# Patient Record
Sex: FEMALE | Race: BLACK OR AFRICAN AMERICAN | Employment: OTHER | URBAN - METROPOLITAN AREA
[De-identification: names, ages, dates, MRNs, and addresses within clinical notes are randomized per-mention and may not be internally consistent; named-entity substitution may affect disease eponyms.]

---

## 2017-01-01 ENCOUNTER — HOSPITAL ENCOUNTER (OUTPATIENT)
Dept: LAB | Age: 82
Discharge: HOME | End: 2017-08-22
Payer: MEDICARE

## 2017-01-01 ENCOUNTER — CLINICAL SUPPORT (OUTPATIENT)
Dept: CARDIOLOGY CLINIC | Age: 82
End: 2017-01-01

## 2017-01-01 ENCOUNTER — HOSPITAL ENCOUNTER (OUTPATIENT)
Dept: LAB | Age: 82
Discharge: HOME | End: 2017-07-31
Payer: MEDICARE

## 2017-01-01 ENCOUNTER — HOSPITAL ENCOUNTER (OUTPATIENT)
Dept: LAB | Age: 82
Discharge: HOME | End: 2017-11-09
Payer: MEDICARE

## 2017-01-01 ENCOUNTER — HOSPITAL ENCOUNTER (INPATIENT)
Age: 82
LOS: 1 days | Discharge: HOME | DRG: 189 | End: 2017-07-23
Attending: EMERGENCY MEDICINE | Admitting: FAMILY MEDICINE
Payer: MEDICARE

## 2017-01-01 ENCOUNTER — APPOINTMENT (OUTPATIENT)
Dept: GENERAL RADIOLOGY | Age: 82
DRG: 189 | End: 2017-01-01
Attending: EMERGENCY MEDICINE
Payer: MEDICARE

## 2017-01-01 ENCOUNTER — TELEPHONE (OUTPATIENT)
Dept: CARDIOLOGY CLINIC | Age: 82
End: 2017-01-01

## 2017-01-01 ENCOUNTER — OFFICE VISIT (OUTPATIENT)
Dept: CARDIOLOGY CLINIC | Age: 82
End: 2017-01-01

## 2017-01-01 VITALS
BODY MASS INDEX: 21.44 KG/M2 | WEIGHT: 121 LBS | OXYGEN SATURATION: 98 % | DIASTOLIC BLOOD PRESSURE: 60 MMHG | HEART RATE: 62 BPM | HEIGHT: 63 IN | SYSTOLIC BLOOD PRESSURE: 140 MMHG

## 2017-01-01 VITALS
BODY MASS INDEX: 21.43 KG/M2 | HEART RATE: 65 BPM | DIASTOLIC BLOOD PRESSURE: 58 MMHG | OXYGEN SATURATION: 98 % | WEIGHT: 121 LBS | SYSTOLIC BLOOD PRESSURE: 128 MMHG

## 2017-01-01 VITALS
HEART RATE: 64 BPM | DIASTOLIC BLOOD PRESSURE: 50 MMHG | WEIGHT: 123 LBS | OXYGEN SATURATION: 99 % | SYSTOLIC BLOOD PRESSURE: 130 MMHG | BODY MASS INDEX: 21.79 KG/M2

## 2017-01-01 VITALS
DIASTOLIC BLOOD PRESSURE: 51 MMHG | SYSTOLIC BLOOD PRESSURE: 125 MMHG | HEIGHT: 63 IN | TEMPERATURE: 98.1 F | BODY MASS INDEX: 22.34 KG/M2 | RESPIRATION RATE: 17 BRPM | WEIGHT: 126.1 LBS | HEART RATE: 58 BPM | OXYGEN SATURATION: 100 %

## 2017-01-01 VITALS
HEIGHT: 63 IN | OXYGEN SATURATION: 99 % | WEIGHT: 123 LBS | SYSTOLIC BLOOD PRESSURE: 142 MMHG | HEART RATE: 61 BPM | BODY MASS INDEX: 21.79 KG/M2 | DIASTOLIC BLOOD PRESSURE: 60 MMHG

## 2017-01-01 DIAGNOSIS — N18.30 CHRONIC KIDNEY DISEASE, STAGE III (MODERATE) (HCC): ICD-10-CM

## 2017-01-01 DIAGNOSIS — I10 ESSENTIAL HYPERTENSION: Primary | ICD-10-CM

## 2017-01-01 DIAGNOSIS — I50.23 SYSTOLIC CHF, ACUTE ON CHRONIC (HCC): Primary | ICD-10-CM

## 2017-01-01 DIAGNOSIS — I27.20 PULMONARY HYPERTENSION (HCC): ICD-10-CM

## 2017-01-01 DIAGNOSIS — I10 ESSENTIAL HYPERTENSION: ICD-10-CM

## 2017-01-01 DIAGNOSIS — D50.9 IRON DEFICIENCY ANEMIA: ICD-10-CM

## 2017-01-01 DIAGNOSIS — I20.8 ANGINA OF EFFORT (HCC): Primary | ICD-10-CM

## 2017-01-01 DIAGNOSIS — Z98.890 S/P MITRAL VALVE REPAIR: ICD-10-CM

## 2017-01-01 DIAGNOSIS — R09.02 HYPOXIA: ICD-10-CM

## 2017-01-01 DIAGNOSIS — N18.9 ANEMIA OF CHRONIC RENAL FAILURE: ICD-10-CM

## 2017-01-01 DIAGNOSIS — E78.5 HYPERLIPEMIA: ICD-10-CM

## 2017-01-01 DIAGNOSIS — D50.9 IRON (FE) DEFICIENCY ANEMIA: ICD-10-CM

## 2017-01-01 DIAGNOSIS — I20.8 ANGINA OF EFFORT (HCC): ICD-10-CM

## 2017-01-01 DIAGNOSIS — Z95.810 BIVENTRICULAR IMPLANTABLE CARDIOVERTER-DEFIBRILLATOR IN SITU: ICD-10-CM

## 2017-01-01 DIAGNOSIS — E87.3 METABOLIC ALKALOSIS: ICD-10-CM

## 2017-01-01 DIAGNOSIS — I50.42 CHRONIC COMBINED SYSTOLIC AND DIASTOLIC CONGESTIVE HEART FAILURE (HCC): ICD-10-CM

## 2017-01-01 DIAGNOSIS — D63.1 ANEMIA OF CHRONIC RENAL FAILURE: ICD-10-CM

## 2017-01-01 DIAGNOSIS — I50.23 SYSTOLIC CHF, ACUTE ON CHRONIC (HCC): ICD-10-CM

## 2017-01-01 DIAGNOSIS — I12.9 BENIGN HYPERTENSIVE KIDNEY DISEASE WITH CHRONIC KIDNEY DISEASE STAGE I THROUGH STAGE IV, OR UNSPECIFIED(403.10): ICD-10-CM

## 2017-01-01 DIAGNOSIS — I25.5 ISCHEMIC CARDIOMYOPATHY: ICD-10-CM

## 2017-01-01 DIAGNOSIS — E21.1 HYPERPARATHYROIDISM DUE TO 1,25(0H)2D3 (HCC): ICD-10-CM

## 2017-01-01 DIAGNOSIS — I12.9 MALIGNANT HYPERTENSIVE KIDNEY DISEASE WITH CHRONIC KIDNEY DISEASE STAGE I THROUGH STAGE IV, OR UNSPECIFIED(403.00): ICD-10-CM

## 2017-01-01 DIAGNOSIS — R07.9 CHEST PAIN, UNSPECIFIED TYPE: Primary | ICD-10-CM

## 2017-01-01 LAB
ALBUMIN SERPL BCP-MCNC: 3.3 G/DL (ref 3.4–5)
ALBUMIN SERPL-MCNC: 3.3 G/DL (ref 3.4–5)
ALBUMIN/GLOB SERPL: 1.1 {RATIO} (ref 0.8–1.7)
ALP SERPL-CCNC: 24 U/L (ref 45–117)
ALT SERPL-CCNC: 29 U/L (ref 13–56)
ANION GAP BLD CALC-SCNC: 6 MMOL/L (ref 3–18)
ANION GAP BLD CALC-SCNC: 6 MMOL/L (ref 3–18)
ANION GAP BLD CALC-SCNC: 7 MMOL/L (ref 3–18)
ANION GAP SERPL CALC-SCNC: 6 MMOL/L (ref 3–18)
ANION GAP SERPL CALC-SCNC: 7 MMOL/L (ref 3–18)
APPEARANCE UR: CLEAR
AST SERPL W P-5'-P-CCNC: 26 U/L (ref 15–37)
ATRIAL RATE: 75 BPM
BACTERIA URNS QL MICRO: ABNORMAL /HPF
BASOPHILS # BLD AUTO: 0 K/UL (ref 0–0.06)
BASOPHILS # BLD: 1 % (ref 0–2)
BILIRUB SERPL-MCNC: 0.6 MG/DL (ref 0.2–1)
BILIRUB UR QL: NEGATIVE
BUN SERPL-MCNC: 27 MG/DL (ref 7–18)
BUN SERPL-MCNC: 29 MG/DL (ref 7–18)
BUN SERPL-MCNC: 30 MG/DL (ref 7–18)
BUN SERPL-MCNC: 31 MG/DL (ref 7–18)
BUN SERPL-MCNC: 40 MG/DL (ref 7–18)
BUN/CREAT SERPL: 17 (ref 12–20)
BUN/CREAT SERPL: 18 (ref 12–20)
BUN/CREAT SERPL: 18 (ref 12–20)
BUN/CREAT SERPL: 20 (ref 12–20)
BUN/CREAT SERPL: 22 (ref 12–20)
CALCIUM SERPL-MCNC: 10.1 MG/DL (ref 8.5–10.1)
CALCIUM SERPL-MCNC: 9.1 MG/DL (ref 8.5–10.1)
CALCIUM SERPL-MCNC: 9.4 MG/DL (ref 8.5–10.1)
CALCIUM SERPL-MCNC: 9.5 MG/DL (ref 8.5–10.1)
CALCIUM SERPL-MCNC: 9.6 MG/DL (ref 8.5–10.1)
CALCIUM SERPL-MCNC: 9.6 MG/DL (ref 8.5–10.1)
CALCULATED P AXIS, ECG09: 85 DEGREES
CALCULATED R AXIS, ECG10: 114 DEGREES
CALCULATED T AXIS, ECG11: 127 DEGREES
CHLORIDE SERPL-SCNC: 100 MMOL/L (ref 100–108)
CHLORIDE SERPL-SCNC: 100 MMOL/L (ref 100–108)
CHLORIDE SERPL-SCNC: 101 MMOL/L (ref 100–108)
CHLORIDE SERPL-SCNC: 104 MMOL/L (ref 100–108)
CHLORIDE SERPL-SCNC: 105 MMOL/L (ref 100–108)
CK MB CFR SERPL CALC: 1.7 % (ref 0–4)
CK MB CFR SERPL CALC: 2.3 % (ref 0–4)
CK MB SERPL-MCNC: 1.4 NG/ML (ref 5–25)
CK MB SERPL-MCNC: 1.8 NG/ML (ref 5–25)
CK SERPL-CCNC: 80 U/L (ref 26–192)
CK SERPL-CCNC: 81 U/L (ref 26–192)
CO2 SERPL-SCNC: 29 MMOL/L (ref 21–32)
CO2 SERPL-SCNC: 31 MMOL/L (ref 21–32)
CO2 SERPL-SCNC: 33 MMOL/L (ref 21–32)
CO2 SERPL-SCNC: 34 MMOL/L (ref 21–32)
CO2 SERPL-SCNC: 36 MMOL/L (ref 21–32)
COLOR UR: YELLOW
CREAT SERPL-MCNC: 1.47 MG/DL (ref 0.6–1.3)
CREAT SERPL-MCNC: 1.49 MG/DL (ref 0.6–1.3)
CREAT SERPL-MCNC: 1.72 MG/DL (ref 0.6–1.3)
CREAT SERPL-MCNC: 1.74 MG/DL (ref 0.6–1.3)
CREAT SERPL-MCNC: 1.85 MG/DL (ref 0.6–1.3)
CREAT UR-MCNC: 146 MG/DL (ref 30–125)
DIAGNOSIS, 93000: NORMAL
DIFFERENTIAL METHOD BLD: ABNORMAL
EOSINOPHIL # BLD: 0.1 K/UL (ref 0–0.4)
EOSINOPHIL NFR BLD: 2 % (ref 0–5)
EPITH CASTS URNS QL MICRO: ABNORMAL /LPF (ref 0–5)
ERYTHROCYTE [DISTWIDTH] IN BLOOD BY AUTOMATED COUNT: 13.1 % (ref 11.6–14.5)
ERYTHROCYTE [DISTWIDTH] IN BLOOD BY AUTOMATED COUNT: 13.2 % (ref 11.6–14.5)
GLOBULIN SER CALC-MCNC: 3.1 G/DL (ref 2–4)
GLUCOSE SERPL-MCNC: 113 MG/DL (ref 74–99)
GLUCOSE SERPL-MCNC: 146 MG/DL (ref 74–99)
GLUCOSE SERPL-MCNC: 158 MG/DL (ref 74–99)
GLUCOSE SERPL-MCNC: 91 MG/DL (ref 74–99)
GLUCOSE SERPL-MCNC: 98 MG/DL (ref 74–99)
GLUCOSE UR STRIP.AUTO-MCNC: NEGATIVE MG/DL
HCT VFR BLD AUTO: 27.9 % (ref 35–45)
HCT VFR BLD AUTO: 29.3 % (ref 35–45)
HCT VFR BLD AUTO: 31.1 % (ref 35–45)
HGB BLD-MCNC: 8.9 G/DL (ref 12–16)
HGB BLD-MCNC: 9.6 G/DL (ref 12–16)
HGB BLD-MCNC: 9.9 G/DL (ref 12–16)
HGB UR QL STRIP: NEGATIVE
KETONES UR QL STRIP.AUTO: NEGATIVE MG/DL
LEUKOCYTE ESTERASE UR QL STRIP.AUTO: NEGATIVE
LYMPHOCYTES # BLD AUTO: 24 % (ref 21–52)
LYMPHOCYTES # BLD: 1 K/UL (ref 0.9–3.6)
MCH RBC QN AUTO: 30.7 PG (ref 24–34)
MCH RBC QN AUTO: 30.7 PG (ref 24–34)
MCHC RBC AUTO-ENTMCNC: 31.8 G/DL (ref 31–37)
MCHC RBC AUTO-ENTMCNC: 32.8 G/DL (ref 31–37)
MCV RBC AUTO: 93.6 FL (ref 74–97)
MCV RBC AUTO: 96.3 FL (ref 74–97)
MICROALBUMIN UR-MCNC: 17.2 MG/DL (ref 0–3)
MICROALBUMIN/CREAT UR-RTO: 118 MG/G (ref 0–30)
MONOCYTES # BLD: 0.6 K/UL (ref 0.05–1.2)
MONOCYTES NFR BLD AUTO: 13 % (ref 3–10)
NEUTS SEG # BLD: 2.5 K/UL (ref 1.8–8)
NEUTS SEG NFR BLD AUTO: 60 % (ref 40–73)
NITRITE UR QL STRIP.AUTO: NEGATIVE
P-R INTERVAL, ECG05: 180 MS
PH UR STRIP: 8.5 [PH] (ref 5–8)
PHOSPHATE SERPL-MCNC: 3.8 MG/DL (ref 2.5–4.9)
PLATELET # BLD AUTO: 118 K/UL (ref 135–420)
PLATELET # BLD AUTO: 144 K/UL (ref 135–420)
PMV BLD AUTO: 11.1 FL (ref 9.2–11.8)
PMV BLD AUTO: 9.9 FL (ref 9.2–11.8)
POTASSIUM SERPL-SCNC: 3.7 MMOL/L (ref 3.5–5.5)
POTASSIUM SERPL-SCNC: 3.7 MMOL/L (ref 3.5–5.5)
POTASSIUM SERPL-SCNC: 4.1 MMOL/L (ref 3.5–5.5)
POTASSIUM SERPL-SCNC: 4.7 MMOL/L (ref 3.5–5.5)
POTASSIUM SERPL-SCNC: 4.7 MMOL/L (ref 3.5–5.5)
PROT SERPL-MCNC: 6.4 G/DL (ref 6.4–8.2)
PROT UR STRIP-MCNC: 30 MG/DL
PTH-INTACT SERPL-MCNC: 23.4 PG/ML (ref 14–72)
Q-T INTERVAL, ECG07: 424 MS
QRS DURATION, ECG06: 118 MS
QTC CALCULATION (BEZET), ECG08: 473 MS
RBC # BLD AUTO: 3.13 M/UL (ref 4.2–5.3)
RBC # BLD AUTO: 3.23 M/UL (ref 4.2–5.3)
RBC #/AREA URNS HPF: ABNORMAL /HPF (ref 0–5)
SODIUM SERPL-SCNC: 140 MMOL/L (ref 136–145)
SODIUM SERPL-SCNC: 141 MMOL/L (ref 136–145)
SODIUM SERPL-SCNC: 142 MMOL/L (ref 136–145)
SP GR UR REFRACTOMETRY: 1.02 (ref 1–1.03)
TROPONIN I SERPL-MCNC: 0.03 NG/ML (ref 0–0.04)
TROPONIN I SERPL-MCNC: 0.03 NG/ML (ref 0–0.04)
UROBILINOGEN UR QL STRIP.AUTO: 0.2 EU/DL (ref 0.2–1)
VENTRICULAR RATE, ECG03: 75 BPM
WBC # BLD AUTO: 4.1 K/UL (ref 4.6–13.2)
WBC # BLD AUTO: 4.1 K/UL (ref 4.6–13.2)
WBC URNS QL MICRO: ABNORMAL /HPF (ref 0–4)

## 2017-01-01 PROCEDURE — 36415 COLL VENOUS BLD VENIPUNCTURE: CPT | Performed by: INTERNAL MEDICINE

## 2017-01-01 PROCEDURE — 83970 ASSAY OF PARATHORMONE: CPT | Performed by: INTERNAL MEDICINE

## 2017-01-01 PROCEDURE — 96374 THER/PROPH/DIAG INJ IV PUSH: CPT

## 2017-01-01 PROCEDURE — 85025 COMPLETE CBC W/AUTO DIFF WBC: CPT | Performed by: EMERGENCY MEDICINE

## 2017-01-01 PROCEDURE — 82550 ASSAY OF CK (CPK): CPT | Performed by: FAMILY MEDICINE

## 2017-01-01 PROCEDURE — 65660000000 HC RM CCU STEPDOWN

## 2017-01-01 PROCEDURE — 81001 URINALYSIS AUTO W/SCOPE: CPT | Performed by: EMERGENCY MEDICINE

## 2017-01-01 PROCEDURE — 85018 HEMOGLOBIN: CPT | Performed by: INTERNAL MEDICINE

## 2017-01-01 PROCEDURE — 80048 BASIC METABOLIC PNL TOTAL CA: CPT | Performed by: INTERNAL MEDICINE

## 2017-01-01 PROCEDURE — 74011250637 HC RX REV CODE- 250/637: Performed by: STUDENT IN AN ORGANIZED HEALTH CARE EDUCATION/TRAINING PROGRAM

## 2017-01-01 PROCEDURE — 77010033678 HC OXYGEN DAILY

## 2017-01-01 PROCEDURE — 82043 UR ALBUMIN QUANTITATIVE: CPT | Performed by: INTERNAL MEDICINE

## 2017-01-01 PROCEDURE — 96375 TX/PRO/DX INJ NEW DRUG ADDON: CPT

## 2017-01-01 PROCEDURE — 74011250636 HC RX REV CODE- 250/636: Performed by: EMERGENCY MEDICINE

## 2017-01-01 PROCEDURE — 85027 COMPLETE CBC AUTOMATED: CPT

## 2017-01-01 PROCEDURE — 74011250637 HC RX REV CODE- 250/637: Performed by: FAMILY MEDICINE

## 2017-01-01 PROCEDURE — 77030011943

## 2017-01-01 PROCEDURE — 80048 BASIC METABOLIC PNL TOTAL CA: CPT

## 2017-01-01 PROCEDURE — 93306 TTE W/DOPPLER COMPLETE: CPT

## 2017-01-01 PROCEDURE — 36415 COLL VENOUS BLD VENIPUNCTURE: CPT

## 2017-01-01 PROCEDURE — 82550 ASSAY OF CK (CPK): CPT | Performed by: EMERGENCY MEDICINE

## 2017-01-01 PROCEDURE — 74011250637 HC RX REV CODE- 250/637: Performed by: INTERNAL MEDICINE

## 2017-01-01 PROCEDURE — 93005 ELECTROCARDIOGRAM TRACING: CPT

## 2017-01-01 PROCEDURE — 80053 COMPREHEN METABOLIC PANEL: CPT | Performed by: EMERGENCY MEDICINE

## 2017-01-01 PROCEDURE — 74011250636 HC RX REV CODE- 250/636: Performed by: STUDENT IN AN ORGANIZED HEALTH CARE EDUCATION/TRAINING PROGRAM

## 2017-01-01 PROCEDURE — 99285 EMERGENCY DEPT VISIT HI MDM: CPT

## 2017-01-01 PROCEDURE — 71010 XR CHEST PORT: CPT

## 2017-01-01 PROCEDURE — 93970 EXTREMITY STUDY: CPT

## 2017-01-01 PROCEDURE — 80069 RENAL FUNCTION PANEL: CPT | Performed by: INTERNAL MEDICINE

## 2017-01-01 RX ORDER — LANOLIN ALCOHOL/MO/W.PET/CERES
325 CREAM (GRAM) TOPICAL
Status: DISCONTINUED | OUTPATIENT
Start: 2017-01-01 | End: 2017-01-01 | Stop reason: HOSPADM

## 2017-01-01 RX ORDER — LEVOTHYROXINE SODIUM 50 UG/1
50 TABLET ORAL
Status: DISCONTINUED | OUTPATIENT
Start: 2017-01-01 | End: 2017-01-01 | Stop reason: HOSPADM

## 2017-01-01 RX ORDER — CALCIUM CARBONATE/VITAMIN D3 600MG-5MCG
1 TABLET ORAL DAILY
Status: DISCONTINUED | OUTPATIENT
Start: 2017-01-01 | End: 2017-01-01 | Stop reason: HOSPADM

## 2017-01-01 RX ORDER — GUAIFENESIN 100 MG/5ML
81 LIQUID (ML) ORAL DAILY
Status: DISCONTINUED | OUTPATIENT
Start: 2017-01-01 | End: 2017-01-01 | Stop reason: HOSPADM

## 2017-01-01 RX ORDER — POTASSIUM CHLORIDE 20 MEQ/1
20 TABLET, EXTENDED RELEASE ORAL 2 TIMES DAILY
Status: DISCONTINUED | OUTPATIENT
Start: 2017-01-01 | End: 2017-01-01 | Stop reason: HOSPADM

## 2017-01-01 RX ORDER — MORPHINE SULFATE 2 MG/ML
2 INJECTION, SOLUTION INTRAMUSCULAR; INTRAVENOUS
Status: COMPLETED | OUTPATIENT
Start: 2017-01-01 | End: 2017-01-01

## 2017-01-01 RX ORDER — HYDRALAZINE HYDROCHLORIDE 50 MG/1
50 TABLET, FILM COATED ORAL 3 TIMES DAILY
Status: DISCONTINUED | OUTPATIENT
Start: 2017-01-01 | End: 2017-01-01

## 2017-01-01 RX ORDER — PANTOPRAZOLE SODIUM 40 MG/1
40 TABLET, DELAYED RELEASE ORAL DAILY
Status: DISCONTINUED | OUTPATIENT
Start: 2017-01-01 | End: 2017-01-01 | Stop reason: HOSPADM

## 2017-01-01 RX ORDER — ATORVASTATIN CALCIUM 20 MG/1
20 TABLET, FILM COATED ORAL
Status: DISCONTINUED | OUTPATIENT
Start: 2017-01-01 | End: 2017-01-01 | Stop reason: HOSPADM

## 2017-01-01 RX ORDER — ISOSORBIDE MONONITRATE 120 MG/1
TABLET, EXTENDED RELEASE ORAL
Qty: 30 TAB | Refills: 11 | Status: SHIPPED | OUTPATIENT
Start: 2017-01-01

## 2017-01-01 RX ORDER — NITROGLYCERIN 0.4 MG/1
0.4 TABLET SUBLINGUAL
Status: DISCONTINUED | OUTPATIENT
Start: 2017-01-01 | End: 2017-01-01 | Stop reason: HOSPADM

## 2017-01-01 RX ORDER — FLUTICASONE PROPIONATE 50 MCG
2 SPRAY, SUSPENSION (ML) NASAL
Status: DISCONTINUED | OUTPATIENT
Start: 2017-01-01 | End: 2017-01-01 | Stop reason: HOSPADM

## 2017-01-01 RX ORDER — UREA 10 %
2 LOTION (ML) TOPICAL 2 TIMES DAILY
Status: DISCONTINUED | OUTPATIENT
Start: 2017-01-01 | End: 2017-01-01 | Stop reason: HOSPADM

## 2017-01-01 RX ORDER — ONDANSETRON 2 MG/ML
4 INJECTION INTRAMUSCULAR; INTRAVENOUS
Status: COMPLETED | OUTPATIENT
Start: 2017-01-01 | End: 2017-01-01

## 2017-01-01 RX ORDER — CARVEDILOL 25 MG/1
25 TABLET ORAL 2 TIMES DAILY WITH MEALS
Status: DISCONTINUED | OUTPATIENT
Start: 2017-01-01 | End: 2017-01-01 | Stop reason: HOSPADM

## 2017-01-01 RX ORDER — ISOSORBIDE MONONITRATE 60 MG/1
120 TABLET, EXTENDED RELEASE ORAL DAILY
Status: DISCONTINUED | OUTPATIENT
Start: 2017-01-01 | End: 2017-01-01 | Stop reason: HOSPADM

## 2017-01-01 RX ORDER — HEPARIN SODIUM 5000 [USP'U]/ML
5000 INJECTION, SOLUTION INTRAVENOUS; SUBCUTANEOUS EVERY 8 HOURS
Status: DISCONTINUED | OUTPATIENT
Start: 2017-01-01 | End: 2017-01-01 | Stop reason: HOSPADM

## 2017-01-01 RX ORDER — MORPHINE SULFATE 2 MG/ML
0.5 INJECTION, SOLUTION INTRAMUSCULAR; INTRAVENOUS ONCE
Status: COMPLETED | OUTPATIENT
Start: 2017-01-01 | End: 2017-01-01

## 2017-01-01 RX ORDER — LIDOCAINE HCL 4 G/100G
CREAM TOPICAL
Qty: 1 TUBE | Refills: 1 | Status: SHIPPED | OUTPATIENT
Start: 2017-01-01 | End: 2018-01-01 | Stop reason: CLARIF

## 2017-01-01 RX ORDER — HYDRALAZINE HYDROCHLORIDE 25 MG/1
75 TABLET, FILM COATED ORAL 3 TIMES DAILY
Qty: 270 TAB | Refills: 1 | Status: SHIPPED | OUTPATIENT
Start: 2017-01-01 | End: 2017-01-01

## 2017-01-01 RX ORDER — BUMETANIDE 1 MG/1
1 TABLET ORAL DAILY
Status: DISCONTINUED | OUTPATIENT
Start: 2017-01-01 | End: 2017-01-01 | Stop reason: HOSPADM

## 2017-01-01 RX ADMIN — HYDRALAZINE HYDROCHLORIDE 50 MG: 50 TABLET, FILM COATED ORAL at 08:55

## 2017-01-01 RX ADMIN — FERROUS SULFATE TAB 325 MG (65 MG ELEMENTAL FE) 325 MG: 325 (65 FE) TAB at 08:55

## 2017-01-01 RX ADMIN — Medication 0.5 MG: at 19:55

## 2017-01-01 RX ADMIN — PANTOPRAZOLE SODIUM 40 MG: 40 TABLET, DELAYED RELEASE ORAL at 08:54

## 2017-01-01 RX ADMIN — ONDANSETRON 4 MG: 2 INJECTION INTRAMUSCULAR; INTRAVENOUS at 11:29

## 2017-01-01 RX ADMIN — HEPARIN SODIUM 5000 UNITS: 5000 INJECTION, SOLUTION INTRAVENOUS; SUBCUTANEOUS at 03:31

## 2017-01-01 RX ADMIN — CARVEDILOL 25 MG: 25 TABLET, FILM COATED ORAL at 08:55

## 2017-01-01 RX ADMIN — HYDRALAZINE HYDROCHLORIDE 75 MG: 50 TABLET, FILM COATED ORAL at 16:21

## 2017-01-01 RX ADMIN — HEPARIN SODIUM 5000 UNITS: 5000 INJECTION, SOLUTION INTRAVENOUS; SUBCUTANEOUS at 11:40

## 2017-01-01 RX ADMIN — FLUTICASONE PROPIONATE 2 SPRAY: 50 SPRAY, METERED NASAL at 21:54

## 2017-01-01 RX ADMIN — LACTOBACILLUS TAB 2 TABLET: TAB at 16:21

## 2017-01-01 RX ADMIN — POTASSIUM CHLORIDE 20 MEQ: 1500 TABLET, EXTENDED RELEASE ORAL at 19:55

## 2017-01-01 RX ADMIN — CARVEDILOL 25 MG: 25 TABLET, FILM COATED ORAL at 16:22

## 2017-01-01 RX ADMIN — ATORVASTATIN CALCIUM 20 MG: 20 TABLET, FILM COATED ORAL at 21:54

## 2017-01-01 RX ADMIN — PHENOBARBITAL 30 ML: 16.2; .1037; .0065; .0194 ELIXIR ORAL at 11:38

## 2017-01-01 RX ADMIN — CARVEDILOL 25 MG: 25 TABLET, FILM COATED ORAL at 19:55

## 2017-01-01 RX ADMIN — Medication 2 MG: at 11:31

## 2017-01-01 RX ADMIN — LACTOBACILLUS TAB 2 TABLET: TAB at 08:54

## 2017-01-01 RX ADMIN — Medication 1 TABLET: at 08:54

## 2017-01-01 RX ADMIN — HEPARIN SODIUM 5000 UNITS: 5000 INJECTION, SOLUTION INTRAVENOUS; SUBCUTANEOUS at 19:55

## 2017-01-01 RX ADMIN — ISOSORBIDE MONONITRATE 120 MG: 60 TABLET, EXTENDED RELEASE ORAL at 08:54

## 2017-01-01 RX ADMIN — HYDRALAZINE HYDROCHLORIDE 50 MG: 50 TABLET, FILM COATED ORAL at 21:54

## 2017-01-01 RX ADMIN — LACTOBACILLUS TAB 2 TABLET: TAB at 19:55

## 2017-01-01 RX ADMIN — ASPIRIN 81 MG 81 MG: 81 TABLET ORAL at 08:55

## 2017-01-01 RX ADMIN — LEVOTHYROXINE SODIUM 50 MCG: 50 TABLET ORAL at 08:54

## 2017-01-01 RX ADMIN — BUMETANIDE 1 MG: 1 TABLET ORAL at 08:54

## 2017-01-13 ENCOUNTER — APPOINTMENT (OUTPATIENT)
Dept: GENERAL RADIOLOGY | Age: 82
DRG: 291 | End: 2017-01-13
Attending: EMERGENCY MEDICINE
Payer: MEDICARE

## 2017-01-13 ENCOUNTER — HOSPITAL ENCOUNTER (INPATIENT)
Age: 82
LOS: 4 days | Discharge: NURSING, REHAB OR LONG-TERM CARE | DRG: 291 | End: 2017-01-17
Attending: EMERGENCY MEDICINE | Admitting: FAMILY MEDICINE
Payer: MEDICARE

## 2017-01-13 ENCOUNTER — APPOINTMENT (OUTPATIENT)
Dept: CT IMAGING | Age: 82
DRG: 291 | End: 2017-01-13
Attending: EMERGENCY MEDICINE
Payer: MEDICARE

## 2017-01-13 DIAGNOSIS — E86.0 DEHYDRATION: ICD-10-CM

## 2017-01-13 DIAGNOSIS — R07.9 CHEST PAIN, UNSPECIFIED TYPE: ICD-10-CM

## 2017-01-13 DIAGNOSIS — J18.9 CAP (COMMUNITY ACQUIRED PNEUMONIA): Primary | ICD-10-CM

## 2017-01-13 DIAGNOSIS — R06.09 DYSPNEA ON EXERTION: ICD-10-CM

## 2017-01-13 PROBLEM — R06.03 RESPIRATORY DISTRESS: Status: ACTIVE | Noted: 2017-01-13

## 2017-01-13 LAB
ALBUMIN SERPL BCP-MCNC: 3.7 G/DL (ref 3.4–5)
ALBUMIN/GLOB SERPL: 1.1 {RATIO} (ref 0.8–1.7)
ALP SERPL-CCNC: 21 U/L (ref 45–117)
ALT SERPL-CCNC: 37 U/L (ref 13–56)
ANION GAP BLD CALC-SCNC: 9 MMOL/L (ref 3–18)
APPEARANCE UR: CLEAR
AST SERPL W P-5'-P-CCNC: 36 U/L (ref 15–37)
BACTERIA URNS QL MICRO: ABNORMAL /HPF
BASOPHILS # BLD AUTO: 0 K/UL (ref 0–0.1)
BASOPHILS # BLD: 1 % (ref 0–2)
BILIRUB SERPL-MCNC: 0.7 MG/DL (ref 0.2–1)
BILIRUB UR QL: NEGATIVE
BNP SERPL-MCNC: ABNORMAL PG/ML (ref 0–1800)
BUN SERPL-MCNC: 21 MG/DL (ref 7–18)
BUN/CREAT SERPL: 15 (ref 12–20)
CALCIUM SERPL-MCNC: 10 MG/DL (ref 8.5–10.1)
CHLORIDE SERPL-SCNC: 104 MMOL/L (ref 100–108)
CK MB CFR SERPL CALC: 2.3 % (ref 0–4)
CK MB SERPL-MCNC: 1.9 NG/ML (ref 0.5–3.6)
CK SERPL-CCNC: 83 U/L (ref 26–192)
CO2 SERPL-SCNC: 28 MMOL/L (ref 21–32)
COLOR UR: YELLOW
CREAT SERPL-MCNC: 1.39 MG/DL (ref 0.6–1.3)
DIFFERENTIAL METHOD BLD: ABNORMAL
EOSINOPHIL # BLD: 0.1 K/UL (ref 0–0.4)
EOSINOPHIL NFR BLD: 2 % (ref 0–5)
EPITH CASTS URNS QL MICRO: NEGATIVE /LPF (ref 0–5)
ERYTHROCYTE [DISTWIDTH] IN BLOOD BY AUTOMATED COUNT: 12.5 % (ref 11.6–14.5)
FERRITIN SERPL-MCNC: 529 NG/ML (ref 8–388)
FLUAV AG NPH QL IA: NEGATIVE
FLUBV AG NOSE QL IA: NEGATIVE
GLOBULIN SER CALC-MCNC: 3.4 G/DL (ref 2–4)
GLUCOSE SERPL-MCNC: 114 MG/DL (ref 74–99)
GLUCOSE UR STRIP.AUTO-MCNC: NEGATIVE MG/DL
HCT VFR BLD AUTO: 35.3 % (ref 35–45)
HGB BLD-MCNC: 11.6 G/DL (ref 12–16)
HGB UR QL STRIP: NEGATIVE
HYALINE CASTS URNS QL MICRO: ABNORMAL /LPF (ref 0–2)
IRON SATN MFR SERPL: 15 %
IRON SERPL-MCNC: 41 UG/DL (ref 50–175)
KETONES UR QL STRIP.AUTO: ABNORMAL MG/DL
LEUKOCYTE ESTERASE UR QL STRIP.AUTO: ABNORMAL
LYMPHOCYTES # BLD AUTO: 20 % (ref 21–52)
LYMPHOCYTES # BLD: 1 K/UL (ref 0.9–3.6)
MCH RBC QN AUTO: 30.8 PG (ref 24–34)
MCHC RBC AUTO-ENTMCNC: 32.9 G/DL (ref 31–37)
MCV RBC AUTO: 93.6 FL (ref 74–97)
MONOCYTES # BLD: 0.5 K/UL (ref 0.05–1.2)
MONOCYTES NFR BLD AUTO: 11 % (ref 3–10)
NEUTS SEG # BLD: 3.2 K/UL (ref 1.8–8)
NEUTS SEG NFR BLD AUTO: 66 % (ref 40–73)
NITRITE UR QL STRIP.AUTO: NEGATIVE
PH UR STRIP: 8 [PH] (ref 5–8)
PLATELET # BLD AUTO: 156 K/UL (ref 135–420)
PMV BLD AUTO: 9.7 FL (ref 9.2–11.8)
POTASSIUM SERPL-SCNC: 4.9 MMOL/L (ref 3.5–5.5)
PROT SERPL-MCNC: 7.1 G/DL (ref 6.4–8.2)
PROT UR STRIP-MCNC: 100 MG/DL
RBC # BLD AUTO: 3.77 M/UL (ref 4.2–5.3)
RBC #/AREA URNS HPF: NEGATIVE /HPF (ref 0–5)
SODIUM SERPL-SCNC: 141 MMOL/L (ref 136–145)
SP GR UR REFRACTOMETRY: 1.02 (ref 1–1.03)
TIBC SERPL-MCNC: 276 UG/DL (ref 250–450)
TROPONIN I SERPL-MCNC: 0.03 NG/ML (ref 0–0.04)
TROPONIN I SERPL-MCNC: 0.04 NG/ML (ref 0–0.04)
TSH SERPL DL<=0.05 MIU/L-ACNC: 2.53 UIU/ML (ref 0.36–3.74)
UROBILINOGEN UR QL STRIP.AUTO: 0.2 EU/DL (ref 0.2–1)
WBC # BLD AUTO: 4.8 K/UL (ref 4.6–13.2)
WBC URNS QL MICRO: ABNORMAL /HPF (ref 0–4)

## 2017-01-13 PROCEDURE — 74011250636 HC RX REV CODE- 250/636: Performed by: FAMILY MEDICINE

## 2017-01-13 PROCEDURE — 74011000250 HC RX REV CODE- 250: Performed by: FAMILY MEDICINE

## 2017-01-13 PROCEDURE — 94640 AIRWAY INHALATION TREATMENT: CPT

## 2017-01-13 PROCEDURE — 81001 URINALYSIS AUTO W/SCOPE: CPT | Performed by: EMERGENCY MEDICINE

## 2017-01-13 PROCEDURE — 87086 URINE CULTURE/COLONY COUNT: CPT | Performed by: EMERGENCY MEDICINE

## 2017-01-13 PROCEDURE — 96367 TX/PROPH/DG ADDL SEQ IV INF: CPT

## 2017-01-13 PROCEDURE — 74011250637 HC RX REV CODE- 250/637: Performed by: EMERGENCY MEDICINE

## 2017-01-13 PROCEDURE — 74011000258 HC RX REV CODE- 258: Performed by: EMERGENCY MEDICINE

## 2017-01-13 PROCEDURE — 87804 INFLUENZA ASSAY W/OPTIC: CPT | Performed by: EMERGENCY MEDICINE

## 2017-01-13 PROCEDURE — 83540 ASSAY OF IRON: CPT | Performed by: FAMILY MEDICINE

## 2017-01-13 PROCEDURE — 74011000258 HC RX REV CODE- 258: Performed by: FAMILY MEDICINE

## 2017-01-13 PROCEDURE — 77030013140 HC MSK NEB VYRM -A

## 2017-01-13 PROCEDURE — 74000 XR ABD (KUB): CPT

## 2017-01-13 PROCEDURE — 96365 THER/PROPH/DIAG IV INF INIT: CPT

## 2017-01-13 PROCEDURE — 93005 ELECTROCARDIOGRAM TRACING: CPT

## 2017-01-13 PROCEDURE — 74011000250 HC RX REV CODE- 250: Performed by: EMERGENCY MEDICINE

## 2017-01-13 PROCEDURE — 71020 XR CHEST PA LAT: CPT

## 2017-01-13 PROCEDURE — 84443 ASSAY THYROID STIM HORMONE: CPT | Performed by: EMERGENCY MEDICINE

## 2017-01-13 PROCEDURE — 85025 COMPLETE CBC W/AUTO DIFF WBC: CPT | Performed by: EMERGENCY MEDICINE

## 2017-01-13 PROCEDURE — 80053 COMPREHEN METABOLIC PANEL: CPT | Performed by: EMERGENCY MEDICINE

## 2017-01-13 PROCEDURE — 83880 ASSAY OF NATRIURETIC PEPTIDE: CPT | Performed by: EMERGENCY MEDICINE

## 2017-01-13 PROCEDURE — 65270000029 HC RM PRIVATE

## 2017-01-13 PROCEDURE — 99285 EMERGENCY DEPT VISIT HI MDM: CPT

## 2017-01-13 PROCEDURE — 87040 BLOOD CULTURE FOR BACTERIA: CPT | Performed by: EMERGENCY MEDICINE

## 2017-01-13 PROCEDURE — 82728 ASSAY OF FERRITIN: CPT | Performed by: FAMILY MEDICINE

## 2017-01-13 PROCEDURE — 82550 ASSAY OF CK (CPK): CPT | Performed by: EMERGENCY MEDICINE

## 2017-01-13 PROCEDURE — 74011250636 HC RX REV CODE- 250/636: Performed by: EMERGENCY MEDICINE

## 2017-01-13 PROCEDURE — 71250 CT THORAX DX C-: CPT

## 2017-01-13 PROCEDURE — 74011250637 HC RX REV CODE- 250/637: Performed by: FAMILY MEDICINE

## 2017-01-13 PROCEDURE — 96375 TX/PRO/DX INJ NEW DRUG ADDON: CPT

## 2017-01-13 RX ORDER — BENZONATATE 100 MG/1
200 CAPSULE ORAL
Status: COMPLETED | OUTPATIENT
Start: 2017-01-13 | End: 2017-01-13

## 2017-01-13 RX ORDER — MORPHINE SULFATE 2 MG/ML
2 INJECTION, SOLUTION INTRAMUSCULAR; INTRAVENOUS
Status: COMPLETED | OUTPATIENT
Start: 2017-01-13 | End: 2017-01-13

## 2017-01-13 RX ORDER — UREA 10 %
2 LOTION (ML) TOPICAL 2 TIMES DAILY
Status: DISCONTINUED | OUTPATIENT
Start: 2017-01-14 | End: 2017-01-17 | Stop reason: HOSPADM

## 2017-01-13 RX ORDER — FUROSEMIDE 10 MG/ML
40 INJECTION INTRAMUSCULAR; INTRAVENOUS
Status: COMPLETED | OUTPATIENT
Start: 2017-01-13 | End: 2017-01-13

## 2017-01-13 RX ORDER — LANOLIN ALCOHOL/MO/W.PET/CERES
1 CREAM (GRAM) TOPICAL 2 TIMES DAILY WITH MEALS
Status: DISCONTINUED | OUTPATIENT
Start: 2017-01-14 | End: 2017-01-17 | Stop reason: HOSPADM

## 2017-01-13 RX ORDER — GUAIFENESIN 100 MG/5ML
81 LIQUID (ML) ORAL DAILY
Status: DISCONTINUED | OUTPATIENT
Start: 2017-01-14 | End: 2017-01-17 | Stop reason: HOSPADM

## 2017-01-13 RX ORDER — CARVEDILOL 25 MG/1
25 TABLET ORAL 2 TIMES DAILY WITH MEALS
Status: DISCONTINUED | OUTPATIENT
Start: 2017-01-14 | End: 2017-01-13

## 2017-01-13 RX ORDER — SCOLOPAMINE TRANSDERMAL SYSTEM 1 MG/1
1.5 PATCH, EXTENDED RELEASE TRANSDERMAL
Status: DISCONTINUED | OUTPATIENT
Start: 2017-01-13 | End: 2017-01-17

## 2017-01-13 RX ORDER — PROMETHAZINE HYDROCHLORIDE 25 MG/1
25 SUPPOSITORY RECTAL
Status: COMPLETED | OUTPATIENT
Start: 2017-01-13 | End: 2017-01-13

## 2017-01-13 RX ORDER — ALBUTEROL SULFATE 0.83 MG/ML
2.5 SOLUTION RESPIRATORY (INHALATION)
Status: COMPLETED | OUTPATIENT
Start: 2017-01-13 | End: 2017-01-13

## 2017-01-13 RX ORDER — HEPARIN SODIUM 5000 [USP'U]/ML
5000 INJECTION, SOLUTION INTRAVENOUS; SUBCUTANEOUS EVERY 8 HOURS
Status: DISCONTINUED | OUTPATIENT
Start: 2017-01-13 | End: 2017-01-17 | Stop reason: HOSPADM

## 2017-01-13 RX ORDER — FUROSEMIDE 10 MG/ML
20 INJECTION INTRAMUSCULAR; INTRAVENOUS 2 TIMES DAILY
Status: DISCONTINUED | OUTPATIENT
Start: 2017-01-14 | End: 2017-01-13

## 2017-01-13 RX ORDER — CARVEDILOL 25 MG/1
25 TABLET ORAL 2 TIMES DAILY WITH MEALS
Status: DISCONTINUED | OUTPATIENT
Start: 2017-01-13 | End: 2017-01-17 | Stop reason: HOSPADM

## 2017-01-13 RX ORDER — FUROSEMIDE 10 MG/ML
20 INJECTION INTRAMUSCULAR; INTRAVENOUS 2 TIMES DAILY
Status: DISCONTINUED | OUTPATIENT
Start: 2017-01-13 | End: 2017-01-14

## 2017-01-13 RX ORDER — LEVOTHYROXINE SODIUM 50 UG/1
50 TABLET ORAL
Status: DISCONTINUED | OUTPATIENT
Start: 2017-01-14 | End: 2017-01-17 | Stop reason: HOSPADM

## 2017-01-13 RX ORDER — FAMOTIDINE 10 MG/ML
20 INJECTION INTRAVENOUS
Status: COMPLETED | OUTPATIENT
Start: 2017-01-13 | End: 2017-01-13

## 2017-01-13 RX ORDER — POTASSIUM CHLORIDE 20 MEQ/1
20 TABLET, EXTENDED RELEASE ORAL DAILY
Status: DISCONTINUED | OUTPATIENT
Start: 2017-01-14 | End: 2017-01-17 | Stop reason: HOSPADM

## 2017-01-13 RX ADMIN — DOXYCYCLINE 100 MG: 100 INJECTION, POWDER, LYOPHILIZED, FOR SOLUTION INTRAVENOUS at 23:33

## 2017-01-13 RX ADMIN — FAMOTIDINE 20 MG: 10 INJECTION, SOLUTION INTRAVENOUS at 14:58

## 2017-01-13 RX ADMIN — FUROSEMIDE 40 MG: 10 INJECTION, SOLUTION INTRAVENOUS at 16:01

## 2017-01-13 RX ADMIN — CARVEDILOL 25 MG: 25 TABLET, FILM COATED ORAL at 23:29

## 2017-01-13 RX ADMIN — FUROSEMIDE 20 MG: 10 INJECTION, SOLUTION INTRAVENOUS at 23:30

## 2017-01-13 RX ADMIN — SODIUM CHLORIDE 500 MG: 900 INJECTION, SOLUTION INTRAVENOUS at 14:57

## 2017-01-13 RX ADMIN — ALBUTEROL SULFATE 2.5 MG: 2.5 SOLUTION RESPIRATORY (INHALATION) at 15:20

## 2017-01-13 RX ADMIN — HEPARIN SODIUM 5000 UNITS: 5000 INJECTION, SOLUTION INTRAVENOUS; SUBCUTANEOUS at 21:17

## 2017-01-13 RX ADMIN — Medication 2 MG: at 15:18

## 2017-01-13 RX ADMIN — PROMETHAZINE HYDROCHLORIDE 25 MG: 25 SUPPOSITORY RECTAL at 15:09

## 2017-01-13 RX ADMIN — ALBUTEROL SULFATE 2.5 MG: 2.5 SOLUTION RESPIRATORY (INHALATION) at 13:08

## 2017-01-13 RX ADMIN — BENZONATATE 200 MG: 100 CAPSULE ORAL at 13:07

## 2017-01-13 RX ADMIN — CEFTRIAXONE 1 G: 1 INJECTION, POWDER, FOR SOLUTION INTRAMUSCULAR; INTRAVENOUS at 16:15

## 2017-01-13 NOTE — ED NOTES
Hourly rounding complete. Safety  Pt resting   [ * ]  On stretcher with side rails up and bed in locked position, call bell within reach  [  ]  Sitting in chair with casters locked, call bell within reach    Toileting  [  ] pt denies need to use bathroom  [  ] pt assisted to bathroom  [  ] pt assisted with bedpan  [  ] pt independent to bathroom as needed    Ongoing Plan of Care  Plan of care and expected time for test and results reviewed with pt.     Pain Management / Comfort  [  ] dimmed lights  [  ] warm blanket provided  [  ] pain assessed  [ * ] monitor alarms reviewed

## 2017-01-13 NOTE — ED NOTES
Hourly rounding complete. Safety  Pt resting   [*  ]  On stretcher with side rails up and bed in locked position, call bell within reach  [  ]  Sitting in chair with casters locked, call bell within reach    Toileting  [  ] pt denies need to use bathroom  [  ] pt assisted to bathroom  [  ] pt assisted with bedpan  [  ] pt independent to bathroom as needed    Ongoing Plan of Care  Plan of care and expected time for test and results reviewed with pt.     Pain Management / Comfort  [  ] dimmed lights  [*  ] warm blanket provided  [  ] pain assessed  [ * ] monitor alarms reviewed

## 2017-01-13 NOTE — IP AVS SNAPSHOT
Current Discharge Medication List  
  
Take these medications at their scheduled times Dose & Instructions Dispensing Information Comments Morning Noon Evening Bedtime  
 aspirin 81 mg tablet Your next dose is: Today, Tomorrow Other:  ____________ Dose:  81 mg Take 81 mg by mouth daily. Refills:  0  
     
   
   
   
  
 atorvastatin 20 mg tablet Commonly known as:  LIPITOR Your next dose is: Today, Tomorrow Other:  ____________ Dose:  20 mg Take 1 Tab by mouth nightly. Quantity:  30 Tab Refills:  0 BUMEX PO Your next dose is: Today, Tomorrow Other:  ____________ Dose:  1 mg Take 1 mg by mouth as directed. Take one tablet daily and as directed if weight up 2+ pounds Refills:  0  
     
   
   
   
  
 carvedilol 25 mg tablet Commonly known as:  Kevin Pintos Your next dose is: Today, Tomorrow Other:  ____________ Dose:  25 mg Take 1 Tab by mouth two (2) times daily (with meals). Quantity:  60 Tab Refills:  6 CITRACAL PLUS D 315-200 mg-unit Tab Generic drug:  calcium citrate-vitamin d3 Your next dose is: Today, Tomorrow Other:  ____________ Dose:  1 Tab Take 1 tablet by mouth daily (with breakfast). Refills:  0 CRANBERRY-PROBIOTICS-VITAMIN C PO Your next dose is: Today, Tomorrow Other:  ____________ Dose:  1500 mg Take 1,500 mg by mouth daily. Refills:  0  
     
   
   
   
  
 doxycycline 100 mg capsule Commonly known as:  Pedro Denver Your next dose is: Today, Tomorrow Other:  ____________ Dose:  100 mg Take 1 Cap by mouth two (2) times a day. Quantity:  10 Cap Refills:  0  
     
   
   
   
  
 flaxseed oil 1,000 mg Cap Your next dose is: Today, Tomorrow Other:  ____________ Take  by mouth two (2) times a day. Refills:  0  
     
   
   
   
  
 FLONASE 50 mcg/actuation nasal spray Generic drug:  fluticasone Your next dose is: Today, Tomorrow Other:  ____________  
   
   
 nightly. Refills:  0  
     
   
   
   
  
 hydrALAZINE 25 mg tablet Commonly known as:  APRESOLINE Your next dose is: Today, Tomorrow Other:  ____________ Dose:  25 mg Take 1 Tab by mouth three (3) times daily. Quantity:  90 Tab Refills:  0 Iron 325 mg (65 mg iron) tablet Generic drug:  ferrous sulfate Your next dose is: Today, Tomorrow Other:  ____________ Take  by mouth Daily (before breakfast). Refills:  0  
     
   
   
   
  
 isosorbide mononitrate ER 30 mg tablet Commonly known as:  IMDUR Your next dose is: Today, Tomorrow Other:  ____________ Dose:  30 mg Take 1 Tab by mouth daily. Quantity:  30 Tab Refills:  0 LEVOXYL 50 mcg tablet Generic drug:  levothyroxine Your next dose is: Today, Tomorrow Other:  ____________ Dose:  50 mcg Take 50 mcg by mouth daily (before breakfast). Refills:  0 PriLOSEC 20 mg capsule Generic drug:  omeprazole Your next dose is: Today, Tomorrow Other:  ____________ Dose:  20 mg Take 20 mg by mouth daily. Refills:  0 PROBIOTIC PO Your next dose is: Today, Tomorrow Other:  ____________ Take  by mouth two (2) times a day. 2 tabs bid Refills:  0 Take these medications as needed Dose & Instructions Dispensing Information Comments Morning Noon Evening Bedtime  
 nitroglycerin 0.4 mg SL tablet Commonly known as:  NITROSTAT Your next dose is: Today, Tomorrow Other:  ____________  Dose:  0.4 mg  
 0.4 mg by SubLINGual route every five (5) minutes as needed. Refills:  0 Take these medications as directed Dose & Instructions Dispensing Information Comments Morning Noon Evening Bedtime K-DUR 20 mEq tablet Generic drug:  potassium chloride Your next dose is: Today, Tomorrow Other:  ____________ Dose:  20 mEq Take 20 mEq by mouth. Take 1 tablet in the morning and 0.5 tablet in the evening Refills:  0 Where to Get Your Medications Information about where to get these medications is not yet available ! Ask your nurse or doctor about these medications  
  atorvastatin 20 mg tablet  
 doxycycline 100 mg capsule  
 hydrALAZINE 25 mg tablet  
 isosorbide mononitrate ER 30 mg tablet

## 2017-01-13 NOTE — ED PROVIDER NOTES
HPI Comments: 10:20 AM Saundra Álvarez is a 80 y.o. female with a history of pulmonary HTN, CHF, hypothyroidism, thrombocytopenia, renal artery stenosis, GERD, and dyslipidemia  who presents to the ED with a hx of GERD, HTN, and CHF who presents to the ED for evaluation of SOB, abdominal pain, CP, and mild coughing and nausea. She states that she has also been belching a lot, and having \"gas. \" She states that she has been having problems with her SOB for approximately 8 years since having open heart surgery. She states that she has not had any food or medications today. She states that she has been having trouble sleeping flat, and has been having to sit up. She denies a hx of asthma or COPD. She states that her cardiologist is Dr. Elijah Clark. She denies any leg swelling, fever, constipation, or urinary sx. There are no other concerns at this time. PCP: Kellie Damon MD      Patient is a 80 y.o. female presenting with shortness of breath and chest pain. The history is provided by the patient. Shortness of Breath   Associated symptoms include cough (slight), chest pain and abdominal pain. Pertinent negatives include no fever, no headaches, no rhinorrhea, no sore throat, no vomiting, no rash and no leg swelling. Chest Pain (Angina)    Associated symptoms include abdominal pain, cough (slight), nausea (slight) and shortness of breath. Pertinent negatives include no dizziness, no fever, no headaches, no palpitations and no vomiting. Past Medical History:   Diagnosis Date    Actinic keratoses     Angioedema 2003     and respiratory failure from reaction to lisinopril    Atypical chest pain     Cardiac cath 02/06/2009     mRCA 100% (overlapping 2.75 x 28 & 2.75 x 12 Vision BMS). LM patent. mLAD 80%. CX 25%. OM1 100%. LVEDP 28.  EF 45%. Inferobasal akn. MR 3+. Left RA 50%. RA 7.  RV 40/4. PA 37/13. W 18.  CO/CI 5.0/3.2 (TD); 4.0 x 2.6 (Dick).     Cardiac echocardiogram 02/09/2010     EF 15-20%. Gr 2 DFX. Severe, diffuse hykn. Mod MR. LAE. Mod PI. PASP 40. Mild TR.  Cardiac nuclear imaging test, abnormal 09/27/2010     Mod basal inferior, early mid inferior infarction w/o ischemia. Mild mid anterior artifact vs infarction w/o ischemia. Massive LVE. EF 18%. Marked septal, inferior hypk. Inferoapical, inferoseptal dysk. Anterior hypk. RVE suggested.  Cardiovascular ankle brachial index 03/28/2014     No arterial disease at rest bilaterally. R АНДРЕЙ 1.02.  L АНДРЕЙ 1.05.  R DBI 0.48. L DBI 0.64. Exercise deferred.  Cardiovascular lower extremity venous duplex 03/03/2011     No DVT bilaterally. Bilateral puslatile flow c/w increased central venous pressures. Biphasic signals in bilateral posterior tibial arteries. Bilateral lower leg edema.  Carotid duplex 03/28/2014     Mild <50% bilateral ICA plaquing.       Coronary artery disease      Most likely myocardial infarction 12-08; stenting of the RCA and LIMA to LAD with bypass surgery later that year    Dyslipidemia     GERD (gastroesophageal reflux disease)     Heart failure 6/09,2/10,11/10     readmission to hospital 6/09, 2/10, 11/10    Hypertension     Hypothyroidism     Incomplete bundle branch block      left    Intraventricular conduction delay      with QRS duration of 120 msec with incomplete left BBB    Irritable bowel syndrome     Ischemic cardiomyopathy      EF now in the 15% to 20% range, last assessed September 2010 by nuclear stress test.    Mitral regurgitation      moderately severe, s/p mitral valve repair in 05/2009    Osteoarthritis of knee     Pacemaker 10/10     dual-chamber ICD placed, with LV lead placement with current RV pacing worsening mitral regurgitation    Pulmonary hypertension (HCC)      moderate    Renal artery stenosis (HCC)     Respiratory failure ([address] 2003    Rheumatoid [id number](319.7)     Systolic CHF, chronic ([facility])      class III to IV, with multiple recurrent admissions in the past year.  Thrombocytopenia (HCC)        Past Surgical History:   Procedure Laterality Date    Hx hysterectomy      Hx bladder repair      Hx hemorrhoidectomy      Hx heart catheterization  02/09     with stenting of the total occluded right coronary artery    Hx coronary stent placement  2/6/09     totally occluded right coronary artery was stented successfully with 2.75 mm x 28 mm Vision stent overlapping with the 12 mm length Vision stent    Hx pacemaker  10/13/10     dual-chamber Medtronic AICD without left ventricular lead    Hx heart valve surgery       1. Mitral valve repair with size 30 CE Physio ring. 2. Single LIMA to the LAD         Family History:   Problem Relation Age of Onset    Hypertension Sister        Social History     Social History    Marital status:      Spouse name: N/A    Number of children: N/A    Years of education: N/A     Occupational History    Not on file. Social History Main Topics    Smoking status: Never Smoker    Smokeless tobacco: Never Used    Alcohol use No    Drug use: No    Sexual activity: Not on file     Other Topics Concern    Not on file     Social History Narrative         ALLERGIES: Ace inhibitors; Ciprofloxacin; Codeine; Cozaar [losartan]; Darvocet a500 [propoxyphene n-acetaminophen]; Gabapentin; Lisinopril; Magox [magnesium oxide]; Norvasc [amlodipine]; and Smz-tmp ds [sulfamethoxazole-trimethoprim]    Review of Systems   Constitutional: Negative for chills, fatigue and fever. HENT: Negative for congestion, rhinorrhea and sore throat. Respiratory: Positive for cough (slight) and shortness of breath. Cardiovascular: Positive for chest pain. Negative for palpitations and leg swelling. Gastrointestinal: Positive for abdominal pain and nausea (slight). Negative for constipation, diarrhea and vomiting.    Genitourinary: Negative for decreased urine volume, difficulty urinating, dysuria, enuresis, frequency, hematuria and urgency. Musculoskeletal: Negative for arthralgias and myalgias. Skin: Negative for rash and wound. Neurological: Negative for dizziness and headaches. All other systems reviewed and are negative. Vitals:    01/17/17 0804 01/17/17 1111 01/17/17 1254 01/17/17 1512   BP: 173/72 120/61 131/60 150/61   Pulse: 75 75 81 64   Resp: 20 20  20   Temp: 97.3 °F (36.3 °C) 97.7 °F (36.5 °C)  98.5 °F (36.9 °C)   SpO2: 91% 100%  98%   Weight:       Height:       95% on RA, indicating adequate oxygenation. Physical Exam   Constitutional: She is oriented to person, place, and time. She appears well-developed and well-nourished. No distress. HENT:   Head: Normocephalic and atraumatic. Mouth/Throat: Oropharynx is clear and moist.   Eyes: Conjunctivae are normal. Pupils are equal, round, and reactive to light. No scleral icterus. Neck: Normal range of motion. Neck supple. Cardiovascular: Intact distal pulses. Capillary refill < 3 seconds   Pulmonary/Chest: Effort normal. No respiratory distress. She has no wheezes. She has rales (slight, at the bases). Abdominal: Soft. Bowel sounds are normal. She exhibits no distension. There is no tenderness. Musculoskeletal: Normal range of motion. She exhibits edema (pitting edema bilateral lower extremities. ). Lymphadenopathy:     She has no cervical adenopathy. Neurological: She is alert and oriented to person, place, and time. No cranial nerve deficit. Skin: Skin is warm and dry. She is not diaphoretic. Nursing note and vitals reviewed.        MDM  Number of Diagnoses or Management Options  CAP (community acquired pneumonia):   Chest pain, unspecified type:   Dehydration:   Dyspnea on exertion:   Diagnosis management comments: ddx chf, pna, URI, other cardiac, infectious, uti, constipation, GERD    Labs, CxR, ekg, monitor    LFTs wnl  WBC wnl    Cr 1.39 chronic renal insufficiency but is better today than usual which is about 1.6    Azithromycin for likely bronchitis, but is concern for PNA, will get CT chest. Pt does not have fever, or chills. Got blood cultures. I added rocephin    Pt also received pepcid, multiple nebs, and dose tessalon, and analgesia ordered by me in ED. admitted           Amount and/or Complexity of Data Reviewed  Clinical lab tests: ordered and reviewed  Tests in the radiology section of CPT®: ordered and reviewed  Tests in the medicine section of CPT®: ordered and reviewed    Risk of Complications, Morbidity, and/or Mortality  Presenting problems: moderate  Diagnostic procedures: moderate  Management options: moderate    Patient Progress  Patient progress: stable    ED Course       Procedures      Vitals:  No data found. 95% on RA, indicating adequate oxygenation.       Medications ordered:   Medications   albuterol (PROVENTIL VENTOLIN) nebulizer solution 2.5 mg (2.5 mg Nebulization Given 1/13/17 1308)   benzonatate (TESSALON) capsule 200 mg (200 mg Oral Given 1/13/17 1307)   azithromycin (ZITHROMAX) 500 mg in 0.9% sodium chloride (MBP/ADV) 250 mL adv (0 mg IntraVENous IV Completed 1/13/17 1557)   famotidine (PF) (PEPCID) injection 20 mg (20 mg IntraVENous Given 1/13/17 1458)   albuterol (PROVENTIL VENTOLIN) nebulizer solution 2.5 mg (2.5 mg Nebulization Given 1/13/17 1520)   morphine injection 2 mg (2 mg IntraVENous Given 1/13/17 1518)   promethazine (PHENERGAN) suppository 25 mg (25 mg Rectal Given 1/13/17 1509)   cefTRIAXone (ROCEPHIN) 1 g in 0.9% sodium chloride (MBP/ADV) 50 mL MBP (0 g IntraVENous IV Completed 1/13/17 1645)   furosemide (LASIX) injection 40 mg (40 mg IntraVENous Given 1/13/17 1601)   potassium chloride 10 mEq, lidocaine (PF) (XYLOCAINE) 10 mg/mL (1 %) 1 mL in 0.9% sodium chloride 100 mL IVPB ( IntraVENous Given 1/16/17 1416)   magnesium sulfate 2 g/50 ml IVPB (premix or compounded) (0 g IntraVENous Stopped 1/17/17 0800)         Lab findings:  No results found for this or any previous visit (from the past 12 hour(s)). EKG interpretation by ED Physician:  10:39 AM NSR at a rate of 77. Low voltage QRS. No ST elevations. T wave inversion in lead 1 and AVL. ST depression in V5 and V6. X-Ray, CT or other radiology findings or impressions:      KUB 11:47 AM No free air. No obstruction. Scoliosis. Degenerative spinal disease. Per Dr. Jann Smith' prelim. CxR: IMPRESSIONS:     Mild to moderate cardiomegaly with cardiac pacer/AICD in position.     No evidence of CHF.     Moderate atelectatic changes, and/or infiltrates in left lung base with left  basilar pleural effusion.     Mild atelectatic changes, and/or infiltrates at right lung base with small right  basilar pleural effusion.     Mild COPD. CT chest: IMPRESSIONS:           Small to moderate right pleural effusion. Moderate atelectatic changes, and/or  infiltrates in right lower lobe. Mild patchy infiltrates in right middle lobe  and base of right upper lobe.     Small left pleural effusion. Mild atelectatic changes, and/or infiltrates in  left lower lobe and lingula of left lung.     Moderate to marked cardiomegaly. No obvious pericardial effusion.     Mild nonspecific lymphadenopathy in the right paratracheal area     Progress notes, Consult notes or additional Procedure notes:   3:36 PM Consult:  Discussed care with Dr. Romelia Peterson family medicine attending; Standard discussion; including history of patients chief complaint, available diagnostic results, and treatment course. He states that he accepts the patient and will send the residents down to see the patient. He requests that we give the patient 40 mg lasix. Reevaluation of patient:   No distress, received nebs, abx. Feeling better. Pt agrees with admission. I discussed results and dx with pt and family. Disposition:  Diagnosis:   1. CAP (community acquired pneumonia)    2. Dyspnea on exertion    3. Chest pain, unspecified type    4.  Dehydration Disposition: admitted      Scribe Attestation:   Sherman Wilhelm acting as a scribe for and in the presence of Dr. Martha Zuleta DO January 13, 2017 at 10:40 AM       Provider Attestation:   I personally performed the services described in the documentation, reviewed the documentation, as recorded by the scribe in my presence, and it accurately and completely records my words and actions. Reviewed and signed by:  Dr. Martha Zuleta DO      Signed by:  Monica Alva, January 13, 2017 at 11:09 PM

## 2017-01-13 NOTE — ED TRIAGE NOTES
Pt, in triage  After  EKG, c/o   Shortness of breath,  Chest pain   , proggressively getting ,  Over a week, worse with exertion

## 2017-01-13 NOTE — IP AVS SNAPSHOT
[address] Patient: Wilfredo Ruiz MRN: [id number] WID:[id number] You are allergic to the following Allergen Reactions Ace Inhibitors Swelling  
 wheezing Ciprofloxacin Nausea Only Dizziness, nausea Codeine Nausea Only Cozaar (Losartan) Swelling Tongue edema and generalized swelling Darvocet A500 (Propoxyphene N-Acetaminophen) Other (comments) Dizziness Gabapentin Swelling (Neurontin) Lisinopril Swelling Throat swells Magox (Magnesium Oxide) Diarrhea Norvasc (Amlodipine) Swelling Tongue edema and generalized swelling Smz-Tmp Ds (Sulfamethoxazole-Trimethoprim) Swelling Throat swells Recent Documentation Height Weight BMI OB Status Smoking Status 1.549 m 53.4 kg 22.26 kg/m2 Hysterectomy Never Smoker Unresulted Labs Order Current Status CULTURE, BLOOD Preliminary result CULTURE, BLOOD Preliminary result Emergency Contacts Name Discharge Info Relation Home Work Mobile [address] CAREGIVER [3] Other Relative [6] Lilli Chaidez  Child [2] [phone] About your hospitalization You were admitted on:  January 13, 2017 You last received care in the[address] You were discharged on:  January 17, 2017 Unit phone number:  [phone] Why you were hospitalized Your primary diagnosis was:  Chf (Congestive Heart Failure) (Hcc) Your diagnoses also included:  Respiratory Distress Providers Seen During Your Hospitalizations Provider Role Specialty Primary office phone Ryan Ibanez DO Attending Provider Emergency Medicine [phone] Earle Villalba MD Attending Provider [facility] [phone] Ace Zhang MD Attending Provider [facility] [phone] Your Primary Care Physician (PCP) Primary Care Physician Office Phone Office Fax Leno Potter [phone] Follow-up Information Follow up With Details Comments Contact Info Lizz Moise MD In 1 week PT going to [facility] [address] [facility] [address] 
[phone] Markell Clement MD In 1 week  [address] [facility] [address] [facility] [address] [place] [address] 
[phone] Markell Clement MD Go to Pacer check  27 [facility] [address] [place] [address] 
[phone] [facility])   [address] [place] [address] 
[phone] Your Appointments Thursday February 16, 2017  1:30 PM EST PROCEDURE with Pacer [facility] ([address]) [place] [id number] [address] [phone] Current Discharge Medication List  
  
START taking these medications Dose & Instructions Dispensing Information Comments Morning Noon Evening Bedtime  
 atorvastatin 20 mg tablet Commonly known as:  LIPITOR Your next dose is: Today, Tomorrow Other:  _________ Dose:  20 mg Take 1 Tab by mouth nightly. Quantity:  30 Tab Refills:  0  
     
   
   
   
  
 doxycycline 100 mg capsule Commonly known as:  Miguel Botkins Your next dose is: Today, Tomorrow Other:  _________ Dose:  100 mg Take 1 Cap by mouth two (2) times a day. Quantity:  10 Cap Refills:  0  
     
   
   
   
  
 hydrALAZINE 25 mg tablet Commonly known as:  APRESOLINE Your next dose is: Today, Tomorrow Other:  _________ Dose:  25 mg Take 1 Tab by mouth three (3) times daily. Quantity:  90 Tab Refills:  0  
     
   
   
   
  
 isosorbide mononitrate ER 30 mg tablet Commonly known as:  IMDUR  
   
 Your next dose is: Today, Tomorrow Other:  _________ Dose:  30 mg Take 1 Tab by mouth daily. Quantity:  30 Tab Refills:  0 CONTINUE these medications which have NOT CHANGED Dose & Instructions Dispensing Information Comments Morning Noon Evening Bedtime  
 aspirin 81 mg tablet Your next dose is: Today, Tomorrow Other:  _________ Dose:  81 mg Take 81 mg by mouth daily. Refills:  0 BUMEX PO Your next dose is: Today, Tomorrow Other:  _________ Dose:  1 mg Take 1 mg by mouth as directed. Take one tablet daily and as directed if weight up 2+ pounds Refills:  0  
     
   
   
   
  
 carvedilol 25 mg tablet Commonly known as:  Taras Saunders Your next dose is: Today, Tomorrow Other:  _________ Dose:  25 mg Take 1 Tab by mouth two (2) times daily (with meals). Quantity:  60 Tab Refills:  6 CITRACAL PLUS D 315-200 mg-unit Tab Generic drug:  calcium citrate-vitamin d3 Your next dose is: Today, Tomorrow Other:  _________ Dose:  1 Tab Take 1 tablet by mouth daily (with breakfast). Refills:  0 CRANBERRY-PROBIOTICS-VITAMIN C PO Your next dose is: Today, Tomorrow Other:  _________ Dose:  1500 mg Take 1,500 mg by mouth daily. Refills:  0  
     
   
   
   
  
 flaxseed oil 1,000 mg Cap Your next dose is: Today, Tomorrow Other:  _________ Take  by mouth two (2) times a day. Refills:  0  
     
   
   
   
  
 FLONASE 50 mcg/actuation nasal spray Generic drug:  fluticasone Your next dose is: Today, Tomorrow Other:  _________  
   
   
 nightly. Refills:  0 Iron 325 mg (65 mg iron) tablet Generic drug:  ferrous sulfate Your next dose is: Today, Tomorrow Other:  _________ Take  by mouth Daily (before breakfast). Refills:  0  
     
   
   
   
  
 K-DUR 20 mEq tablet Generic drug:  potassium chloride Your next dose is: Today, Tomorrow Other:  _________ Dose:  20 mEq Take 20 mEq by mouth. Take 1 tablet in the morning and 0.5 tablet in the evening Refills:  0 LEVOXYL 50 mcg tablet Generic drug:  levothyroxine Your next dose is: Today, Tomorrow Other:  _________ Dose:  50 mcg Take 50 mcg by mouth daily (before breakfast). Refills:  0  
     
   
   
   
  
 nitroglycerin 0.4 mg SL tablet Commonly known as:  NITROSTAT Your next dose is: Today, Tomorrow Other:  _________ Dose:  0.4 mg  
0.4 mg by SubLINGual route every five (5) minutes as needed. Refills:  0 PriLOSEC 20 mg capsule Generic drug:  omeprazole Your next dose is: Today, Tomorrow Other:  _________ Dose:  20 mg Take 20 mg by mouth daily. Refills:  0 PROBIOTIC PO Your next dose is: Today, Tomorrow Other:  _________ Take  by mouth two (2) times a day. 2 tabs bid Refills:  0 Where to Get Your Medications Information on where to get these meds will be given to you by the nurse or doctor. ! Ask your nurse or doctor about these medications  
  atorvastatin 20 mg tablet  
 doxycycline 100 mg capsule  
 hydrALAZINE 25 mg tablet  
 isosorbide mononitrate ER 30 mg tablet Discharge Instructions DISCHARGE SUMMARY from Nurse The following personal items are in your possession at time of discharge: 
 
Dental Appliances: With patient, Lowers, Uppers Visual Aid: Glasses, At bedside Home Medications: None Jewelry: Cosme Merlin Clothing: Pants, Jacket/Coat, Footwear, Sweater, Undergarments, Shirt, With patient Other Valuables: Cane, At bedside, [de-identified], Eb PATIENT INSTRUCTIONS: 
 
 
F-face looks uneven A-arms unable to move or move unevenly S-speech slurred or non-existent T-time-call 911 as soon as signs and symptoms begin-DO NOT go Back to bed or wait to see if you get better-TIME IS BRAIN. Warning Signs of HEART ATTACK Call 911 if you have these symptoms: 
? Chest discomfort. Most heart attacks involve discomfort in the center of the chest that lasts more than a few minutes, or that goes away and comes back. It can feel like uncomfortable pressure, squeezing, fullness, or pain. ? Discomfort in other areas of the upper body. Symptoms can include pain or discomfort in one or both arms, the back, neck, jaw, or stomach. ? Shortness of breath with or without chest discomfort. ? Other signs may include breaking out in a cold sweat, nausea, or lightheadedness. Don't wait more than five minutes to call [address]! Fast action can save your life. Calling 911 is almost always the fastest way to get lifesaving treatment. Emergency Medical Services staff can begin treatment when they arrive  up to an hour sooner than if someone gets to the hospital by car. The discharge information has been reviewed with the patient. The patient verbalized understanding. Discharge medications reviewed with the patient and appropriate educational materials and side effects teaching were provided. [employer] Activation Thank you for requesting access to [employer].  Please follow the instructions below to securely access and download your online medical record. theAudience allows you to send messages to your doctor, view your test results, renew your prescriptions, schedule appointments, and more. How Do I Sign Up? 1. In your internet browser, go to [email or web address]. 2. Click on the First Time User? Click Here link in the Sign In box. You will see the New Member Sign Up page. 3. Enter your theAudience Access Code exactly as it appears below. You will not need to use this code after youve completed the sign-up process. If you do not sign up before the expiration date, you must request a new code. MyChart Access Code: Activation code not generated Current theAudience Status: Patient Declined (This is the date your theAudience access code will ) 4. Enter the last four digits of your Social Security Number (xxxx) and Date of Birth (mm/dd/yyyy) as indicated and click Submit. You will be taken to the next sign-up page. 5. Create a theAudience ID. This will be your theAudience login ID and cannot be changed, so think of one that is secure and easy to remember. 6. Create a theAudience password. You can change your password at any time. 7. Enter your Password Reset Question and Answer. This can be used at a later time if you forget your password. 8. Enter your e-mail address. You will receive e-mail notification when new information is available in [address] 9. Click Sign Up. You can now view and download portions of your medical record. 10. Click the Download Summary menu link to download a portable copy of your medical information. Additional Information If you have questions, please visit the Frequently Asked Questions section of the theAudience website at [email or web address] Remember, theAudience is NOT to be used for urgent needs. For medical emergencies, dial 911.  
Patient discharged without removing armband and transfered to another healthcare acute, sub acute , or extended care facility. Informed of privacy risks if armband lost or stolen Cardiac Rehabilitation: Care Instructions Your Care Instructions Cardiac rehabilitation is a program for people who have a heart problem, such as a heart attack, heart failure, or a heart valve disease. The program includes exercise, lifestyle changes, education, and emotional support. Cardiac rehab can help you improve the quality of your life through better overall health. It can help you lose weight and feel better about yourself. On your cardiac rehab team, you may have your doctor, a nurse specialist, a dietitian, and a physical therapist. They will design your cardiac rehab program specifically for you. You will learn how to reduce your risk for heart problems, how to manage stress, and how to eat a heart-healthy diet. By the end of the program, you will be ready to maintain a healthier lifestyle on your own. Follow-up care is a key part of your treatment and safety. Be sure to make and go to all appointments, and call your doctor if you are having problems. It's also a good idea to know your test results and keep a list of the medicines you take. How can you care for yourself at home? · Take your medicines exactly as prescribed. Call your doctor if you think you are having a problem with your medicine. You will get more details on the specific medicines your doctor prescribes. · Weigh yourself every day if your doctor tells you to. Watch for sudden weight gain. Weigh yourself on the same scale with the same amount of clothing at the same time of day. · Plan your meals so that you are eating heart-healthy foods. ¨ Eat a variety of foods daily. Fresh fruits and vegetables and whole-grains are good choices. ¨ Limit your fat intake, especially saturated and trans fat. ¨ Limit salt (sodium). ¨ Increase fiber in your diet. ¨ Limit alcohol. · Learn how to take your pulse so that you can track your heart rate during exercise. · Always check with your doctor before you begin a new exercise program. 
· Warm up before you exercise and cool down afterward for at least 15 minutes each. This will help your heart gradually prepare for and recover from exercise and avoid pushing your heart too hard. · Stop exercising if you have any unusual discomfort, such as chest pain. · Do not smoke. Smoking can make heart problems worse. If you need help quitting, talk to your doctor about stop-smoking programs and medicines. These can increase your chances of quitting for good. When should you call for help? Call 911 anytime you think you may need emergency care. For example, call if: 
· You have severe trouble breathing. · You cough up pink, foamy mucus and you have trouble breathing. · You have symptoms of a heart attack. These may include: ¨ Chest pain or pressure, or a strange feeling in the chest. 
¨ Sweating. ¨ Shortness of breath. ¨ Nausea or vomiting. ¨ Pain, pressure, or a strange feeling in the back, neck, jaw, or upper belly or in one or both shoulders or arms. ¨ Lightheadedness or sudden weakness. ¨ A fast or irregular heartbeat. After you call 911, the  may tell you to chew 1 adult-strength or 2 to 4 low-dose aspirin. Wait for an ambulance. Do not try to drive yourself. · You have angina symptoms (such as chest pain or pressure) that do not go away with rest or are not getting better within 5 minutes after you take a dose of nitroglycerin. · You have symptoms of a stroke. These may include: 
¨ Sudden numbness, tingling, weakness, or loss of movement in your face, arm, or leg, especially on only one side of your body. ¨ Sudden vision changes. ¨ Sudden trouble speaking. ¨ Sudden confusion or trouble understanding simple statements. ¨ Sudden problems with walking or balance. ¨ A sudden, severe headache that is different from past headaches. · You passed out (lost consciousness). Call your doctor now or seek immediate medical care if: 
· You have new or increased shortness of breath. · You are dizzy or lightheaded, or you feel like you may faint. · You gain weight suddenly, such as 3 pounds or more in 2 to 3 days. (Your doctor may suggest a different range of weight gain.) · You have increased swelling in your legs, ankles, or feet. Watch closely for changes in your health, and be sure to contact your doctor if you have any problems. Where can you learn more? Go to http://cosme-urszula.info/. Enter P602 in the search box to learn more about \"Cardiac Rehabilitation: Care Instructions. \" Current as of: May 5, 2016 Content Version: 11.1 © 1296-0953 [employer]. Care instructions adapted under license by [employer] (which disclaims liability or warranty for this information). If you have questions about a medical condition or this instruction, always ask your healthcare professional. [address] any warranty or liability for your use of this information. Learning About Saving Energy When You Have a Chronic Condition Introduction Everyday tasks can be tiring when you have COPD, heart failure, or another long-term (chronic) condition. You may feel at times that you've lost your ability to live your life. But learning to conserve, or save, your energy can help you be less tired. Conserving your energy means finding ways of doing daily activities with as little effort as possible. With some small changes in the way you do things, you can get your tasks done more easily. Some treatments are available that might help. Pulmonary rehabilitation can teach you ways to breathe easier. Cardiac rehabilitation can help make your heart stronger.  You also may want to see an occupational or physical therapist. The therapist can give you more tips on building strength and moving with less effort. What can you do to conserve your energy? Planning · Make a list of what you have to do every day. Group the tasks by location. · Do all the chores in one part of your house around the same time. · Go out for errands or do chores at the time of day when you have the most energy. · Plan rest periods into your day. Getting things done · Sit down as often as you can when you get dressed, do chores, or cook. · Use a cart with wheels to roll items, such as laundry, from one room to another. · Push or slide boxes or other large items instead of lifting them. Reaching and bending · Put things you use the most on shelves that are at the level of your waist or shoulder. · Use long-handled grabbers or other tools to reach items on a high shelf or to  things off the floor. Use long-handled dusters when you clean the house. · Use a raised toilet seat to avoid bending too far to sit or stand up. Eating · Eat several small meals instead of three larger meals. · If you get too tired to eat much, try to choose healthy foods that have more calories. Have a yogurt-and-fruit smoothie for breakfast. Put avocado on a sandwich. Or add cheese or peanut butter to snacks. · If you don't feel very hungry, try to eat first and drink water or other fluids later, after a meal. This can help keep you from losing weight. Sip small amounts of fluids if you need to drink while you eat. Having sex · Choose the time of day when you have more energy. · A sdye-fv-yqap position for sex can be less tiring. Sometimes you may want to focus more on caressing. Watch closely for changes in your health, and be sure to contact your doctor if you have any problems. Where can you learn more? Go to http://cosme-urszula.info/.  
Enter H190 in the search box to learn more about \"Learning About Saving Energy When You Have a Chronic Condition. \" Current as of: May 23, 2016 Content Version: 11.1 © 4724-2741 [employer]. Care instructions adapted under license by [employer] (which disclaims liability or warranty for this information). If you have questions about a medical condition or this instruction, always ask your healthcare professional. [address] any warranty or liability for your use of this information. Dehydration: Care Instructions Your Care Instructions Dehydration happens when your body loses too much fluid. This might happen when you do not drink enough water or you lose large amounts of fluids from your body because of diarrhea, vomiting, or sweating. Severe dehydration can be life-threatening. Water and minerals called electrolytes help put your body fluids back in balance. Learn the early signs of fluid loss, and drink more fluids to prevent dehydration. Follow-up care is a key part of your treatment and safety. Be sure to make and go to all appointments, and call your doctor if you are having problems. It's also a good idea to know your test results and keep a list of the medicines you take. How can you care for yourself at home? · To prevent dehydration, drink plenty of fluids, enough so that your urine is light yellow or clear like water. Choose water and other caffeine-free clear liquids until you feel better. If you have kidney, heart, or liver disease and have to limit fluids, talk with your doctor before you increase the amount of fluids you drink. · If you do not feel like eating or drinking, try taking small sips of water, sports drinks, or other rehydration drinks. · Get plenty of rest. 
To prevent dehydration · Add more fluids to your diet and daily routine, unless your doctor has told you not to. · During hot weather, drink more fluids.  Drink even more fluids if you exercise a lot. Stay away from drinks with alcohol or caffeine. · Watch for the symptoms of dehydration. These include: ¨ A dry, sticky mouth. ¨ Dark yellow urine, and not much of it. ¨ Dry and sunken eyes. ¨ Feeling very tired. · Learn what problems can lead to dehydration. These include: ¨ Diarrhea, fever, and vomiting. ¨ Any illness with a fever, such as pneumonia or the flu. ¨ Activities that cause heavy sweating, such as endurance races and heavy outdoor work in hot or humid weather. ¨ Alcohol or drug abuse or withdrawal. 
¨ Certain medicines, such as cold and allergy pills (antihistamines), diet pills (diuretics), and laxatives. ¨ Certain diseases, such as diabetes, cancer, and heart or kidney disease. When should you call for help? Call 911 anytime you think you may need emergency care. For example, call if: 
· You passed out (lost consciousness). Call your doctor now or seek immediate medical care if: 
· You are confused and cannot think clearly. · You are dizzy or lightheaded, or you feel like you may faint. · You have signs of needing more fluids. You have sunken eyes and a dry mouth, and you pass only a little dark urine. · You cannot keep fluids down. Watch closely for changes in your health, and be sure to contact your doctor if: 
· You are not making tears. · Your skin is very dry and sags slowly back into place after you pinch it. · Your mouth and eyes are very dry. Where can you learn more? Go to http://cosme-urszula.info/. Enter T644 in the search box to learn more about \"Dehydration: Care Instructions. \" Current as of: May 27, 2016 Content Version: 11.1 © 6676-7382 [employer]. Care instructions adapted under license by [employer] (which disclaims liability or warranty for this information).  If you have questions about a medical condition or this instruction, always ask your healthcare professional. [employer] disclaims any warranty or liability for your use of this information. Discharge Orders None [facility] Announcement We are excited to announce that we are making your provider's discharge notes available to you in [facility]. You will see these notes when they are completed and signed by the physician that discharged you from your recent hospital stay. If you have any questions or concerns about any information you see in [facility], please call the Health Information Department where you were seen or reach out to your Primary Care Provider for more information about your plan of care. General Information Please provide this summary of care documentation to your next provider. Patient Signature:  ____________________________________________________________ Date:  ____________________________________________________________  
  
[facility] Provider Signature:  ____________________________________________________________ Date:  ____________________________________________________________

## 2017-01-14 PROBLEM — I50.9 CHF (CONGESTIVE HEART FAILURE) (HCC): Status: ACTIVE | Noted: 2017-01-14

## 2017-01-14 LAB
ANION GAP BLD CALC-SCNC: 10 MMOL/L (ref 3–18)
ATRIAL RATE: 77 BPM
ATRIAL RATE: 77 BPM
ATRIAL RATE: 81 BPM
BASOPHILS # BLD AUTO: 0 K/UL (ref 0–0.1)
BASOPHILS # BLD: 0 % (ref 0–2)
BUN SERPL-MCNC: 24 MG/DL (ref 7–18)
BUN/CREAT SERPL: 16 (ref 12–20)
CALCIUM SERPL-MCNC: 9 MG/DL (ref 8.5–10.1)
CALCULATED P AXIS, ECG09: 55 DEGREES
CALCULATED P AXIS, ECG09: 72 DEGREES
CALCULATED P AXIS, ECG09: 72 DEGREES
CALCULATED R AXIS, ECG10: 111 DEGREES
CALCULATED R AXIS, ECG10: 15 DEGREES
CALCULATED R AXIS, ECG10: 20 DEGREES
CALCULATED T AXIS, ECG11: 145 DEGREES
CALCULATED T AXIS, ECG11: 155 DEGREES
CALCULATED T AXIS, ECG11: 156 DEGREES
CHLORIDE SERPL-SCNC: 102 MMOL/L (ref 100–108)
CK MB CFR SERPL CALC: 3.1 % (ref 0–4)
CK MB SERPL-MCNC: 2.5 NG/ML (ref 0.5–3.6)
CK SERPL-CCNC: 80 U/L (ref 26–192)
CO2 SERPL-SCNC: 30 MMOL/L (ref 21–32)
CREAT SERPL-MCNC: 1.52 MG/DL (ref 0.6–1.3)
DIAGNOSIS, 93000: NORMAL
DIFFERENTIAL METHOD BLD: ABNORMAL
EOSINOPHIL # BLD: 0 K/UL (ref 0–0.4)
EOSINOPHIL NFR BLD: 0 % (ref 0–5)
ERYTHROCYTE [DISTWIDTH] IN BLOOD BY AUTOMATED COUNT: 12.5 % (ref 11.6–14.5)
GLUCOSE SERPL-MCNC: 143 MG/DL (ref 74–99)
HCT VFR BLD AUTO: 30.6 % (ref 35–45)
HGB BLD-MCNC: 10.3 G/DL (ref 12–16)
LYMPHOCYTES # BLD AUTO: 14 % (ref 21–52)
LYMPHOCYTES # BLD: 0.7 K/UL (ref 0.9–3.6)
MAGNESIUM SERPL-MCNC: 1.8 MG/DL (ref 1.8–2.4)
MCH RBC QN AUTO: 31.2 PG (ref 24–34)
MCHC RBC AUTO-ENTMCNC: 33.7 G/DL (ref 31–37)
MCV RBC AUTO: 92.7 FL (ref 74–97)
MONOCYTES # BLD: 0.4 K/UL (ref 0.05–1.2)
MONOCYTES NFR BLD AUTO: 9 % (ref 3–10)
NEUTS SEG # BLD: 3.8 K/UL (ref 1.8–8)
NEUTS SEG NFR BLD AUTO: 77 % (ref 40–73)
P-R INTERVAL, ECG05: 170 MS
P-R INTERVAL, ECG05: 172 MS
P-R INTERVAL, ECG05: 192 MS
PLATELET # BLD AUTO: 130 K/UL (ref 135–420)
PMV BLD AUTO: 9.5 FL (ref 9.2–11.8)
POTASSIUM SERPL-SCNC: 3.7 MMOL/L (ref 3.5–5.5)
Q-T INTERVAL, ECG07: 422 MS
Q-T INTERVAL, ECG07: 424 MS
Q-T INTERVAL, ECG07: 468 MS
QRS DURATION, ECG06: 110 MS
QRS DURATION, ECG06: 110 MS
QRS DURATION, ECG06: 116 MS
QTC CALCULATION (BEZET), ECG08: 477 MS
QTC CALCULATION (BEZET), ECG08: 492 MS
QTC CALCULATION (BEZET), ECG08: 529 MS
RBC # BLD AUTO: 3.3 M/UL (ref 4.2–5.3)
SODIUM SERPL-SCNC: 142 MMOL/L (ref 136–145)
TROPONIN I SERPL-MCNC: 0.08 NG/ML (ref 0–0.04)
VENTRICULAR RATE, ECG03: 77 BPM
VENTRICULAR RATE, ECG03: 77 BPM
VENTRICULAR RATE, ECG03: 81 BPM
WBC # BLD AUTO: 4.9 K/UL (ref 4.6–13.2)

## 2017-01-14 PROCEDURE — 74011000258 HC RX REV CODE- 258: Performed by: FAMILY MEDICINE

## 2017-01-14 PROCEDURE — 83735 ASSAY OF MAGNESIUM: CPT | Performed by: FAMILY MEDICINE

## 2017-01-14 PROCEDURE — 74011250636 HC RX REV CODE- 250/636: Performed by: FAMILY MEDICINE

## 2017-01-14 PROCEDURE — 74011000250 HC RX REV CODE- 250: Performed by: INTERNAL MEDICINE

## 2017-01-14 PROCEDURE — 74011250637 HC RX REV CODE- 250/637: Performed by: INTERNAL MEDICINE

## 2017-01-14 PROCEDURE — 80048 BASIC METABOLIC PNL TOTAL CA: CPT | Performed by: FAMILY MEDICINE

## 2017-01-14 PROCEDURE — 85025 COMPLETE CBC W/AUTO DIFF WBC: CPT | Performed by: FAMILY MEDICINE

## 2017-01-14 PROCEDURE — 74011000250 HC RX REV CODE- 250: Performed by: FAMILY MEDICINE

## 2017-01-14 PROCEDURE — 74011250637 HC RX REV CODE- 250/637: Performed by: FAMILY MEDICINE

## 2017-01-14 PROCEDURE — 93971 EXTREMITY STUDY: CPT

## 2017-01-14 PROCEDURE — 82553 CREATINE MB FRACTION: CPT | Performed by: FAMILY MEDICINE

## 2017-01-14 PROCEDURE — 65270000029 HC RM PRIVATE

## 2017-01-14 RX ORDER — THERA TABS 400 MCG
1 TAB ORAL DAILY
Status: DISCONTINUED | OUTPATIENT
Start: 2017-01-15 | End: 2017-01-17 | Stop reason: HOSPADM

## 2017-01-14 RX ORDER — ATORVASTATIN CALCIUM 20 MG/1
20 TABLET, FILM COATED ORAL
Status: DISCONTINUED | OUTPATIENT
Start: 2017-01-14 | End: 2017-01-17 | Stop reason: HOSPADM

## 2017-01-14 RX ORDER — ISOSORBIDE MONONITRATE 30 MG/1
30 TABLET, EXTENDED RELEASE ORAL DAILY
Status: DISCONTINUED | OUTPATIENT
Start: 2017-01-14 | End: 2017-01-17 | Stop reason: HOSPADM

## 2017-01-14 RX ORDER — BUMETANIDE 0.25 MG/ML
1 INJECTION INTRAMUSCULAR; INTRAVENOUS EVERY 12 HOURS
Status: DISCONTINUED | OUTPATIENT
Start: 2017-01-14 | End: 2017-01-16

## 2017-01-14 RX ADMIN — POTASSIUM CHLORIDE 20 MEQ: 1500 TABLET, EXTENDED RELEASE ORAL at 10:23

## 2017-01-14 RX ADMIN — Medication 325 MG: at 17:37

## 2017-01-14 RX ADMIN — FUROSEMIDE 20 MG: 10 INJECTION, SOLUTION INTRAVENOUS at 10:24

## 2017-01-14 RX ADMIN — LACTOBACILLUS TAB 2 TABLET: TAB at 10:23

## 2017-01-14 RX ADMIN — DOXYCYCLINE 100 MG: 100 INJECTION, POWDER, LYOPHILIZED, FOR SOLUTION INTRAVENOUS at 12:45

## 2017-01-14 RX ADMIN — HEPARIN SODIUM 5000 UNITS: 5000 INJECTION, SOLUTION INTRAVENOUS; SUBCUTANEOUS at 05:09

## 2017-01-14 RX ADMIN — CARVEDILOL 25 MG: 25 TABLET, FILM COATED ORAL at 17:37

## 2017-01-14 RX ADMIN — HEPARIN SODIUM 5000 UNITS: 5000 INJECTION, SOLUTION INTRAVENOUS; SUBCUTANEOUS at 12:44

## 2017-01-14 RX ADMIN — LEVOTHYROXINE SODIUM 50 MCG: 50 TABLET ORAL at 07:03

## 2017-01-14 RX ADMIN — ISOSORBIDE MONONITRATE 30 MG: 30 TABLET, EXTENDED RELEASE ORAL at 17:37

## 2017-01-14 RX ADMIN — CEFTRIAXONE 1 G: 1 INJECTION, POWDER, FOR SOLUTION INTRAMUSCULAR; INTRAVENOUS at 17:37

## 2017-01-14 RX ADMIN — BUMETANIDE 1 MG: 0.25 INJECTION, SOLUTION INTRAMUSCULAR; INTRAVENOUS at 22:35

## 2017-01-14 RX ADMIN — HEPARIN SODIUM 5000 UNITS: 5000 INJECTION, SOLUTION INTRAVENOUS; SUBCUTANEOUS at 22:40

## 2017-01-14 RX ADMIN — Medication 325 MG: at 10:23

## 2017-01-14 RX ADMIN — LACTOBACILLUS TAB 2 TABLET: TAB at 17:37

## 2017-01-14 RX ADMIN — CARVEDILOL 25 MG: 25 TABLET, FILM COATED ORAL at 10:22

## 2017-01-14 RX ADMIN — ATORVASTATIN CALCIUM 20 MG: 20 TABLET, FILM COATED ORAL at 22:34

## 2017-01-14 RX ADMIN — ASPIRIN 81 MG 81 MG: 81 TABLET ORAL at 10:22

## 2017-01-14 NOTE — ED NOTES
Hourly rounding complete. Safety  Pt resting   [ * ]  On stretcher with side rails up and bed in locked position, call bell within reach  [  ]  Sitting in chair with casters locked, call bell within reach    Toileting  [  ] pt denies need to use bathroom  [  ] pt assisted to bathroom  [  ] pt assisted with bedpan  [  ] pt independent to bathroom as needed    Ongoing Plan of Care  Plan of care and expected time for test and results reviewed with pt.     Pain Management / Comfort  [  ] dimmed lights  [  ] warm blanket provided  [ * ] pain assessed  [ * ] monitor alarms reviewed

## 2017-01-14 NOTE — H&P
[facility] Admission History & Physical    PCP: Dr. Kathi Seay  DOS: 1/13/2017   CC: Shortness of breath    HPI:  Mario Alberto Love is a 80 y.o. AAF w/ PMH sig for chronic combined CHF, CKD-4, HTN, hypothyroidism, anemia, arthritis who presented w/ dyspnea, and is being admitted for CHF exacerbation, pneumonia. Pt's son and daughter-in-law at bedside to assist in HPI. Pt states has been in relatively good health, at baseline has REED, abd pain, myalgias and arthralgias; about one week ago developed acute worsening of these chronic conditions, most notably progressively worsening SOB. Mrs. Yanna Dewitt noticed a non-productive cough yesterday that worsened today. She endorses substernal chest pains, occasional palpitations; she was not able to remark on onset of these symptoms. She had also developed nausea with associated diaphoresis/flushing yesterday, and has been retching without emesis today. She denies changes to her bowel habits, no persistent diarrhea or constipation. She has not had PO intake today, no meds, but has good appetite and requests trying for some clear liquid diet at this time. ER course: Started on Zithromax, Rocephin. S/p Lasix 40mg IV once. Treated w/ phernergan and morphine. ROS:  Const: +Malaise, fatigue, generalized weakness. No fever/chills. HEENT: No head trauma, vision abnormalities, eye pain, rhinorrhea, sore throat, face/sinus pain, ear pain. Allergy/Imm: +Medication allergies. No seasonal allergies, immunodeficiency. CV: +Chest pain, palpitations, LE edema, lightheadedness. No syncope. Resp:+SOB, REED, cough. : No incontinence, dysuria, hematuria, malodorous urine. GI: +Abdominal pain, nausea. No emesis, diarrhea, constipation, blood in stools, melena. Endo: No polydipsia/uria, no temp intolerance. Heme: No easy bruising/bleeding. Integ: No pruritus, rashes, jaundice. Neuro: No headache, weakness or paresthesia extremities.   MSK: +Arthritis, myalgias. Psych: +Depression or anxiety. Medications:  Prior to Admission Medications   Prescriptions Last Dose Informant Patient Reported? Taking? BUMETANIDE (BUMEX PO)   Yes No   Sig: Take 1 mg by mouth as directed. Take one tablet daily and as directed if weight up 2+ pounds    CRANBERRY EXT/C/L. SPOROGENES (CRANBERRY-PROBIOTICS-VITAMIN C PO)   Yes No   Sig: Take 1,500 mg by mouth daily. LACTOBACILLUS ACIDOPHILUS (PROBIOTIC PO)   Yes No   Sig: Take  by mouth two (2) times a day. 2 tabs bid   aspirin 81 mg tablet   Yes No   Sig: Take 81 mg by mouth daily. calcium citrate-vitamin d3 (CITRACAL + D) 315-200 mg-unit tab   Yes No   Sig: Take 1 tablet by mouth daily (with breakfast). carvedilol (COREG) 25 mg tablet   No No   Sig: Take 1 Tab by mouth two (2) times daily (with meals). ferrous sulfate (IRON) 325 mg (65 mg iron) tablet   Yes No   Sig: Take  by mouth Daily (before breakfast). flaxseed oil 1,000 mg cap   Yes No   Sig: Take  by mouth two (2) times a day. fluticasone (FLONASE) 50 mcg/actuation nasal spray   Yes No   Sig: nightly. levothyroxine (LEVOXYL) 50 mcg tablet   Yes No   Sig: Take 50 mcg by mouth daily (before breakfast). nitroglycerin (NITROSTAT) 0.4 mg SL tablet   Yes No   Si.4 mg by SubLINGual route every five (5) minutes as needed. omeprazole (PRILOSEC) 20 mg capsule   Yes No   Sig: Take 20 mg by mouth daily. potassium chloride (K-DUR) 20 mEq tablet   Yes No   Sig: Take 20 mEq by mouth. Take 1 tablet in the morning and 0.5 tablet in the evening      Facility-Administered Medications: None       Allergies:   Allergies   Allergen Reactions    Ace Inhibitors Swelling     wheezing    Ciprofloxacin Nausea Only     Dizziness, nausea    Codeine Nausea Only    Cozaar [Losartan] Swelling     Tongue edema and generalized swelling    Darvocet A500 [Propoxyphene N-Acetaminophen] Other (comments)     Dizziness    Gabapentin Swelling     (Neurontin)    Lisinopril Swelling Throat swells      Magox [Magnesium Oxide] Diarrhea    Norvasc [Amlodipine] Swelling     Tongue edema and generalized swelling    Smz-Tmp Ds [Sulfamethoxazole-Trimethoprim] Swelling     Throat swells        Medical hx:  Past Medical History   Diagnosis Date    Actinic keratoses     Angioedema 2003     and respiratory failure from reaction to lisinopril    Atypical chest pain     Cardiac cath 02/06/2009     mRCA 100% (overlapping 2.75 x 28 & 2.75 x 12 Vision BMS). LM patent. mLAD 80%. CX 25%. OM1 100%. LVEDP 28.  EF 45%. Inferobasal akn. MR 3+. Left RA 50%. RA 7.  RV 40/4. PA 37/13. W 18.  CO/CI 5.0/3.2 (TD); 4.0 x 2.6 (Dick).  Cardiac echocardiogram 02/09/2010     EF 15-20%. Gr 2 DFX. Severe, diffuse hykn. Mod MR. LAE. Mod PI. PASP 40. Mild TR.  Cardiac nuclear imaging test, abnormal 09/27/2010     Mod basal inferior, early mid inferior infarction w/o ischemia. Mild mid anterior artifact vs infarction w/o ischemia. Massive LVE. EF 18%. Marked septal, inferior hypk. Inferoapical, inferoseptal dysk. Anterior hypk. RVE suggested.  Cardiovascular ankle brachial index 03/28/2014     No arterial disease at rest bilaterally. R АНДРЕЙ 1.02.  L АНДРЕЙ 1.05.  R DBI 0.48. L DBI 0.64. Exercise deferred.  Cardiovascular lower extremity venous duplex 03/03/2011     No DVT bilaterally. Bilateral puslatile flow c/w increased central venous pressures. Biphasic signals in bilateral posterior tibial arteries. Bilateral lower leg edema.  Carotid duplex 03/28/2014     Mild <50% bilateral ICA plaquing.       Coronary artery disease      Most likely myocardial infarction 12-08; stenting of the RCA and LIMA to LAD with bypass surgery later that year    Dyslipidemia     GERD (gastroesophageal reflux disease)     Heart failure 6/09,2/10,11/10     readmission to hospital 6/09, 2/10, 11/10    Hypertension     Hypothyroidism     Incomplete bundle branch block      left    Intraventricular conduction delay      with QRS duration of 120 msec with incomplete left BBB    Irritable bowel syndrome     Ischemic cardiomyopathy      EF now in the 15% to 20% range, last assessed September 2010 by nuclear stress test.   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  Thrombocytopenia ([address]         Surgical hx:  Past Surgical History   Procedure Laterality Date    Hx hysterectomy      Hx bladder repair      Hx hemorrhoidectomy      Hx heart catheterization  02/09     with stenting of the total occluded right coronary artery    Hx coronary stent placement  2/6/09     totally occluded right coronary artery was stented successfully with 2.75 mm x 28 mm Vision stent overlapping with the 12 mm length Vision stent    Hx pacemaker  10/13/10     dual-chamber Medtronic AICD without left ventricular lead    Hx heart valve surgery       1. Mitral valve repair with size 30 CE Physio ring. 2. Single LIMA to the LAD        Social Hx:  Social History     Social History    Marital status:      Spouse name: N/A    Number of children: N/A    Years of education: N/A     Occupational History    Not on file.      Social History Main Topics    Smoking status: Never Smoker    Smokeless tobacco: Never Used    Alcohol use No    Drug use: No    Sexual activity: Not on file     Other Topics Concern    Not on file     Social History Narrative       Family hx:  Family History   Problem Relation Age of Onset    Hypertension Sister          Objective:  Patient Vitals for the past 24 hrs:   Temp Pulse Resp BP SpO2   01/13/17 1945 - 76 24 168/85 99 %   01/13/17 1930 - 77 23 (!) 168/128 98 %   01/13/17 1900 - 76 21 167/83 100 %   01/13/17 1845 - 76 24 165/82 100 %   01/13/17 1830 - 75 21 164/73 99 %   01/13/17 1815 - 87 (!) 43 161/82 98 %   01/13/17 1800 - 84 28 163/83 98 %   01/13/17 1745 - 75 23 166/75 100 %   01/13/17 1730 - 80 20 172/84 98 %   01/13/17 1715 - 83 29 154/86 98 %   01/13/17 1700 - 77 24 164/71 100 %   01/13/17 1645 - 79 25 157/83 100 %   01/13/17 1630 - 76 23 154/72 100 %   01/13/17 1615 - 74 24 141/69 100 %   01/13/17 1600 - 75 19 130/68 100 %   01/13/17 1530 - 82 - 138/77 100 %   01/13/17 1445 - 89 (!) 37 177/89 96 %   01/13/17 1430 - 90 25 172/83 95 %   01/13/17 1330 - 91 27 (!) 170/91 100 %   01/13/17 1315 - 89 29 (!) 167/91 100 %   01/13/17 1307 - 88 - - -   01/13/17 1230 - 82 (!) 34 (!) 179/96 100 %   01/13/17 1215 - 84 26 181/81 100 %   01/13/17 1115 - 79 27 (!) 163/94 94 %   01/13/17 1030 - 82 22 171/81 -   01/13/17 0826 97.9 °F (36.6 °C) 76 (!) 36 172/83 95 %         Physical Exam:   Gen: Thin, frail elderly lady in mild distress, fatigued, nontoxic. Head/ears: NCAT, TM w/out erythema or bulging bilat, no otorrhea. Eyes/nose: PERRL. No nasal polyps. Mouth/throat: MMM. Neck: Supple. No supraclavicular or cervical LAD. No thyromegaly. Cardiac: RRR, 1/5 VENECIA, gallop. Pulm: Mild tachypnea and some use of accessory muscles, good air movement, moderate coarse rales in bases R>L, no wheezes or rhonchi. Abdomen: Mildly distended, NABS, tympanic, soft, non-tender. Ext: Radial/DP pulses intact bilat, trace to 1+ edema bilat R>L. Skin: Warm/dry. No bruising/rashes. Neuro: A&Ox3. CN II-XII grossly intact, no focal deficits, speech clear and appropriate. Psych: Depressed affect.        Labs:  Recent Results (from the past 12 hour(s))   EKG, 12 LEAD, INITIAL    Collection Time: 01/13/17 11:16 AM   Result Value Ref Range    Ventricular Rate 81 BPM Atrial Rate 81 BPM    P-R Interval 172 ms    QRS Duration 110 ms    Q-T Interval 424 ms    QTC Calculation (Bezet) 492 ms    Calculated P Axis 55 degrees    Calculated R Axis 111 degrees    Calculated T Axis 156 degrees    Diagnosis       Normal sinus rhythm  Right axis deviation  Low voltage QRS  Cannot rule out Anterior infarct (cited on or before 13-JAN-2017)  ST & T wave abnormality, consider lateral ischemia  Abnormal ECG  When compared with ECG of 13-JAN-2017 08:18,  QRS axis shifted right  Serial changes of Anterior infarct present     CBC WITH AUTOMATED DIFF    Collection Time: 01/13/17 11:35 AM   Result Value Ref Range    WBC 4.8 4.6 - 13.2 K/uL    RBC 3.77 (L) 4.20 - 5.30 M/uL    HGB 11.6 (L) 12.0 - 16.0 g/dL    HCT 35.3 35.0 - 45.0 %    MCV 93.6 74.0 - 97.0 FL    MCH 30.8 24.0 - 34.0 PG    MCHC 32.9 31.0 - 37.0 g/dL    RDW 12.5 11.6 - 14.5 %    PLATELET 860 669 - 536 K/uL    MPV 9.7 9.2 - 11.8 FL    NEUTROPHILS 66 40 - 73 %    LYMPHOCYTES 20 (L) 21 - 52 %    MONOCYTES 11 (H) 3 - 10 %    EOSINOPHILS 2 0 - 5 %    BASOPHILS 1 0 - 2 %    ABS. NEUTROPHILS 3.2 1.8 - 8.0 K/UL    ABS. LYMPHOCYTES 1.0 0.9 - 3.6 K/UL    ABS. MONOCYTES 0.5 0.05 - 1.2 K/UL    ABS. EOSINOPHILS 0.1 0.0 - 0.4 K/UL    ABS. BASOPHILS 0.0 0.0 - 0.1 K/UL    DF AUTOMATED     METABOLIC PANEL, COMPREHENSIVE    Collection Time: 01/13/17 11:35 AM   Result Value Ref Range    Sodium 141 136 - 145 mmol/L    Potassium 4.9 3.5 - 5.5 mmol/L    Chloride 104 100 - 108 mmol/L    CO2 28 21 - 32 mmol/L    Anion gap 9 3.0 - 18 mmol/L    Glucose 114 (H) 74 - 99 mg/dL    BUN 21 (H) 7.0 - 18 MG/DL    Creatinine 1.39 (H) 0.6 - 1.3 MG/DL    BUN/Creatinine ratio 15 12 - 20      GFR est AA 43 (L) >60 ml/min/1.73m2    GFR est non-AA 36 (L) >60 ml/min/1.73m2    Calcium 10.0 8.5 - 10.1 MG/DL    Bilirubin, total 0.7 0.2 - 1.0 MG/DL    ALT 37 13 - 56 U/L    AST 36 15 - 37 U/L    Alk.  phosphatase 21 (L) 45 - 117 U/L    Protein, total 7.1 6.4 - 8.2 g/dL    Albumin 3.7 3.4 - 5.0 g/dL    Globulin 3.4 2.0 - 4.0 g/dL    A-G Ratio 1.1 0.8 - 1.7     CARDIAC PANEL,(CK, CKMB & TROPONIN)    Collection Time: 01/13/17 11:35 AM   Result Value Ref Range    CK 83 26 - 192 U/L    CK - MB 1.9 0.5 - 3.6 ng/ml    CK-MB Index 2.3 0.0 - 4.0 %    Troponin-I, Qt. 0.03 0.0 - 0.045 NG/ML   PRO-BNP    Collection Time: 01/13/17 11:35 AM   Result Value Ref Range    NT pro-BNP >495900 (H) 0 - 1800 PG/ML   TSH 3RD GENERATION    Collection Time: 01/13/17 11:35 AM   Result Value Ref Range    TSH 2.53 0.36 - 3.74 uIU/mL   URINALYSIS W/ RFLX MICROSCOPIC    Collection Time: 01/13/17  2:30 PM   Result Value Ref Range    Color YELLOW      Appearance CLEAR      Specific gravity 1.018 1.005 - 1.030      pH (UA) 8.0 5.0 - 8.0      Protein 100 (A) NEG mg/dL    Glucose NEGATIVE  NEG mg/dL    Ketone TRACE (A) NEG mg/dL    Bilirubin NEGATIVE  NEG      Blood NEGATIVE  NEG      Urobilinogen 0.2 0.2 - 1.0 EU/dL    Nitrites NEGATIVE  NEG      Leukocyte Esterase TRACE (A) NEG     URINE MICROSCOPIC ONLY    Collection Time: 01/13/17  2:30 PM   Result Value Ref Range    WBC 3 to 5 0 - 4 /hpf    RBC NEGATIVE  0 - 5 /hpf    Epithelial cells NEGATIVE  0 - 5 /lpf    Bacteria 1+ (A) NEG /hpf    Hyaline Cast FEW 0 - 2 /lpf   EKG, 12 LEAD, SUBSEQUENT    Collection Time: 01/13/17  3:56 PM   Result Value Ref Range    Ventricular Rate 77 BPM    Atrial Rate 77 BPM    P-R Interval 192 ms    QRS Duration 116 ms    Q-T Interval 468 ms    QTC Calculation (Bezet) 529 ms    Calculated P Axis 72 degrees    Calculated R Axis 20 degrees    Calculated T Axis 155 degrees    Diagnosis       Sinus rhythm with premature atrial complexes  Low voltage QRS  Cannot rule out Anterior infarct (cited on or before 13-JAN-2017)  ST & T wave abnormality, consider lateral ischemia  Prolonged QT  Abnormal ECG  When compared with ECG of 13-JAN-2017 11:16,  premature atrial complexes are now present  QRS axis shifted left  Serial changes of Anterior infarct present TROPONIN I    Collection Time: 17  4:12 PM   Result Value Ref Range    Troponin-I, Qt. 0.04 0.0 - 0.045 NG/ML   INFLUENZA A & B AG (RAPID TEST)    Collection Time: 17  4:24 PM   Result Value Ref Range    Influenza A Antigen NEGATIVE  NEG      Influenza B Antigen NEGATIVE  NEG           Microbiology:   17 blood cx, urine cx pending. Imagin17 CT chest:  Small to moderate right pleural effusion. Moderate atelectatic changes, and/or infiltrates in right lower lobe. Mild patchy infiltrates in right middle lobe and base of right upper lobe. Small left pleural effusion. Mild atelectatic changes, and/or infiltrates in left lower lobe and lingula of left lung. Moderate to marked cardiomegaly. No obvious pericardial effusion.   Mild nonspecific lymphadenopathy in the right paratracheal area. 17 CXR:  Mild to moderate cardiomegaly with cardiac pacer/AICD in position.   No evidence of CHF. Moderate atelectatic changes, and/or infiltrates in left lung base with left basilar pleural effusion. Mild atelectatic changes, and/or infiltrates at right lung base with small right basilar pleural effusion. Mild COPD. Assessment/Plan:  Lyubov Riley is a 80 y.o. AAF w/ PMH sig for chronic combined CHF, CKD-4, HTN, hypothyroidism, anemia, arthritis who presented w/ dyspnea, and is being admitted for CHF exacerbation, pneumonia. Acute exacerbation of chronic combined CHF:   ECHO EF 15-20% w/ G2DD, peak pressure was 40; repeat. Volume overload suggested by exam, proBNP >175,000, CT w/ pleural effusions. Cardiac enzymes WNL x2, EKG non-acute. Rapid flu negative. Tele monitor. Hold home bumex, lasix 20mg IV BID, watch renal indices and electrolytes. Continue home BB. ACE- allergy, intolerance to cozaar. May consider spironolactone. Daily weights. Strict I&Os. ICS. Consult to Cardiology in the morning. Hx intermittent Vtach:  As above.     Pulmonary infiltrates on CT:  1/4 SIRS  Start Ceftriaxone and Doxy. Hx of allergy to cipro, sulfa. Follow 1/13/17 blood cultures. UA w/ some LE, send for cx. Nausea:  Scopolamine patch. LE edema:  Somewhat unequal R>L, check PVL. CKD-4:  Cr 1.39 (baseline ~1.6)  Monitor renal fx, volume status. Prolonged QTC:  1/13/17   Cautious use of antiemetics, abx. Acute on chronic pain:  No warm/swollen joints on exam.  CK WNL. Hx of codeine and tylenol allergy. Hypothyroidism:  TSH WNL, continue current home dose levothyroxine. Normocytic anemia:  Hgb 11.4; trend. Iron studies. Continue home iron supps. Hemoccult stools. Ketonuria:  Likely 2/2 starvation. Per pt preference, clears diet, advance as tolerated. Depressed mood:  Consider PHQ-9. Moderate protein calorie malnutrition:  Diffuse atrophy, poor PO intake, albumin 3.7  Renal supps. Nutrition consult. Global:  Advance to dysphagia diet. OOB w/ assistance, fall precautions, PT/OT. SQH for DVT ppx. PPI for GI ulcer ppx. DNR. Emergency contact: Kavin Liner (daughter-in-law) [phone] (c), [phone] (h).       Sally Saba MD PGY-3  [facility] [address]

## 2017-01-14 NOTE — ED NOTES
Hourly rounding complete. Safety  Pt resting   [x  ]  On stretcher with side rails up and bed in locked position, call bell within reach  [  ]  Sitting in chair with casters locked, call bell within reach    Toileting  [  ] pt denies need to use bathroom  [  ] pt assisted to bathroom  [x  ] pt assisted with bedpan  [  ] pt independent to bathroom as needed    Ongoing Plan of Care  Plan of care and expected time for test and results reviewed with pt.     Pain Management / Comfort  [  ] dimmed lights  [  ] warm blanket provided  [  ] pain assessed  [ x] monitor alarms reviewed

## 2017-01-14 NOTE — ED NOTES
Hourly rounding complete. Safety  Pt resting   [ * ]  On stretcher with side rails up and bed in locked position, call bell within reach  [  ]  Sitting in chair with casters locked, call bell within reach    Toileting  [  ] pt denies need to use bathroom  [  ] pt assisted to bathroom  [  ] pt assisted with bedpan  [  ] pt independent to bathroom as needed    Ongoing Plan of Care  Plan of care and expected time for test and results reviewed with pt.     Pain Management / Comfort  [  ] dimmed lights  [ * ] warm blanket provided  [ * ] pain assessed  [ * ] monitor alarms reviewed

## 2017-01-14 NOTE — CONSULTS
[facility] - Consult Note    Consultation request by Oziel Downey MD for advice/opinion related to evaluating Respiratory distress    Date of  Admission: 1/13/2017 10:21 AM   Primary Care Physician:  Kylah Berry MD     Assessment:     1. Acute on chronic systolic heart failure. Patient with worsening symptoms for the past week. She has chronic NYHA class III symptoms. She states she has not been taking her Bumex regularly as instructed by her nephrologist.  2.  Ischemic cardiomyopathy. Ejection fraction historically in the 20-25% range. This has not been reevaluated in over 5 years by noninvasive imaging. 3.  Mitral valve regurgitation, status post annuloplasty ring. No pronounced murmur on exam today. 4.  Coronary disease. History of LIMA to LAD, and bare-metal stenting to her RCA. Symptoms do not sound suspicious for angina. Her cardiac markers are unremarkable. No new EKG changes. 5.  Status post dual-chamber ICD. Unsuccessful placement of LV lead due to coronary  sinus anatomy. 6.  Hypertension. Patient's blood pressure appears to be mildly elevated in the hospital  thus far. She is currently taking a beta-blocker. 7.  Chronic kidney disease, stage III. This appears stable. She follows up with her nephrologist.  8.  History of angioedema while taking lisinopril. 9.  Dyslipidemia. Patient's LDL was greater than 100 on a recent lipid panel. For some reason she is not taking a statin. Plan: We will change diuretics to IV Bumex 1 mg every 12 hours, while monitoring renal function, daily weights and I's and O's very closely. Long-acting nitrates to help with blood pressure control and pulmonary vascular congestion. If additional  blood pressure agents are needed, I would add hydralazine next. Continue current dosage of her carvedilol. We will start on atorvastatin 20 mg daily.   Echocardiogram tomorrow to reevaluate her LV function and mitral valve regurgitation  Patient is not a candidate for an ACE inhibitor or an ARB because of her history of angioedema. Will arrange for ICD interrogation on Monday. History of Present Illness: This is a 80 y.o. female admitted for Respiratory distress. Patient presents to the hospital complaining of a one-week history of worsening shortness of breath. She also has been complaining of increased orthopnea and leg swelling. She denies any chest pain or pressure. No palpitations, no dizziness, no syncope, no ICD shocks. Patient reports that her nephrologist instructed her to decrease her Bumex regimen to every other day. She states she did not like to take this anyway because it made her urinate too frequently and she was always in the bathroom. She states she has been taking the  remainder of her medications as prescribed. She has been compliant with a low-sodium diet. In the emergency department, she was found to have evidence of decompensated heart failure by chest x-ray and laboratory analysis. Review of Symptoms:  Except as stated above include:  Constitutional:  negative  Respiratory:  negative  Cardiovascular:  negative  Gastrointestinal: negative  Genitourinary:  negative  Musculoskeletal:  Negative  Neurological:  Negative  Dermatological:  Negative  Endocrinological: Negative  Psychological:  Negative    A comprehensive review of systems was negative except for that written in the HPI. Past Medical History:     Past Medical History   Diagnosis Date    Actinic keratoses     Angioedema 2003     and respiratory failure from reaction to lisinopril    Atypical chest pain     Cardiac cath 02/06/2009     mRCA 100% (overlapping 2.75 x 28 & 2.75 x 12 Vision BMS). LM patent. mLAD 80%. CX 25%. OM1 100%. LVEDP 28.  EF 45%. Inferobasal akn. MR 3+. Left RA 50%. RA 7.  RV 40/4. PA 37/13. W 18.  CO/CI 5.0/3.2 (TD); 4.0 x 2.6 (Dick).  Cardiac echocardiogram 02/09/2010     EF 15-20%.   Gr 2 DFX. Severe, diffuse hykn. Mod MR. LAE. Mod PI. PASP 40. Mild TR.  Cardiac nuclear imaging test, abnormal 09/27/2010     Mod basal inferior, early mid inferior infarction w/o ischemia. Mild mid anterior artifact vs infarction w/o ischemia. Massive LVE. EF 18%. Marked septal, inferior hypk. Inferoapical, inferoseptal dysk. Anterior hypk. RVE suggested.  Cardiovascular ankle brachial index 03/28/2014     No arterial disease at rest bilaterally. R АНДРЕЙ 1.02.  L АНДРЕЙ 1.05.  R DBI 0.48. L DBI 0.64. Exercise deferred.  Cardiovascular lower extremity venous duplex 03/03/2011     No DVT bilaterally. Bilateral puslatile flow c/w increased central venous pressures. Biphasic signals in bilateral posterior tibial arteries. Bilateral lower leg edema.  Carotid duplex 03/28/2014     Mild <50% bilateral ICA plaquing.       Coronary artery disease      Most likely myocardial infarction 12-08; stenting of the RCA and LIMA to LAD with bypass surgery later that year    Dyslipidemia     GERD (gastroesophageal reflux disease)     Heart failure 6/09,2/10,11/10     readmission to hospital 6/09, 2/10, 11/10    Hypertension     Hypothyroidism     Incomplete bundle branch block      left    Intraventricular conduction delay      with QRS duration of 120 msec with incomplete left BBB    Irritable bowel syndrome     Ischemic cardiomyopathy      EF now in the 15% to 20% range, last assessed September 2010 by nuclear stress test.    Mitral regurgitation      moderately severe, s/p mitral valve repair in 05/2009    Osteoarthritis of knee     Pacemaker 10/10     dual-chamber ICD placed, with LV lead placement with current RV pacing worsening mitral regurgitation    Pulmonary hypertension (HCC)      moderate    Renal artery stenosis (HCC)     Respiratory failure ([address] 2003    Rheumatoid [facility](767.0)     Systolic CHF, chronic (HCC)      class III to IV, with multiple recurrent admissions in the past year.  Thrombocytopenia ([address]          Social History:     Social History     Social History    Marital status:      Spouse name: N/A    Number of children: N/A    Years of education: N/A     Social History Main Topics    Smoking status: Never Smoker    Smokeless tobacco: Never Used    Alcohol use No    Drug use: No    Sexual activity: Not on file     Other Topics Concern    Not on file     Social History Narrative        Family History:     Family History   Problem Relation Age of Onset    Hypertension Sister         Medications:      Allergies   Allergen Reactions    Ace Inhibitors Swelling     wheezing    Ciprofloxacin Nausea Only     Dizziness, nausea    Codeine Nausea Only    Cozaar [Losartan] Swelling     Tongue edema and generalized swelling    Darvocet A500 [Propoxyphene N-Acetaminophen] Other (comments)     Dizziness    Gabapentin Swelling     (Neurontin)    Lisinopril Swelling     Throat swells      Magox [Magnesium Oxide] Diarrhea    Norvasc [Amlodipine] Swelling     Tongue edema and generalized swelling    Smz-Tmp Ds [Sulfamethoxazole-Trimethoprim] Swelling     Throat swells        Current Facility-Administered Medications   Medication Dose Route Frequency    [START ON 1/15/2017] therapeutic multivitamin (THERAGRAN) tablet 1 Tab  1 Tab Oral DAILY    bumetanide (BUMEX) injection 1 mg  1 mg IntraVENous Q12H    isosorbide mononitrate ER (IMDUR) tablet 30 mg  30 mg Oral DAILY    aspirin chewable tablet 81 mg  81 mg Oral DAILY    levothyroxine (SYNTHROID) tablet 50 mcg  50 mcg Oral ACB    ferrous sulfate tablet 325 mg  1 Tab Oral BID WITH MEALS    Lactobacillus Acidoph & Bulgar (FLORANEX) tablet 2 Tab  2 Tab Oral BID    potassium chloride (K-DUR, KLOR-CON) SR tablet 20 mEq  20 mEq Oral DAILY    heparin (porcine) injection 5,000 Units  5,000 Units SubCUTAneous Q8H    scopolamine (TRANSDERM-SCOP) 1.5 mg  1.5 mg TransDERmal Q72H    carvedilol (COREG) tablet 25 mg  25 mg Oral BID WITH MEALS    cefTRIAXone (ROCEPHIN) 1 g in 0.9% sodium chloride (MBP/ADV) 50 mL MBP  1 g IntraVENous Q24H    doxycycline (VIBRAMYCIN) 100 mg in 0.9% sodium chloride (MBP/ADV) 100 mL IVPB  100 mg IntraVENous Q12H     Current Outpatient Prescriptions   Medication Sig    carvedilol (COREG) 25 mg tablet Take 1 Tab by mouth two (2) times daily (with meals).  fluticasone (FLONASE) 50 mcg/actuation nasal spray nightly.  omeprazole (PRILOSEC) 20 mg capsule Take 20 mg by mouth daily.  ferrous sulfate (IRON) 325 mg (65 mg iron) tablet Take  by mouth Daily (before breakfast).  calcium citrate-vitamin d3 (CITRACAL + D) 315-200 mg-unit tab Take 1 tablet by mouth daily (with breakfast).  flaxseed oil 1,000 mg cap Take  by mouth two (2) times a day.  LACTOBACILLUS ACIDOPHILUS (PROBIOTIC PO) Take  by mouth two (2) times a day. 2 tabs bid    BUMETANIDE (BUMEX PO) Take 1 mg by mouth as directed. Take one tablet daily and as directed if weight up 2+ pounds     potassium chloride (K-DUR) 20 mEq tablet Take 20 mEq by mouth. Take 1 tablet in the morning and 0.5 tablet in the evening    CRANBERRY EXT/C/L. SPOROGENES (CRANBERRY-PROBIOTICS-VITAMIN C PO) Take 1,500 mg by mouth daily.  aspirin 81 mg tablet Take 81 mg by mouth daily.  levothyroxine (LEVOXYL) 50 mcg tablet Take 50 mcg by mouth daily (before breakfast).  nitroglycerin (NITROSTAT) 0.4 mg SL tablet 0.4 mg by SubLINGual route every five (5) minutes as needed.          Physical Exam:     Visit Vitals    /84    Pulse 62    Temp 97.9 °F (36.6 °C)    Resp 21    Ht 5' 1\" (1.549 m)    Wt 54 kg (119 lb)    SpO2 100%    BMI 22.48 kg/m2     BP Readings from Last 3 Encounters:   01/14/17 144/84   11/28/16 160/68   07/12/16 121/60     Pulse Readings from Last 3 Encounters:   01/14/17 62   11/28/16 66   05/09/16 63     Wt Readings from Last 3 Encounters:   01/13/17 54 kg (119 lb)   11/28/16 54 kg (119 lb)   11/09/16 55.3 kg (122 lb) General:  alert, cooperative, no distress, appears stated age  Neck:  +JVD  Lungs:  rales R base, L base  Heart:  regular rate and rhythm, no murmur, no S3  Abdomen:  abdomen is soft without significant tenderness, masses, organomegaly or guarding  Extremities:  edema trace to 1+ lower extremity bilateral  Skin: Warm and dry.  no hyperpigmentation, vitiligo, or suspicious lesions  Neuro: alert, oriented x3, affect appropriate, no focal neurological deficits, moves all extremities well, no involuntary movements, reflexes at knee and ankle intact  Psych: non focal     Data Review:     Recent Labs      01/14/17   0345  01/13/17   1135   WBC  4.9  4.8   HGB  10.3*  11.6*   HCT  30.6*  35.3   PLT  130*  156     Recent Labs      01/14/17   0345  01/13/17   1135   NA  142  141   K  3.7  4.9   CL  102  104   CO2  30  28   GLU  143*  114*   BUN  24*  21*   CREA  1.52*  1.39*   CA  9.0  10.0   MG  1.8   --    ALB   --   3.7   SGOT   --   36   ALT   --   37       Results for orders placed or performed during the hospital encounter of 01/13/17   EKG, 12 LEAD, INITIAL   Result Value Ref Range    Ventricular Rate 81 BPM    Atrial Rate 81 BPM    P-R Interval 172 ms    QRS Duration 110 ms    Q-T Interval 424 ms    QTC Calculation (Bezet) 492 ms    Calculated P Axis 55 degrees    Calculated R Axis 111 degrees    Calculated T Axis 156 degrees    Diagnosis       Normal sinus rhythm  Right axis deviation  Low voltage QRS  Nonspecific intraventricular conduction delay  Poor R Wave Progression  ST & T wave abnormality, consider lateral ischemia  Prolonged QT  Abnormal ECG  When compared with ECG of 13-JAN-2017 08:18,  QRS axis shifted right  deeper ST depression in lead V6 probably due to lead placement variation  Reconfirmed by Otto Rangel MD [id number]) on 1/14/2017 11:13:54 AM     Results for orders placed or performed in visit on 11/28/16   AMB POC EKG ROUTINE W/ 12 LEADS, INTER & REP    Narrative    EKG: nonspecific ST and T waves changes, occasional PVC noted, unifocal, atrially paced,LV strain/LVH   Results for orders placed or performed in visit on 11/18/14   PACEMAKER CHECK    Impression    A-paced - 76%; V-sensed - 100%; Lead impedances and threshold  WNL;  No events       All Cardiac Markers in the last 24 hours:    Lab Results   Component Value Date/Time    CPK 80 01/14/2017 03:45 AM    CKMB 2.5 01/14/2017 03:45 AM    CKND1 3.1 01/14/2017 03:45 AM    TROIQ 0.08 (H) 01/14/2017 03:45 AM       Last Lipid:    Lab Results   Component Value Date/Time    Cholesterol, total 183 12/07/2016 04:31 PM    HDL Cholesterol 67 12/07/2016 04:31 PM    LDL,Direct 66 07/29/2010 12:18 PM    LDL, calculated 106 12/07/2016 04:31 PM    Triglyceride 50 12/07/2016 04:31 PM    CHOL/HDL Ratio 2.7 12/07/2016 04:31 PM       Signed By: Cuauhtemoc Ludwig MD     January 14, 2017

## 2017-01-14 NOTE — ED NOTES
Patient's bed linens changed, patient received bed bath, gown changed and non-skid socks givens. Patient's lunch at bedside. Will continue to monitor.

## 2017-01-14 NOTE — ED NOTES
The patient is sitting up to eat lunch. The patient requested that I wait to hook her up to Doxy until she is done with lunch. Will continue to monitor.

## 2017-01-14 NOTE — PROGRESS NOTES
NUTRITION    Nutrition Consult: General Nutrition Management & Supplements     RECOMMENDATIONS / PLAN:     - Advance diet as tolerated. Recommend cardiac soft solid diet. - Discontinue Matt and Magic Cup and add Ensure Clear TID. - Add daily multivitamin.   - Continue RD inpatient monitoring and evaluation. NUTRITION INTERVENTIONS & DIAGNOSIS:     [x] Meals/Snacks: clear liquids   [x] Medical food supplementation: Magic Cup TID, Matt TID- modify     [x] Vitamin and mineral supplementation: ferrous sulfate, 20 mEq KCl- add MVI     Nutrition Diagnosis: Unintentional weight loss related to inadequate energy intake as evidenced by 11 lb, 8% weight loss x 6-9 months PTA. ASSESSMENT:     Subjective: Decreased appetite with nausea/vomiting. Ate most of clear liquid lunch, 100% of broth. Limits cream and dairy intake, denies lactose intolerance. Agreeable to supplements. Current Appetite:   [] Good     [] Fair     [x] Poor     [] Other:  Appetite/meal intake prior to admission:   [] Good     [] Fair     [x] Poor for several months     [] Other:    Diet: DIET CLEAR LIQUID  DIET NUTRITIONAL SUPPLEMENTS  DIET NUTRITIONAL SUPPLEMENTS     Average po intake adequate to meet patients estimated nutritional needs:   [] Yes     [x] No   [] Unable to determine at this time  Current Meal Intake: No data found.      Food Allergies: NKFA  Feeding Limitations:  [] Swallowing difficulty    [x] Chewing difficulty: dentures, worn and old  [x] Other: tolerates soft foods per patient report    BM:  PTA  Skin Integrity: WDL  Edema: none   Pertinent Medications: Reviewed     Labs:  Recent Labs      01/14/17   0345  01/13/17   1135   NA  142  141   K  3.7  4.9   CL  102  104   CO2  30  28   GLU  143*  114*   BUN  24*  21*   CREA  1.52*  1.39*   CA  9.0  10.0   MG  1.8   --    ALB   --   3.7   SGOT   --   36   ALT   --   37       Intake/Output Summary (Last 24 hours) at 01/14/17 1518  Last data filed at 01/14/17 0705   Gross per 24 hour   Intake                0 ml   Output             5090 ml   Net            -5090 ml       Anthropometrics:  Ht Readings from Last 1 Encounters:   01/13/17 5' 1\" (1.549 m)     Last 3 Recorded Weights in this Encounter    01/13/17 0826   Weight: 54 kg (119 lb)     Body mass index is 22.48 kg/(m^2). Weight History: patient reports previous weight of 130 lb and weight loss down to 115 lb over 6-9 months PTA (11 lb, 8% net weight loss)    Weight Metrics 1/13/2017 11/28/2016 11/9/2016 7/12/2016 5/9/2016 11/9/2015 6/15/2015   Weight 119 lb 119 lb 122 lb 122 lb 125 lb 126 lb 127 lb   BMI 22.48 kg/m2 22.48 kg/m2 23.05 kg/m2 23.06 kg/m2 23.63 kg/m2 23.82 kg/m2 24.01 kg/m2        Admitting Diagnosis: Respiratory distress  Past Medical History   Diagnosis Date    Actinic keratoses     Angioedema 2003     and respiratory failure from reaction to lisinopril    Atypical chest pain     Cardiac cath 02/06/2009     mRCA 100% (overlapping 2.75 x 28 & 2.75 x 12 Vision BMS). LM patent. mLAD 80%. CX 25%. OM1 100%. LVEDP 28.  EF 45%. Inferobasal akn. MR 3+. Left RA 50%. RA 7.  RV 40/4. PA 37/13. W 18.  CO/CI 5.0/3.2 (TD); 4.0 x 2.6 (Dick).  Cardiac echocardiogram 02/09/2010     EF 15-20%. Gr 2 DFX. Severe, diffuse hykn. Mod MR. LAE. Mod PI. PASP 40. Mild TR.  Cardiac nuclear imaging test, abnormal 09/27/2010     Mod basal inferior, early mid inferior infarction w/o ischemia. Mild mid anterior artifact vs infarction w/o ischemia. Massive LVE. EF 18%. Marked septal, inferior hypk. Inferoapical, inferoseptal dysk. Anterior hypk. RVE suggested.  Cardiovascular ankle brachial index 03/28/2014     No arterial disease at rest bilaterally. R АНДРЕЙ 1.02.  L АНДРЕЙ 1.05.  R DBI 0.48. L DBI 0.64. Exercise deferred.  Cardiovascular lower extremity venous duplex 03/03/2011     No DVT bilaterally. Bilateral puslatile flow c/w increased central venous pressures.   Biphasic signals in bilateral posterior tibial arteries. Bilateral lower leg edema.  Carotid duplex 03/28/2014     Mild <50% bilateral ICA plaquing.  Coronary artery disease      Most likely myocardial infarction 12-08; stenting of the RCA and LIMA to LAD with bypass surgery later that year    Dyslipidemia     GERD (gastroesophageal reflux disease)     Heart failure 6/09,2/10,11/10     readmission to hospital 6/09, 2/10, 11/10    Hypertension     Hypothyroidism     Incomplete bundle branch block      left    Intraventricular conduction delay      with QRS duration of 120 msec with incomplete left BBB    Irritable bowel syndrome     Ischemic cardiomyopathy      EF now in the 15% to 20% range, last assessed September 2010 by nuclear stress test.    Mitral regurgitation      moderately severe, s/p mitral valve repair in 05/2009    Osteoarthritis of knee     Pacemaker 10/10     dual-chamber ICD placed, with LV lead placement with current RV pacing worsening mitral regurgitation    Pulmonary hypertension (HCC)      moderate    Renal artery stenosis (HCC)     Respiratory failure ([address] 2003    Rheumatoid [id number](106.1)     Systolic CHF, chronic (HCC)      class III to IV, with multiple recurrent admissions in the past year.  Thrombocytopenia ([address]        Education Needs:        [x] None identified  [] Identified - Not appropriate at this time  []  Identified and addressed - refer to education log  Learning Limitations:   [x] None identified  [] Identified    Cultural, Protestant & ethnic food preferences:  [x] None identified    [] Identified and addressed     ESTIMATED NUTRITION NEEDS:     Calories: 3283-0850 kcal (MSJx1.2-1.3) based on  [x] Actual BW: 54 kg     [] IBW:   CHO: 136-147 gm (50% kcal)   Protein: 43-54 gm (0.8-1 gm/kg) based on  [x] Actual BW: 54 kg     [] IBW:   Fluid: 1 mL/kcal     MONITORING & EVALUATION:     Nutrition Goal(s):  1.  Po intake of meals will meet >75% of patient estimated nutritional needs within the next 7 days.   Outcome:  [] Met/Ongoing    []  Not Met    [x] New/Initial Goal     Monitoring:   [x] Monitor meal intake    [] Monitor diet tolerance     [x] Monitor supplement intake    Previous Recommendations (for follow-up assessments only):     []   Implemented       []   Not Implemented (RD to address)     [] No Recommendation Made     Discharge Planning: cardiac diet, consistency as tolerated   [x] Participated in care planning, discharge planning, & interdisciplinary rounds as appropriate      Chase Maria, [address]    Pager: [phone]

## 2017-01-14 NOTE — ED NOTES
Assumed care of patient from [facility]. Pt is on stretcher, in gown, on monitor. Pt is AOX4. Pt has IV access. Pt has inpatient orders and is awaiting room at this time.

## 2017-01-14 NOTE — ED NOTES
Hourly rounding complete. Safety  Pt resting   [x  ]  On stretcher with side rails up and bed in locked position, call bell within reach  [  ]  Sitting in chair with casters locked, call bell within reach    Toileting  [  ] pt denies need to use bathroom  [  ] pt assisted to bathroom  [  ] pt assisted with bedpan  [ x ] pt independent to bathroom as needed    Ongoing Plan of Care  Plan of care and expected time for test and results reviewed with pt.     Pain Management / Comfort  [  ] dimmed lights  [  ] warm blanket provided  [  ] pain assessed  [ x] monitor alarms reviewed

## 2017-01-14 NOTE — ED NOTES
Hourly rounding complete. Safety  Pt resting   [ * ]  On stretcher with side rails up and bed in locked position, call bell within reach  [  ]  Sitting in chair with casters locked, call bell within reach    Toileting  [ * ] pt denies need to use bathroom  [  ] pt assisted to bathroom  [  ] pt assisted with bedpan  [  ] pt independent to bathroom as needed    Ongoing Plan of Care  Plan of care and expected time for test and results reviewed with pt.     Pain Management / Comfort  [  ] dimmed lights  [  ] warm blanket provided  [  ] pain assessed  [ * ] monitor alarms reviewed

## 2017-01-14 NOTE — PROGRESS NOTES
Intern Progress Note  [facility]       Patient: Chaya Morgan MRN: [id number]  CSN: [id number]    YOB: 1928  Age: 80 y.o. Sex: female    DOA: 1/13/2017 LOS:  LOS: 0 days                    Subjective:     Acute events: Ms. Ramu Mccord was resting comfortably on 1L O2 on my arrival. States she feels better than yesterday, continues to endorse SOB.  Denies headache, lightheadedness, chest pain, abdominal pain, vomiting        Objective:      Patient Vitals for the past 24 hrs:   Pulse Resp BP SpO2   01/14/17 0715 63 21 - 98 %   01/14/17 0700 66 18 152/69 97 %   01/14/17 0645 60 18 - 98 %   01/14/17 0630 60 16 159/56 99 %   01/14/17 0615 60 17 - 98 %   01/14/17 0600 60 18 163/61 98 %   01/14/17 0545 62 17 - 98 %   01/14/17 0530 79 (!) 34 152/67 93 %   01/14/17 0515 80 30 - 93 %   01/14/17 0500 65 20 154/73 99 %   01/14/17 0445 60 18 - 98 %   01/14/17 0430 62 18 155/82 96 %   01/14/17 0415 60 17 - 100 %   01/14/17 0400 61 17 161/61 97 %   01/14/17 0345 60 18 - 98 %   01/14/17 0330 60 18 157/58 98 %   01/14/17 0315 62 18 - 99 %   01/14/17 0300 60 16 159/61 98 %   01/14/17 0245 60 18 - 97 %   01/14/17 0230 60 17 160/65 96 %   01/14/17 0215 80 26 - 94 %   01/14/17 0200 79 (!) 31 - 96 %   01/14/17 0145 60 16 - 98 %   01/14/17 0130 60 18 153/55 98 %   01/14/17 0115 60 18 - 97 %   01/14/17 0100 60 17 181/52 98 %   01/14/17 0045 66 20 - 96 %   01/14/17 0030 60 18 144/44 98 %   01/14/17 0015 65 16 - 98 %   01/14/17 0000 68 18 160/63 97 %   01/13/17 2345 77 17 - 96 %   01/13/17 2300 88 20 - 93 %   01/13/17 2245 70 21 162/75 97 %   01/13/17 2230 73 21 167/71 95 %   01/13/17 2215 76 22 170/76 94 %   01/13/17 2200 71 21 169/70 96 %   01/13/17 2145 70 21 168/65 96 %   01/13/17 2130 72 22 169/70 96 %   01/13/17 2115 75 21 162/77 94 %   01/13/17 2045 72 - 176/85 98 %   01/13/17 2030 - - - 100 %   01/13/17 2015 - - 170/80 100 %   01/13/17 2000 - - 170/85 98 %   01/13/17 1945 76 24 168/85 99 %   01/13/17 1930 77 23 (!) 168/128 98 %   01/13/17 1900 76 21 167/83 100 %   01/13/17 1845 76 24 165/82 100 %   01/13/17 1830 75 21 164/73 99 %   01/13/17 1815 87 (!) 43 161/82 98 %   01/13/17 1800 84 28 163/83 98 %   01/13/17 1745 75 23 166/75 100 %   01/13/17 1730 80 20 172/84 98 %   01/13/17 1715 83 29 154/86 98 %   01/13/17 1700 77 24 164/71 100 %   01/13/17 1645 79 25 157/83 100 %   01/13/17 1630 76 23 154/72 100 %   01/13/17 1615 74 24 141/69 100 %   01/13/17 1600 75 19 130/68 100 %   01/13/17 1530 82 - 138/77 100 %   01/13/17 1445 89 (!) 37 177/89 96 %   01/13/17 1430 90 25 172/83 95 %   01/13/17 1330 91 27 (!) 170/91 100 %   01/13/17 1315 89 29 (!) 167/91 100 %   01/13/17 1307 88 - - -   01/13/17 1230 82 (!) 34 (!) 179/96 100 %   01/13/17 1215 84 26 181/81 100 %   01/13/17 1115 79 27 (!) 163/94 94 %   01/13/17 1030 82 22 171/81 -       Physical Exam:   Gen: Thin, frail elderly lady in no distress, fatigued, nontoxic. Eyes/nose: PERRL. No nasal polyps. Mouth/throat: MMM. Neck: Supple. No supraclavicular or cervical LAD. No thyromegaly. Cardiac: RRR, 1/5 VENECIA, gallop. Pulm:  moderate coarse rales especially in posterior bases, no wheezes. Abdomen: Mildly distended, NABS, tympanic, soft, non-tender. Ext: Radial/DP pulses intact bilat, trace bilateral edema  Skin: Warm/dry. No bruising/rashes. Neuro: A&Ox3. CN II-XII grossly intact, no focal deficits, speech clear and appropriate.      Lab/Data Reviewed:  BMP:   Lab Results   Component Value Date/Time     01/14/2017 03:45 AM    K 3.7 01/14/2017 03:45 AM     01/14/2017 03:45 AM    CO2 30 01/14/2017 03:45 AM    AGAP 10 01/14/2017 03:45 AM     (H) 01/14/2017 03:45 AM    BUN 24 (H) 01/14/2017 03:45 AM    CREA 1.52 (H) 01/14/2017 03:45 AM    GFRAA 39 (L) 01/14/2017 03:45 AM    GFRNA 32 (L) 01/14/2017 03:45 AM     CBC:   Lab Results   Component Value Date/Time    WBC 4.9 01/14/2017 03:45 AM    HGB 10.3 (L) 01/14/2017 03:45 AM    HCT 30.6 (L) 01/14/2017 03:45 AM     (L) 01/14/2017 03:45 AM        Scheduled Medications Reviewed:  Current Facility-Administered Medications   Medication Dose Route Frequency    aspirin chewable tablet 81 mg  81 mg Oral DAILY    levothyroxine (SYNTHROID) tablet 50 mcg  50 mcg Oral ACB    ferrous sulfate tablet 325 mg  1 Tab Oral BID WITH MEALS    Lactobacillus Acidoph & Bulgar (FLORANEX) tablet 2 Tab  2 Tab Oral BID    potassium chloride (K-DUR, KLOR-CON) SR tablet 20 mEq  20 mEq Oral DAILY    heparin (porcine) injection 5,000 Units  5,000 Units SubCUTAneous Q8H    scopolamine (TRANSDERM-SCOP) 1.5 mg  1.5 mg TransDERmal Q72H    carvedilol (COREG) tablet 25 mg  25 mg Oral BID WITH MEALS    furosemide (LASIX) injection 20 mg  20 mg IntraVENous BID    cefTRIAXone (ROCEPHIN) 1 g in 0.9% sodium chloride (MBP/ADV) 50 mL MBP  1 g IntraVENous Q24H    doxycycline (VIBRAMYCIN) 100 mg in 0.9% sodium chloride (MBP/ADV) 100 mL IVPB  100 mg IntraVENous Q12H         Imaging, microbiology, and EKG/Telemetry:  none    Assessment/Plan   Saundra Álvarez is a 80 y.o. AAF w/ PMH sig for chronic combined CHF, CKD-4, HTN, hypothyroidism, anemia, arthritis who presented w/ dyspnea, and is being admitted for CHF exacerbation, pneumonia.      Acute exacerbation of chronic combined CHF:  2010 ECHO EF 15-20% w/ G2DD, peak pressure was 40; repeat. Volume overload suggested by exam, proBNP >175,000, CT w/ pleural effusions. Cardiac enzymes WNL x2, EKG non-acute. Consult to Cards pending  Tele monitor. Hold home bumex, lasix 20mg IV BID, watch renal indices and electrolytes. Continue home BB. ACE- allergy, intolerance to cozaar. May consider spironolactone. Daily weights. Strict I&Os. ICS.    Hx intermittent Vtach:  As above.     Pulmonary infiltrates on CT:1/4 SIRS  Continue Ceftriaxone and Doxy. Hx of allergy to cipro, sulfa. Follow 1/13/17 blood cultures. UA w/ some LE, send for cx.     Nausea:  Scopolamine patch.    LE edema:  Somewhat unequal R>L, check PVL.    CKD-4:  Cr 1.52 (baseline ~1.6)  Monitor renal fx, volume status.     Prolonged QTC:  1/13/17   Cautious use of antiemetics, abx.     Acute on chronic pain:  No warm/swollen joints on exam.  CK WNL. Hx of codeine and tylenol allergy.     Hypothyroidism:  TSH WNL, continue current home dose levothyroxine.     Normocytic anemia:  Hgb 10.3; trend. Iron studies pending. Continue home iron supps. Hemoccult stools.      Ketonuria:  Likely 2/2 starvation. Per pt preference, clears diet, advance as tolerated.     Depressed mood:  Consider PHQ-9.     Moderate protein calorie malnutrition:  Diffuse atrophy, poor PO intake, albumin 3.7  Renal supps. Nutrition consult.        Global:  Advance to dysphagia diet. OOB w/ assistance, fall precautions, PT/OT. SQH for DVT ppx. PPI for GI ulcer ppx. DNR. Emergency contact: Clearance Clap (daughter-in-law) [phone] (c), [phone] (h).     Phyllis Manzanares MD   1/14/2017, 8:49 AM

## 2017-01-14 NOTE — ED NOTES
Hourly rounding complete. Safety  Pt resting   [ x]  On stretcher with side rails up and bed in locked position, call bell within reach  [  ]  Sitting in chair with casters locked, call bell within reach    Toileting  [ xt denies need to use bathroom  [  ] pt assisted to bathroom  [  ] pt assisted with bedpan  [  ] pt independent to bathroom as needed    Ongoing Plan of Care  Plan of care and expected time for test and results reviewed with pt.     Pain Management / Comfort  [  ] dimmed lights  [  ] warm blanket provided  [  ] pain assessed  [x  ] monitor alarms reviewed

## 2017-01-14 NOTE — ED NOTES
Hourly rounding complete. Safety  Pt resting   [x  ]  On stretcher with side rails up and bed in locked position, call bell within reach  [  ]  Sitting in chair with casters locked, call bell within reach    Toileting  [xpt denies need to use bathroom  [  ] pt assisted to bathroom  [  ] pt assisted with bedpan  [  ] pt independent to bathroom as needed    Ongoing Plan of Care  Plan of care and expected time for test and results reviewed with pt.     Pain Management / Comfort  [  ] dimmed lights  [  ] warm blanket provided  [  ] pain assessed  [x  ] monitor alarms reviewed

## 2017-01-14 NOTE — ED NOTES
Patient's family requesting to be called when patient is moved over to hospital room.  Numbers and relation to patient as follows:  [phone], Summer Weathers, Son

## 2017-01-14 NOTE — ED NOTES
Hourly rounding complete. Safety  Pt resting   [x  ]  On stretcher with side rails up and bed in locked position, call bell within reach  [  ]  Sitting in chair with casters locked, call bell within reach    Toileting  [  ] pt denies need to use bathroom  [  ] pt assisted to bathroom  [ x ] pt assisted with bedpan  [  ] pt independent to bathroom as needed    Ongoing Plan of Care  Plan of care and expected time for test and results reviewed with pt.     Pain Management / Comfort  [  ] dimmed lights  [  ] warm blanket provided  [  ] pain assessed  [x  ] monitor alarms reviewed

## 2017-01-14 NOTE — ED NOTES
Hourly rounding complete. Safety  Pt resting   [ * ]  On stretcher with side rails up and bed in locked position, call bell within reach  [  ]  Sitting in chair with casters locked, call bell within reach    Toileting  [  ] pt denies need to use bathroom  [  ] pt assisted to bathroom  [  ] pt assisted with bedpan  [  ] pt independent to bathroom as needed    Ongoing Plan of Care  Plan of care and expected time for test and results reviewed with pt.     Pain Management / Comfort  [  ] dimmed lights  [  ] warm blanket provided  [ * ] pain assessed  [*  ] monitor alarms reviewed

## 2017-01-14 NOTE — ED NOTES
Hourly rounding complete. Safety  Pt resting   [ x ]  On stretcher with side rails up and bed in locked position, call bell within reach  [  ]  Sitting in chair with casters locked, call bell within reach    Toileting  [  ] pt denies need to use bathroom  [  ] pt assisted to bathroom  x[  ] pt assisted with bedpan  [  ] pt independent to bathroom as needed    Ongoing Plan of Care  Plan of care and expected time for test and results reviewed with pt.     Pain Management / Comfort  [  ] dimmed lights  [  ] warm blanket provided  [  ] pain assessed  [x  ] monitor alarms reviewed

## 2017-01-14 NOTE — ED NOTES
Hourly rounding complete. Safety  Pt resting   [ x ]  On stretcher with side rails up and bed in locked position, call bell within reach  [  ]  Sitting in chair with casters locked, call bell within reach    Toileting  [  ] pt denies need to use bathroom  [ x ] pt assisted to bathroom  [  ] pt assisted with bedpan  [  ] pt independent to bathroom as needed    Ongoing Plan of Care  Plan of care and expected time for test and results reviewed with pt.     Pain Management / Comfort  [  ] dimmed lights  [  ] warm blanket provided  [  ] pain assessed  [x ] monitor alarms reviewed

## 2017-01-14 NOTE — ED NOTES
Hourly rounding complete. Safety  Pt resting   [ x ]  On stretcher with side rails up and bed in locked position, call bell within reach  [  ]  Sitting in chair with casters locked, call bell within reach    Toileting  [  ] pt denies need to use bathroom  [  x] pt assisted to bathroom  [  ] pt assisted with bedpan  [  ] pt independent to bathroom as needed    Ongoing Plan of Care  Plan of care and expected time for test and results reviewed with pt.     Pain Management / Comfort  [  ] dimmed lights  [  ] warm blanket provided  [  ] pain assessed  [x monitor alarms reviewed

## 2017-01-14 NOTE — ED NOTES
Hourly rounding complete. Safety  Pt resting   [x  ]  On stretcher with side rails up and bed in locked position, call bell within reach  [  ]  Sitting in chair with casters locked, call bell within reach    Toileting  [  ] pt denies need to use bathroom  [  ] pt assisted to bathroom  [  ] pt assisted with bedpan  [x  ] pt independent to bathroom as needed    Ongoing Plan of Care  Plan of care and expected time for test and results reviewed with pt.     Pain Management / Comfort  [  ] dimmed lights  [  ] warm blanket provided  [  ] pain assessed  [  x] monitor alarms reviewed

## 2017-01-14 NOTE — PROCEDURES
[facility]  [facility] ***    Name: Margi Alas  MRN: [id number]  : 1928  HIS Order #: [id number]  [id number] [place] [facility] Visit #: [id number]  Date: 2017    TYPE OF TEST: Peripheral Venous Testing    REASON FOR TEST  Edema    Right Leg:-  Deep venous thrombosis:           No  Superficial venous thrombosis:    No  Deep venous insufficiency:        Not examined  Superficial venous insufficiency: Not examined      INTERPRETATION/FINDINGS  Duplex images were obtained using 2-D gray scale, color flow, and  spectral Doppler analysis. Right leg :  1. No evidence of deep venous thrombosis detected in the veins  visualized. 2. Deep vein(s) visualized include the common femoral, femoral,  popliteal, posterior tibial, and peroneal veins. 3. No evidence of superficial thrombosis detected. 4. Superficial vein(s) visualized include the great saphenous vein at  the sapheno-femoral junction. 5. No evidence of deep vein thrombosis in the contralateral common  femoral vein. ADDITIONAL COMMENTS    I have personally reviewed the data relevant to the interpretation of  this  study. TECHNOLOGIST: Ramin Barton.  Oanh STARKS  Signed: 2017 09:23 AM    PHYSICIAN: Cali Hoang MD  Signed: 2017 12:21 PM

## 2017-01-14 NOTE — ED NOTES
Bedside shift change report given to Ana Gonsalez, RN  (oncoming nurse) by Bonnie Vogel RN  (offgoing nurse). Report included the following information SBAR, ED Summary, MAR and Recent Results. Pt is on stretcher, in gown, on monitor. Pt is awaiting inpatient bed at this time. Pt has side rail up; call bell within reach, bedside commode. Pt is AOX4 with IV access.

## 2017-01-14 NOTE — ED NOTES
Hourly rounding complete. Safety  Pt resting   [ x ]  On stretcher with side rails up and bed in locked position, call bell within reach  [  ]  Sitting in chair with casters locked, call bell within reach    Toileting  [  ] pt denies need to use bathroom  [x  ] pt assisted to bathroom  [  ] pt assisted with bedpan  [  ] pt independent to bathroom as needed    Ongoing Plan of Care  Plan of care and expected time for test and results reviewed with pt.     Pain Management / Comfort  [  ] dimmed lights  [  ] warm blanket provided  [  ] pain assessed  [ x ] monitor alarms reviewed

## 2017-01-14 NOTE — ED NOTES
Hourly rounding complete. Safety  Pt resting   [x  ]  On stretcher with side rails up and bed in locked position, call bell within reach  [  ]  Sitting in chair with casters locked, call bell within reach    Toileting  [  ] pt denies need to use bathroom  [  ] pt assisted to bathroom  [x  ] pt assisted with bedpan  [  ] pt independent to bathroom as needed    Ongoing Plan of Care  Plan of care and expected time for test and results reviewed with pt.     Pain Management / Comfort  [  ] dimmed lights  [  ] warm blanket provided  [  ] pain assessed  [ x ] monitor alarms reviewed

## 2017-01-15 LAB
ANION GAP BLD CALC-SCNC: 9 MMOL/L (ref 3–18)
BACTERIA SPEC CULT: NORMAL
BASOPHILS # BLD AUTO: 0 K/UL (ref 0–0.1)
BASOPHILS # BLD: 0 % (ref 0–2)
BUN SERPL-MCNC: 26 MG/DL (ref 7–18)
BUN/CREAT SERPL: 17 (ref 12–20)
CALCIUM SERPL-MCNC: 8.5 MG/DL (ref 8.5–10.1)
CHLORIDE SERPL-SCNC: 101 MMOL/L (ref 100–108)
CO2 SERPL-SCNC: 32 MMOL/L (ref 21–32)
CREAT SERPL-MCNC: 1.53 MG/DL (ref 0.6–1.3)
DIFFERENTIAL METHOD BLD: ABNORMAL
EOSINOPHIL # BLD: 0.1 K/UL (ref 0–0.4)
EOSINOPHIL NFR BLD: 2 % (ref 0–5)
ERYTHROCYTE [DISTWIDTH] IN BLOOD BY AUTOMATED COUNT: 12.7 % (ref 11.6–14.5)
GLUCOSE BLD STRIP.AUTO-MCNC: 143 MG/DL (ref 70–110)
GLUCOSE SERPL-MCNC: 85 MG/DL (ref 74–99)
HCT VFR BLD AUTO: 29.1 % (ref 35–45)
HGB BLD-MCNC: 9.6 G/DL (ref 12–16)
LYMPHOCYTES # BLD AUTO: 21 % (ref 21–52)
LYMPHOCYTES # BLD: 0.9 K/UL (ref 0.9–3.6)
MAGNESIUM SERPL-MCNC: 1.8 MG/DL (ref 1.8–2.4)
MCH RBC QN AUTO: 30.8 PG (ref 24–34)
MCHC RBC AUTO-ENTMCNC: 33 G/DL (ref 31–37)
MCV RBC AUTO: 93.3 FL (ref 74–97)
MONOCYTES # BLD: 0.5 K/UL (ref 0.05–1.2)
MONOCYTES NFR BLD AUTO: 12 % (ref 3–10)
NEUTS SEG # BLD: 3 K/UL (ref 1.8–8)
NEUTS SEG NFR BLD AUTO: 65 % (ref 40–73)
PLATELET # BLD AUTO: 134 K/UL (ref 135–420)
PMV BLD AUTO: 9.9 FL (ref 9.2–11.8)
POTASSIUM SERPL-SCNC: 3.4 MMOL/L (ref 3.5–5.5)
RBC # BLD AUTO: 3.12 M/UL (ref 4.2–5.3)
SERVICE CMNT-IMP: NORMAL
SODIUM SERPL-SCNC: 142 MMOL/L (ref 136–145)
WBC # BLD AUTO: 4.6 K/UL (ref 4.6–13.2)

## 2017-01-15 PROCEDURE — 94760 N-INVAS EAR/PLS OXIMETRY 1: CPT

## 2017-01-15 PROCEDURE — 74011250637 HC RX REV CODE- 250/637: Performed by: INTERNAL MEDICINE

## 2017-01-15 PROCEDURE — 74011000258 HC RX REV CODE- 258: Performed by: FAMILY MEDICINE

## 2017-01-15 PROCEDURE — 65660000000 HC RM CCU STEPDOWN

## 2017-01-15 PROCEDURE — 83735 ASSAY OF MAGNESIUM: CPT | Performed by: FAMILY MEDICINE

## 2017-01-15 PROCEDURE — 74011250637 HC RX REV CODE- 250/637: Performed by: FAMILY MEDICINE

## 2017-01-15 PROCEDURE — 93306 TTE W/DOPPLER COMPLETE: CPT

## 2017-01-15 PROCEDURE — 80048 BASIC METABOLIC PNL TOTAL CA: CPT | Performed by: FAMILY MEDICINE

## 2017-01-15 PROCEDURE — 82962 GLUCOSE BLOOD TEST: CPT

## 2017-01-15 PROCEDURE — 74011000250 HC RX REV CODE- 250: Performed by: INTERNAL MEDICINE

## 2017-01-15 PROCEDURE — 85025 COMPLETE CBC W/AUTO DIFF WBC: CPT | Performed by: FAMILY MEDICINE

## 2017-01-15 PROCEDURE — 74011000250 HC RX REV CODE- 250: Performed by: FAMILY MEDICINE

## 2017-01-15 PROCEDURE — 77010033678 HC OXYGEN DAILY

## 2017-01-15 PROCEDURE — 36415 COLL VENOUS BLD VENIPUNCTURE: CPT | Performed by: FAMILY MEDICINE

## 2017-01-15 PROCEDURE — 74011250636 HC RX REV CODE- 250/636: Performed by: FAMILY MEDICINE

## 2017-01-15 RX ORDER — HYDRALAZINE HYDROCHLORIDE 25 MG/1
25 TABLET, FILM COATED ORAL 3 TIMES DAILY
Status: DISCONTINUED | OUTPATIENT
Start: 2017-01-15 | End: 2017-01-17 | Stop reason: HOSPADM

## 2017-01-15 RX ADMIN — POTASSIUM CHLORIDE 20 MEQ: 1500 TABLET, EXTENDED RELEASE ORAL at 08:32

## 2017-01-15 RX ADMIN — LEVOTHYROXINE SODIUM 50 MCG: 50 TABLET ORAL at 08:32

## 2017-01-15 RX ADMIN — HEPARIN SODIUM 5000 UNITS: 5000 INJECTION, SOLUTION INTRAVENOUS; SUBCUTANEOUS at 05:42

## 2017-01-15 RX ADMIN — BUMETANIDE 1 MG: 0.25 INJECTION, SOLUTION INTRAMUSCULAR; INTRAVENOUS at 08:31

## 2017-01-15 RX ADMIN — CARVEDILOL 25 MG: 25 TABLET, FILM COATED ORAL at 08:32

## 2017-01-15 RX ADMIN — ISOSORBIDE MONONITRATE 30 MG: 30 TABLET, EXTENDED RELEASE ORAL at 08:31

## 2017-01-15 RX ADMIN — HYDRALAZINE HYDROCHLORIDE 25 MG: 25 TABLET, FILM COATED ORAL at 12:46

## 2017-01-15 RX ADMIN — LACTOBACILLUS TAB 2 TABLET: TAB at 16:08

## 2017-01-15 RX ADMIN — DOXYCYCLINE 100 MG: 100 INJECTION, POWDER, LYOPHILIZED, FOR SOLUTION INTRAVENOUS at 12:46

## 2017-01-15 RX ADMIN — CEFTRIAXONE 1 G: 1 INJECTION, POWDER, FOR SOLUTION INTRAMUSCULAR; INTRAVENOUS at 16:05

## 2017-01-15 RX ADMIN — Medication 325 MG: at 16:08

## 2017-01-15 RX ADMIN — DOXYCYCLINE 100 MG: 100 INJECTION, POWDER, LYOPHILIZED, FOR SOLUTION INTRAVENOUS at 05:42

## 2017-01-15 RX ADMIN — Medication 325 MG: at 08:31

## 2017-01-15 RX ADMIN — ASPIRIN 81 MG 81 MG: 81 TABLET ORAL at 08:32

## 2017-01-15 RX ADMIN — DOXYCYCLINE 100 MG: 100 INJECTION, POWDER, LYOPHILIZED, FOR SOLUTION INTRAVENOUS at 23:51

## 2017-01-15 RX ADMIN — THERA TABS 1 TABLET: TAB at 08:37

## 2017-01-15 RX ADMIN — HEPARIN SODIUM 5000 UNITS: 5000 INJECTION, SOLUTION INTRAVENOUS; SUBCUTANEOUS at 21:30

## 2017-01-15 RX ADMIN — HYDRALAZINE HYDROCHLORIDE 25 MG: 25 TABLET, FILM COATED ORAL at 21:30

## 2017-01-15 RX ADMIN — HEPARIN SODIUM 5000 UNITS: 5000 INJECTION, SOLUTION INTRAVENOUS; SUBCUTANEOUS at 12:46

## 2017-01-15 RX ADMIN — LACTOBACILLUS TAB 2 TABLET: TAB at 08:32

## 2017-01-15 RX ADMIN — BUMETANIDE 1 MG: 0.25 INJECTION, SOLUTION INTRAMUSCULAR; INTRAVENOUS at 21:31

## 2017-01-15 RX ADMIN — ATORVASTATIN CALCIUM 20 MG: 20 TABLET, FILM COATED ORAL at 21:30

## 2017-01-15 NOTE — ED NOTES
Pt hourly rounding competed. Safety   Pt (x) resting on stretcher with side rails up and call bell in reach. () in chair    () in parents arms. Toileting   Pt offered ()Bedpan     (x)Assistance to Restroom     ()Urinal  Ongoing Updates  Updated on plan of care and status of test results.   Pain Management  Inquired as to comfort and offered comfort measures:    () warm blankets   (x) dimmed lights

## 2017-01-15 NOTE — PROGRESS NOTES
Intern Progress Note  [facility]       Patient: Radha Chu MRN: [id number]  CSN: [id number]    YOB: 1928  Age: 80 y.o. Sex: female    DOA: 1/13/2017 LOS:  LOS: 2 days                    Subjective:     Acute events: No acute events overnight. Patient reported being able to rest last night. She notes overall improvement, but feels that her respiratory status is unchanged.  Denies headache, lightheadedness, abdominal pain, n/v.      Objective:      Patient Vitals for the past 24 hrs:   Pulse Resp BP SpO2   01/15/17 0100 - - 156/64 100 %   01/15/17 0045 - - - 100 %   01/15/17 0030 - - 139/56 100 %   01/15/17 0000 60 16 149/63 100 %   01/14/17 2330 62 18 147/64 100 %   01/14/17 2300 61 17 148/64 100 %   01/14/17 2235 66 - 144/69 -   01/14/17 1900 60 24 143/52 100 %   01/14/17 1845 61 20 - 100 %   01/14/17 1830 83 27 110/55 98 %   01/14/17 1815 65 21 - 100 %   01/14/17 1800 64 17 149/68 100 %   01/14/17 1745 60 23 - 100 %   01/14/17 1737 73 - 151/70 -   01/14/17 1730 71 22 155/89 99 %   01/14/17 1715 61 19 - 100 %   01/14/17 1700 61 19 151/70 100 %   01/14/17 1645 61 22 - 100 %   01/14/17 1630 61 19 166/65 100 %   01/14/17 1615 61 16 - 100 %   01/14/17 1545 60 19 - 100 %   01/14/17 1530 60 17 153/64 100 %   01/14/17 1515 62 18 - 100 %   01/14/17 1500 64 18 152/58 100 %   01/14/17 1445 62 20 - 100 %   01/14/17 1430 61 22 138/60 100 %   01/14/17 1415 62 18 - 100 %   01/14/17 1400 61 13 (!) 156/93 100 %   01/14/17 1345 60 21 - 100 %   01/14/17 1330 74 18 149/64 100 %   01/14/17 1315 66 18 - -   01/14/17 1245 62 21 - -   01/14/17 1230 62 18 - 100 %   01/14/17 1215 61 18 - 99 %   01/14/17 1200 63 17 144/84 99 %   01/14/17 1145 64 19 - 100 %   01/14/17 1130 79 26 123/60 96 %   01/14/17 1115 73 28 - 100 %   01/14/17 1100 60 24 138/57 100 %   01/14/17 1045 67 26 - 100 %   01/14/17 1030 - - - 100 %   01/14/17 1030 68 18 153/75 100 %   01/14/17 1022 62 - 151/66 -   01/14/17 1015 64 23 - 100 %   01/14/17 1000 70 22 - 99 %   01/14/17 0845 60 19 - 100 %   01/14/17 0830 72 21 153/89 98 %   01/14/17 0815 67 18 - 100 %   01/14/17 0800 60 18 161/59 100 %   01/14/17 0745 61 18 - 98 %   01/14/17 0715 63 21 - 98 %   01/14/17 0700 66 18 152/69 97 %   01/14/17 0645 60 18 - 98 %   01/14/17 0630 60 16 159/56 99 %   01/14/17 0615 60 17 - 98 %   01/14/17 0600 60 18 163/61 98 %   01/14/17 0545 62 17 - 98 %   01/14/17 0530 79 (!) 34 152/67 93 %   01/14/17 0515 80 30 - 93 %   01/14/17 0500 65 20 154/73 99 %   01/14/17 0445 60 18 - 98 %   01/14/17 0430 62 18 155/82 96 %   01/14/17 0415 60 17 - 100 %   01/14/17 0400 61 17 161/61 97 %   01/14/17 0345 60 18 - 98 %   01/14/17 0330 60 18 157/58 98 %   01/14/17 0315 62 18 - 99 %   01/14/17 0300 60 16 159/61 98 %   01/14/17 0245 60 18 - 97 %   01/14/17 0230 60 17 160/65 96 %       Physical Exam:   Gen: Thin, frail elderly lady in no distress, fatigued, nontoxic. Eyes/nose: PERRL. No nasal polyps. Mouth/throat: MMM. Neck: Supple. No supraclavicular or cervical LAD. No thyromegaly. Cardiac: RRR, 1/5 VENECIA, gallop. Pulm: moderate coarse rales especially in posterior bases, no wheezes. Abdomen: Mildly distended, NABS, tympanic, soft, non-tender. Ext: Radial/DP pulses intact bilat, no bilateral edema  Skin: Warm/dry. No bruising/rashes. Neuro: A&Ox3. CN II-XII grossly intact, no focal deficits, speech clear and appropriate.     Lab/Data Reviewed:  BMP:   Lab Results   Component Value Date/Time     01/15/2017 05:46 AM    K 3.4 (L) 01/15/2017 05:46 AM     01/15/2017 05:46 AM    CO2 32 01/15/2017 05:46 AM    AGAP 9 01/15/2017 05:46 AM    GLU 85 01/15/2017 05:46 AM    BUN 26 (H) 01/15/2017 05:46 AM    CREA 1.53 (H) 01/15/2017 05:46 AM    GFRAA 39 (L) 01/15/2017 05:46 AM    GFRNA 32 (L) 01/15/2017 05:46 AM     CBC:   Lab Results   Component Value Date/Time    WBC 4.6 01/15/2017 05:46 AM    HGB 9.6 (L) 01/15/2017 05:46 AM    HCT 29.1 (L) 01/15/2017 05:46 AM     (L) 01/15/2017 05:46 AM        Scheduled Medications Reviewed:  Current Facility-Administered Medications   Medication Dose Route Frequency    therapeutic multivitamin (THERAGRAN) tablet 1 Tab  1 Tab Oral DAILY    bumetanide (BUMEX) injection 1 mg  1 mg IntraVENous Q12H    isosorbide mononitrate ER (IMDUR) tablet 30 mg  30 mg Oral DAILY    atorvastatin (LIPITOR) tablet 20 mg  20 mg Oral QHS    aspirin chewable tablet 81 mg  81 mg Oral DAILY    levothyroxine (SYNTHROID) tablet 50 mcg  50 mcg Oral ACB    ferrous sulfate tablet 325 mg  1 Tab Oral BID WITH MEALS    Lactobacillus Acidoph & Bulgar (FLORANEX) tablet 2 Tab  2 Tab Oral BID    potassium chloride (K-DUR, KLOR-CON) SR tablet 20 mEq  20 mEq Oral DAILY    heparin (porcine) injection 5,000 Units  5,000 Units SubCUTAneous Q8H    scopolamine (TRANSDERM-SCOP) 1.5 mg  1.5 mg TransDERmal Q72H    carvedilol (COREG) tablet 25 mg  25 mg Oral BID WITH MEALS    cefTRIAXone (ROCEPHIN) 1 g in 0.9% sodium chloride (MBP/ADV) 50 mL MBP  1 g IntraVENous Q24H    doxycycline (VIBRAMYCIN) 100 mg in 0.9% sodium chloride (MBP/ADV) 100 mL IVPB  100 mg IntraVENous Q12H         Imaging, microbiology, and EKG/Telemetry:  1/14 Right PVL negative    Assessment/Plan   Kristin Adan is a 80 y.o. AAF w/ PMH sig for chronic combined CHF, CKD-4, HTN, hypothyroidism, anemia, arthritis who presented w/ dyspnea, and is being admitted for CHF exacerbation, pneumonia.       Acute exacerbation of chronic combined CHF:  2010 ECHO EF 15-20% w/ G2DD, peak pressure was 40; repeat. Volume overload suggested by exam, proBNP >175,000, CT w/ pleural effusions. Cardiac enzymes WNL x2, EKG non-acute. Cards following; echo pending. Plan for ICD interrogation 1/16. Tele monitor. Continue IV bumex 1mg BID, watch renal indices and electrolytes. Continue home BB. ACE- allergy, intolerance to cozaar. Daily weights. Strict I&Os. ICS.          Hx intermittent Vtach:  As above.      Pulmonary infiltrates on CT:1/4 SIRS  Continue Ceftriaxone and Doxy. Hx of allergy to cipro, sulfa. Follow 1/13/17 blood cultures. UA w/ some LE, send for cx.      Nausea:  Scopolamine patch.       LE edema:  Right PVL negative      CKD-4:  Cr 1.53 (baseline ~1.6)  Monitor renal fx, volume status.      Prolonged QTC:  1/13/17   Cautious use of antiemetics, abx.      Acute on chronic pain:  No warm/swollen joints on exam.  CK WNL. Hx of codeine and tylenol allergy.      Hypothyroidism:  TSH WNL, continue current home dose levothyroxine.      Normocytic anemia:  Hgb 9.6; trend. Iron studies pending. Continue home iron supps. Hemoccult stools.       Ketonuria:  Likely 2/2 starvation. Per pt preference, clears diet, advance as tolerated.      Depressed mood:  Consider PHQ-9.      Moderate protein calorie malnutrition:  Diffuse atrophy, poor PO intake, albumin 3.7  Renal supps. Nutrition consult.          Global:  Advance to dysphagia diet. OOB w/ assistance, fall precautions, PT/OT. SQH for DVT ppx. PPI for GI ulcer ppx. DNR. Emergency contact: Ayla Garner (daughter-in-law) [phone] (c), [phone] (h).       Shawnee Samaniego MD   1/15/2017, 2:18 AM

## 2017-01-15 NOTE — ROUTINE PROCESS
Bedside and Verbal shift change report given to Kurtis Moctezuma (oncoming nurse) by Dhaval Edwards. Augustin Hill (offgoing nurse). Report included the following information Kardex, Intake/Output and MAR.

## 2017-01-15 NOTE — PROGRESS NOTES
PM Respiratory Check  [facility]       Patient: Jennifer Cornelius MRN: [id number]  CSN: [id number]    YOB: 1928  Age: 80 y.o. Sex: female    DOA: 1/13/2017 LOS:  LOS: 0 days                    Acute events: Ms. Hubert Najera is resting comfortable in no acute distress. O2 saturation is 100% on 1.5L supplemental O2 via NC. Patient believes her respiratory status is improving. PE:  Lungs: Rales auscultated at posterior inferior lung bases. Assessment/Plan   Continue current regimen.       Radha Mills MD   1/14/2017, 10:17 PM

## 2017-01-15 NOTE — ED NOTES
Bedside and Verbal shift change report given to Monica Emerson [facility] (oncoming nurse) by Roxy Romero RN (offgoing nurse). Report included the following information SBAR, ED Summary and MAR.

## 2017-01-15 NOTE — PROGRESS NOTES
[facility]  -  Progress Note      Patient: Christine Christine MRN: [id number]  SSN: [id number]    YOB: 1928  Age: 80 y.o. Sex: female      Admit Date: 1/13/2017    Assessment:     1. Acute on chronic systolic heart failure. . She has chronic NYHA class III symptoms. She states she has not been taking her Bumex regularly as instructed by her nephrologist. Improving with IV Bumex  2. Ischemic cardiomyopathy. Ejection fraction historically in the 20-25% range. Last assessed in 2010  3. Mitral valve regurgitation, status post annuloplasty ring. No pronounced murmur on exam today. 4. Coronary disease. History of LIMA to LAD, and bare-metal stenting to her RCA. Symptoms do not sound suspicious for angina. Her cardiac markers are unremarkable. No new EKG changes. 5. Status post dual-chamber ICD. 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 6. Hypertension. Patient's blood pressure still elevated in the hospital thus far. Imdur added to regimen  7. Chronic kidney disease, stage III. This appears stable. She follows up with her nephrologist.  8. History of angioedema while taking lisinopril. 9. Dyslipidemia. Started on statin this admission.     Plan:     -- will add hydralazine to regimen  -- continue IV Bumex for today and likely transition back to po tomorrow  -- echocardiogram today  -- device interrogation tomorrow    Hopefully home in 1-2 days      Subjective:     Dyspnea improved somewhat, still feeling weak    Objective:      Patient Vitals for the past 8 hrs:   Temp Pulse Resp BP SpO2   01/15/17 0758 97.6 °F (36.4 °C) 60 17 163/70 99 %   01/15/17 0431 97.5 °F (36.4 °C) 65 15 163/74 100 %   01/15/17 0230 97.2 °F (36.2 °C) 62 18 155/69 -         Patient Vitals for the past 96 hrs:   Weight   01/15/17 0530 52.3 kg (115 lb 4.8 oz)   01/15/17 0431 52.5 kg (115 lb 12.8 oz)   01/13/17 0826 54 kg (119 lb)         Intake/Output Summary (Last 24 hours) at 01/15/17 [address] [place] [address] filed at 01/15/17 0954   Gross per 24 hour   Intake              240 ml   Output             1500 ml   Net            -1260 ml       Physical Exam:  General:  alert, cooperative, no distress, appears stated age  Neck:  no JVD  Lungs:  diminished breath sounds R base, L base  Heart:  regular rate and rhythm  Abdomen:  abdomen is soft without significant tenderness, masses, organomegaly or guarding  Extremities:  extremities normal, atraumatic, no cyanosis or edema    Data Review:     Labs: Results:       Chemistry Recent Labs      01/15/17   0546  01/14/17 0345 01/13/17   1135   GLU  85  143*  114*   NA  142  142  141   K  3.4*  3.7  4.9   CL  101  102  104   CO2  32  30  28   BUN  26*  24*  21*   CREA  1.53*  1.52*  1.39*   CA  8.5  9.0  10.0   MG  1.8  1.8   --    AGAP  9  10  9   BUCR  17  16  15   AP   --    --   21*   TP   --    --   7.1   ALB   --    --   3.7   GLOB   --    --   3.4   AGRAT   --    --   1.1      CBC w/Diff Recent Labs      01/15/17   0546  01/14/17 0345 01/13/17   1135   WBC  4.6  4.9  4.8   RBC  3.12*  3.30*  3.77*   HGB  9.6*  10.3*  11.6*   HCT  29.1*  30.6*  35.3   PLT  134*  130*  156   GRANS  65  77*  66   LYMPH  21  14*  20*   EOS  2  0  2      Cardiac Enzymes No results found for: CPK, CKMMB, CKMB, RCK3, CKMBT, CKNDX, CKND1, RHONDA, TROPT, TROIQ, LAMBERT, TROPT, TNIPOC, BNP, BNPP   Coagulation No results for input(s): PTP, INR, APTT in the last 72 hours.     No lab exists for component: INREXT    Lipid Panel Lab Results   Component Value Date/Time    Cholesterol, total 183 12/07/2016 04:31 PM    HDL Cholesterol 67 12/07/2016 04:31 PM    LDL,Direct 66 07/29/2010 12:18 PM    LDL, calculated 106 12/07/2016 04:31 PM    VLDL, calculated 10 12/07/2016 04:31 PM    Triglyceride 50 12/07/2016 04:31 PM    CHOL/HDL Ratio 2.7 12/07/2016 04:31 PM      BNP No results found for: BNP, BNPP, XBNPT   Liver Enzymes Recent Labs      01/13/17   1135   TP  7.1   ALB  3.7   AP  21*   SGOT  36 Digoxin    Thyroid Studies Lab Results   Component Value Date/Time    TSH 2.53 01/13/2017 11:35 AM

## 2017-01-15 NOTE — ED NOTES
Assuming care of patient, patient resting comfortably on stretcher. Denies any complaints at this time. Continues to receive 2 liters O2 via NC. Family members at bedside. Vital signs are stable. Will continue to monitor patient.

## 2017-01-15 NOTE — ED NOTES
Bedside nursing report given to con garcia RN. Patient being transported to floor by Nursing supervisor and MARSHAL andujar. Patient looks comfortable, not complaining of anything at this time.

## 2017-01-16 LAB
ANION GAP BLD CALC-SCNC: 9 MMOL/L (ref 3–18)
BASOPHILS # BLD AUTO: 0 K/UL (ref 0–0.1)
BASOPHILS # BLD: 0 % (ref 0–2)
BUN SERPL-MCNC: 29 MG/DL (ref 7–18)
BUN/CREAT SERPL: 15 (ref 12–20)
CALCIUM SERPL-MCNC: 8.5 MG/DL (ref 8.5–10.1)
CHLORIDE SERPL-SCNC: 99 MMOL/L (ref 100–108)
CO2 SERPL-SCNC: 34 MMOL/L (ref 21–32)
CREAT SERPL-MCNC: 1.9 MG/DL (ref 0.6–1.3)
DIFFERENTIAL METHOD BLD: ABNORMAL
EOSINOPHIL # BLD: 0.1 K/UL (ref 0–0.4)
EOSINOPHIL NFR BLD: 2 % (ref 0–5)
ERYTHROCYTE [DISTWIDTH] IN BLOOD BY AUTOMATED COUNT: 12.5 % (ref 11.6–14.5)
GLUCOSE SERPL-MCNC: 90 MG/DL (ref 74–99)
HCT VFR BLD AUTO: 29 % (ref 35–45)
HGB BLD-MCNC: 9.4 G/DL (ref 12–16)
LYMPHOCYTES # BLD AUTO: 22 % (ref 21–52)
LYMPHOCYTES # BLD: 1 K/UL (ref 0.9–3.6)
MAGNESIUM SERPL-MCNC: 1.6 MG/DL (ref 1.8–2.4)
MCH RBC QN AUTO: 30.4 PG (ref 24–34)
MCHC RBC AUTO-ENTMCNC: 32.4 G/DL (ref 31–37)
MCV RBC AUTO: 93.9 FL (ref 74–97)
MONOCYTES # BLD: 0.8 K/UL (ref 0.05–1.2)
MONOCYTES NFR BLD AUTO: 16 % (ref 3–10)
NEUTS SEG # BLD: 2.8 K/UL (ref 1.8–8)
NEUTS SEG NFR BLD AUTO: 60 % (ref 40–73)
PLATELET # BLD AUTO: 144 K/UL (ref 135–420)
PMV BLD AUTO: 10 FL (ref 9.2–11.8)
POTASSIUM SERPL-SCNC: 3 MMOL/L (ref 3.5–5.5)
POTASSIUM SERPL-SCNC: 4.2 MMOL/L (ref 3.5–5.5)
RBC # BLD AUTO: 3.09 M/UL (ref 4.2–5.3)
SODIUM SERPL-SCNC: 142 MMOL/L (ref 136–145)
WBC # BLD AUTO: 4.7 K/UL (ref 4.6–13.2)

## 2017-01-16 PROCEDURE — 74011250637 HC RX REV CODE- 250/637: Performed by: FAMILY MEDICINE

## 2017-01-16 PROCEDURE — 74011000258 HC RX REV CODE- 258: Performed by: FAMILY MEDICINE

## 2017-01-16 PROCEDURE — 97165 OT EVAL LOW COMPLEX 30 MIN: CPT

## 2017-01-16 PROCEDURE — 85025 COMPLETE CBC W/AUTO DIFF WBC: CPT | Performed by: FAMILY MEDICINE

## 2017-01-16 PROCEDURE — 74011000250 HC RX REV CODE- 250: Performed by: FAMILY MEDICINE

## 2017-01-16 PROCEDURE — 74011250636 HC RX REV CODE- 250/636: Performed by: FAMILY MEDICINE

## 2017-01-16 PROCEDURE — 97116 GAIT TRAINING THERAPY: CPT

## 2017-01-16 PROCEDURE — 74011000250 HC RX REV CODE- 250: Performed by: INTERNAL MEDICINE

## 2017-01-16 PROCEDURE — 74011250637 HC RX REV CODE- 250/637: Performed by: INTERNAL MEDICINE

## 2017-01-16 PROCEDURE — 83735 ASSAY OF MAGNESIUM: CPT | Performed by: FAMILY MEDICINE

## 2017-01-16 PROCEDURE — 65660000000 HC RM CCU STEPDOWN

## 2017-01-16 PROCEDURE — 36415 COLL VENOUS BLD VENIPUNCTURE: CPT | Performed by: FAMILY MEDICINE

## 2017-01-16 PROCEDURE — 84132 ASSAY OF SERUM POTASSIUM: CPT | Performed by: FAMILY MEDICINE

## 2017-01-16 PROCEDURE — 97161 PT EVAL LOW COMPLEX 20 MIN: CPT

## 2017-01-16 PROCEDURE — 77010033678 HC OXYGEN DAILY

## 2017-01-16 PROCEDURE — 97530 THERAPEUTIC ACTIVITIES: CPT

## 2017-01-16 RX ORDER — MAGNESIUM SULFATE HEPTAHYDRATE 40 MG/ML
2 INJECTION, SOLUTION INTRAVENOUS ONCE
Status: COMPLETED | OUTPATIENT
Start: 2017-01-16 | End: 2017-01-17

## 2017-01-16 RX ORDER — BUMETANIDE 1 MG/1
1 TABLET ORAL DAILY
Status: DISCONTINUED | OUTPATIENT
Start: 2017-01-17 | End: 2017-01-17 | Stop reason: HOSPADM

## 2017-01-16 RX ORDER — RANITIDINE 150 MG/1
75 TABLET, FILM COATED ORAL 2 TIMES DAILY
Status: DISCONTINUED | OUTPATIENT
Start: 2017-01-16 | End: 2017-01-16

## 2017-01-16 RX ORDER — FAMOTIDINE 20 MG/1
20 TABLET, FILM COATED ORAL DAILY
Status: DISCONTINUED | OUTPATIENT
Start: 2017-01-16 | End: 2017-01-17 | Stop reason: HOSPADM

## 2017-01-16 RX ADMIN — MAGNESIUM SULFATE HEPTAHYDRATE 2 G: 40 INJECTION, SOLUTION INTRAVENOUS at 09:03

## 2017-01-16 RX ADMIN — HYDRALAZINE HYDROCHLORIDE 25 MG: 25 TABLET, FILM COATED ORAL at 09:02

## 2017-01-16 RX ADMIN — LEVOTHYROXINE SODIUM 50 MCG: 50 TABLET ORAL at 09:02

## 2017-01-16 RX ADMIN — BUMETANIDE 1 MG: 0.25 INJECTION, SOLUTION INTRAMUSCULAR; INTRAVENOUS at 09:03

## 2017-01-16 RX ADMIN — HEPARIN SODIUM 5000 UNITS: 5000 INJECTION, SOLUTION INTRAVENOUS; SUBCUTANEOUS at 14:20

## 2017-01-16 RX ADMIN — ISOSORBIDE MONONITRATE 30 MG: 30 TABLET, EXTENDED RELEASE ORAL at 09:02

## 2017-01-16 RX ADMIN — Medication 325 MG: at 16:09

## 2017-01-16 RX ADMIN — POTASSIUM CHLORIDE: 2 INJECTION, SOLUTION, CONCENTRATE INTRAVENOUS at 12:00

## 2017-01-16 RX ADMIN — CARVEDILOL 25 MG: 25 TABLET, FILM COATED ORAL at 09:02

## 2017-01-16 RX ADMIN — POTASSIUM CHLORIDE 20 MEQ: 1500 TABLET, EXTENDED RELEASE ORAL at 09:02

## 2017-01-16 RX ADMIN — FAMOTIDINE 20 MG: 20 TABLET ORAL at 14:20

## 2017-01-16 RX ADMIN — POTASSIUM CHLORIDE: 2 INJECTION, SOLUTION, CONCENTRATE INTRAVENOUS at 11:17

## 2017-01-16 RX ADMIN — ATORVASTATIN CALCIUM 20 MG: 20 TABLET, FILM COATED ORAL at 21:28

## 2017-01-16 RX ADMIN — LACTOBACILLUS TAB 2 TABLET: TAB at 09:02

## 2017-01-16 RX ADMIN — POTASSIUM CHLORIDE: 2 INJECTION, SOLUTION, CONCENTRATE INTRAVENOUS at 14:16

## 2017-01-16 RX ADMIN — HEPARIN SODIUM 5000 UNITS: 5000 INJECTION, SOLUTION INTRAVENOUS; SUBCUTANEOUS at 21:27

## 2017-01-16 RX ADMIN — HYDRALAZINE HYDROCHLORIDE 25 MG: 25 TABLET, FILM COATED ORAL at 16:08

## 2017-01-16 RX ADMIN — Medication 325 MG: at 09:02

## 2017-01-16 RX ADMIN — POTASSIUM CHLORIDE: 2 INJECTION, SOLUTION, CONCENTRATE INTRAVENOUS at 09:00

## 2017-01-16 RX ADMIN — CEFTRIAXONE 1 G: 1 INJECTION, POWDER, FOR SOLUTION INTRAMUSCULAR; INTRAVENOUS at 16:08

## 2017-01-16 RX ADMIN — ASPIRIN 81 MG 81 MG: 81 TABLET ORAL at 09:05

## 2017-01-16 RX ADMIN — THERA TABS 1 TABLET: TAB at 09:02

## 2017-01-16 RX ADMIN — POTASSIUM CHLORIDE: 2 INJECTION, SOLUTION, CONCENTRATE INTRAVENOUS at 10:09

## 2017-01-16 RX ADMIN — HEPARIN SODIUM 5000 UNITS: 5000 INJECTION, SOLUTION INTRAVENOUS; SUBCUTANEOUS at 04:18

## 2017-01-16 RX ADMIN — CARVEDILOL 25 MG: 25 TABLET, FILM COATED ORAL at 16:08

## 2017-01-16 RX ADMIN — LACTOBACILLUS TAB 2 TABLET: TAB at 16:09

## 2017-01-16 RX ADMIN — DOXYCYCLINE 100 MG: 100 INJECTION, POWDER, LYOPHILIZED, FOR SOLUTION INTRAVENOUS at 14:20

## 2017-01-16 NOTE — PROGRESS NOTES
[facility]  -  Progress Note      Patient: Faina Anthony MRN: [id number]  SSN: [id number]    YOB: 1928  Age: 80 y.o. Sex: female      Admit Date: 1/13/2017    Hospital Problem List:     Hospital Problems  Date Reviewed: 11/28/2016          Codes Class Noted POA    * (Principal)CHF (congestive heart failure) ([facility] ICD-10-CM: I50.9  ICD-9-CM: 428.0  1/14/2017 Unknown        Respiratory distress ICD-10-CM: R06.00  ICD-9-CM: 786.09  1/13/2017 Unknown            1. Acute on chronic systolic heart failure. . She has chronic NYHA class III symptoms. She states she has not been taking her Bumex regularly as instructed by her nephrologist. Improving with IV Bumex  2. Ischemic cardiomyopathy. Ejection fraction historically in the 20-25% range. Last assessed in 2010  3. Mitral valve regurgitation, status post annuloplasty ring. No pronounced murmur on exam today. 4. Coronary disease. History of LIMA to LAD, and bare-metal stenting to her RCA. Symptoms do not sound suspicious for angina. Her cardiac markers are unremarkable. No new EKG changes. 5. Status post dual-chamber ICD. 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 6. Hypertension. Patient's blood pressure still elevated in the hospital thus far. Imdur added to regimen  7. Chronic kidney disease, stage III. This appears stable. She follows up with her nephrologist.  8. History of angioedema while taking lisinopril. 9. Dyslipidemia. Started on statin this admission. Plan:     -ICD interrogated this morning with normal function. Optivol (fluid levels elevated) as well as pt activity levels down to less than one hour per day for the last month. -Repeat Echo with EF 20% with severe diffuse hypokinesis. Mod MR. No change in LV function from 2010.  -BP improved. On hydral/imdur and coreg.  -Diuresing well. Renal function remains stable over past few days.  Will stop IV bumex and restart PO beginning tomorrow morning.   -Continue asa and statin.   -Continue to replete K. Subjective:     Pt without complaints. Objective:      Patient Vitals for the past 8 hrs:   Temp Pulse Resp BP SpO2   01/16/17 1106 96.5 °F (35.8 °C) 68 20 117/62 100 %   01/16/17 0725 97.9 °F (36.6 °C) 60 20 182/77 100 %   01/16/17 0458 97.7 °F (36.5 °C) 63 16 163/73 100 %         Patient Vitals for the past 96 hrs:   Weight   01/16/17 0458 52.9 kg (116 lb 9.6 oz)   01/15/17 0530 52.3 kg (115 lb 4.8 oz)   01/15/17 0431 52.5 kg (115 lb 12.8 oz)   01/13/17 0826 54 kg (119 lb)         Intake/Output Summary (Last 24 hours) at 01/16/17 1209  Last data filed at 01/16/17 0941   Gross per 24 hour   Intake             1460 ml   Output             2000 ml   Net             -540 ml       Physical Exam:  General:  Awake, alert, oriented  Neck:  supple  Lungs: clear to auscultation bilat without wheezes/rales/rhonchi  Heart:  Reg rate and rhythm  Abdomen:  Nontender, soft  Extremities: no LE edema    Data Review:     Labs: Results:       Chemistry Recent Labs      01/16/17   0411  01/15/17   0546  01/14/17   0345   GLU  90  85  143*   NA  142  142  142   K  3.0*  3.4*  3.7   CL  99*  101  102   CO2  34*  32  30   BUN  29*  26*  24*   CREA  1.90*  1.53*  1.52*   CA  8.5  8.5  9.0   MG  1.6*  1.8  1.8   AGAP  9  9  10   BUCR  15  17  16      CBC w/Diff Recent Labs      01/16/17   0411  01/15/17   0546  01/14/17   0345   WBC  4.7  4.6  4.9   RBC  3.09*  3.12*  3.30*   HGB  9.4*  9.6*  10.3*   HCT  29.0*  29.1*  30.6*   PLT  144  134*  130*   GRANS  60  65  77*   LYMPH  22  21  14*   EOS  2  2  0      Cardiac Enzymes No results found for: CPK, CKMMB, CKMB, RCK3, CKMBT, CKNDX, CKND1, RHONDA, TROPT, TROIQ, LAMBERT, TROPT, TNIPOC, BNP, BNPP   Coagulation No results for input(s): PTP, INR, APTT in the last 72 hours.     No lab exists for component: INREXT    Lipid Panel Lab Results   Component Value Date/Time    Cholesterol, total 183 12/07/2016 04:31 PM    HDL Cholesterol 67 12/07/2016 04:31 PM    LDL,Direct 66 07/29/2010 12:18 PM    LDL, calculated 106 12/07/2016 04:31 PM    VLDL, calculated 10 12/07/2016 04:31 PM    Triglyceride 50 12/07/2016 04:31 PM    CHOL/HDL Ratio 2.7 12/07/2016 04:31 PM      BNP No results found for: BNP, BNPP, XBNPT   Liver Enzymes No results for input(s): TP, ALB, TBIL, AP, SGOT, GPT in the last 72 hours.     No lab exists for component: DBIL   Digoxin    Thyroid Studies Lab Results   Component Value Date/Time    TSH 2.53 01/13/2017 11:35 AM            Signed By: ANNALISA Em     January 16, 2017

## 2017-01-16 NOTE — INTERDISCIPLINARY ROUNDS
IDR/SLIDR Summary          Patient: Christine Christine MRN: [id number]    Age: 80 y.o. YOB: 1928 Room/Bed: 205/01   Admit Diagnosis: Respiratory distress  Principal Diagnosis: CHF (congestive heart failure) ([facility])   Goals:   Readmission: NO  Quality Measure: Not applicable  VTE Prophylaxis: Chemical  Influenza Vaccine screening completed? YES  Pneumococcal Vaccine screening completed? YES  Mobility needs: No   Nutrition plan:No  Consults:P.T, O.T. and Case Management    Financial concerns:No  Escalated to CM? NO  RRAT Score:    Interventions:Home Health  Testing due for pt today? NO  LOS: 3 days Expected length of stay 2 days  Discharge plan: home   PCP: Noe Newton MD  Transportation needs: No    Days before discharge:two or more days before discharge   Discharge disposition: Home    1/16  Possible d/c in A.M.     Signed:     Lizz Hicks RN  1/16/2017  2:46 PM

## 2017-01-16 NOTE — PROGRESS NOTES
NUTRITION    Nutrition Consult: General Nutrition Management & Supplements     RECOMMENDATIONS / PLAN:     - Change to soft solid diet with chopped meats  - Change supplements to Ensure Enlive TID  - Continue RD inpatient monitoring and evaluation. NUTRITION INTERVENTIONS & DIAGNOSIS:     [x] Meals/Snacks: modify consistency   [x] Medical food supplementation: Ensure Clear TID, change  [x] Vitamin and mineral supplementation: ferrous sulfate, 20 mEq KCl, theragran      Nutrition Diagnosis: Unintentional weight loss related to inadequate energy intake as evidenced by 11 lb, 8% weight loss x 6-9 months PTA. ASSESSMENT:     Subjective: Advance from clear liquids to mechanical soft diet. Pt and granddaughter state food is too soft, not appetizing and patient with variable meal intake. Tolerates soft foods at home and wears dentures. Wants foods that are more solid.    Current Appetite:   [] Good     [] Fair     [x] Poor     [] Other:  Appetite/meal intake prior to admission:   [] Good     [] Fair     [x] Poor for several months     [] Other:    Diet: DIET CARDIAC Mechanical Soft   DIET NUTRITIONAL SUPPLEMENTS     Average po intake adequate to meet patients estimated nutritional needs:   [] Yes     [x] No   [] Unable to determine at this time  Current Meal Intake:   Patient Vitals for the past 100 hrs:   % Diet Eaten   01/16/17 0941 75 %   01/15/17 1826 50 %   01/15/17 1350 100 %   01/15/17 0954 100 %      Food Allergies: NKFA  Feeding Limitations:  [] Swallowing difficulty    [x] Chewing difficulty: dentures, worn and old  [x] Other: tolerates soft foods per patient report    BM:  1/16  Skin Integrity: WDL  Edema: none   Pertinent Medications: Reviewed     Labs:  Recent Labs      01/16/17   0411  01/15/17   0546  01/14/17   0345   NA  142  142  142   K  3.0*  3.4*  3.7   CL  99*  101  102   CO2  34*  32  30   GLU  90  85  143*   BUN  29*  26*  24*   CREA  1.90*  1.53*  1.52*   CA  8.5  8.5  9.0   MG  1.6*  1.8 1.8       Intake/Output Summary (Last 24 hours) at 01/16/17 1432  Last data filed at 01/16/17 0941   Gross per 24 hour   Intake             1220 ml   Output             2000 ml   Net             -780 ml       Anthropometrics:  Ht Readings from Last 1 Encounters:   01/13/17 5' 1\" (1.549 m)     Last 3 Recorded Weights in this Encounter    01/15/17 0431 01/15/17 0530 01/16/17 0458   Weight: 52.5 kg (115 lb 12.8 oz) 52.3 kg (115 lb 4.8 oz) 52.9 kg (116 lb 9.6 oz)     Body mass index is 22.03 kg/(m^2). Weight History: patient reports previous weight of 130 lb and weight loss down to 115 lb over 6-9 months PTA (11 lb, 8% net weight loss)    Weight Metrics 1/16/2017 11/28/2016 11/9/2016 7/12/2016 5/9/2016 11/9/2015 6/15/2015   Weight 116 lb 9.6 oz 119 lb 122 lb 122 lb 125 lb 126 lb 127 lb   BMI 22.03 kg/m2 22.48 kg/m2 23.05 kg/m2 23.06 kg/m2 23.63 kg/m2 23.82 kg/m2 24.01 kg/m2        Admitting Diagnosis: Respiratory distress  Past Medical History   Diagnosis Date    Actinic keratoses     Angioedema 2003     and respiratory failure from reaction to lisinopril    Atypical chest pain     Cardiac cath 02/06/2009     mRCA 100% (overlapping 2.75 x 28 & 2.75 x 12 [employer] BMS). LM patent. mLAD 80%. CX 25%. OM1 100%. LVEDP 28.  EF 45%. Inferobasal akn. MR 3+. Left RA 50%. RA 7.  RV 40/4. PA 37/13. W 18.  CO/CI 5.0/3.2 (TD); 4.0 x 2.6 (Dick).  Cardiac echocardiogram 02/09/2010     EF 15-20%. Gr 2 DFX. Severe, diffuse hykn. Mod MR. LAE. Mod PI. PASP 40. Mild TR.  Cardiac nuclear imaging test, abnormal 09/27/2010     Mod basal inferior, early mid inferior infarction w/o ischemia. Mild mid anterior artifact vs infarction w/o ischemia. Massive LVE. EF 18%. Marked septal, inferior hypk. Inferoapical, inferoseptal dysk. Anterior hypk. RVE suggested.  Cardiovascular ankle brachial index 03/28/2014     No arterial disease at rest bilaterally. R АНДРЕЙ 1.02.  L АНДРЕЙ 1.05.  R DBI 0.48. L DBI 0.64. Exercise deferred.  Cardiovascular lower extremity venous duplex 03/03/2011     No DVT bilaterally. Bilateral puslatile flow c/w increased central venous pressures. Biphasic signals in bilateral posterior tibial arteries. Bilateral lower leg edema.  Carotid duplex 03/28/2014     Mild <50% bilateral ICA plaquing.  Coronary artery disease      Most likely myocardial infarction 12-08; stenting of the RCA and LIMA to LAD with bypass surgery later that year    Dyslipidemia     GERD (gastroesophageal reflux disease)     Heart failure 6/09,2/10,11/10     readmission to hospital 6/09, 2/10, 11/10    Hypertension     Hypothyroidism     Incomplete bundle branch block      left    Intraventricular conduction delay      with QRS duration of 120 msec with incomplete left BBB    Irritable bowel syndrome     Ischemic cardiomyopathy      EF now in the 15% to 20% range, last assessed September 2010 by nuclear stress test.    Mitral regurgitation      moderately severe, s/p mitral valve repair in 05/2009    Osteoarthritis of knee     Pacemaker 10/10     dual-chamber ICD placed, with LV lead placement with current RV pacing worsening mitral regurgitation    Pulmonary hypertension (HCC)      moderate    Renal artery stenosis (HCC)     Respiratory failure ([address] 2003    Rheumatoid [id number](374.0)     Systolic CHF, chronic (HCC)      class III to IV, with multiple recurrent admissions in the past year.     Thrombocytopenia ([address]        Education Needs:        [x] None identified  [] Identified - Not appropriate at this time  []  Identified and addressed - refer to education log  Learning Limitations:   [x] None identified  [] Identified    Cultural, Jainism & ethnic food preferences:  [x] None identified    [] Identified and addressed     ESTIMATED NUTRITION NEEDS:     Calories: 2730-4317 kcal (MSJx1.2-1.3) based on  [x] Actual BW: 54 kg     [] IBW:   CHO: 136-147 gm (50% kcal)   Protein: 43-54 gm (0.8-1 gm/kg) based on  [x] Actual BW: 54 kg     [] IBW:   Fluid: 1 mL/kcal     MONITORING & EVALUATION:     Nutrition Goal(s):  1. Po intake of meals will meet >75% of patient estimated nutritional needs within the next 7 days.   Outcome:  [] Met/Ongoing    [x]  Not Met    [] New/Initial Goal     Monitoring:   [x] Monitor meal intake    [x] Monitor diet tolerance     [x] Monitor supplement intake    Previous Recommendations (for follow-up assessments only):     [x]   Implemented       []   Not Implemented (RD to address)     [] No Recommendation Made     Discharge Planning: cardiac diet, consistency as tolerated   [x] Participated in care planning, discharge planning, & interdisciplinary rounds as appropriate      Rocio Stoll, [address]    Pager: [phone]

## 2017-01-16 NOTE — PROGRESS NOTES
Care Management Interventions  PCP Verified by CM: Yes  Palliative Care Consult (Criteria: CHF and RRAT>21): No  Reason for No Palliative Care Consult:  (Not ordered by Md.)  Transition of Care Consult (CM Consult): Discharge Planning  Health Maintenance Reviewed: Yes  Physical Therapy Consult: Yes  Occupational Therapy Consult: Yes  Speech Therapy Consult: No  Current Support Network: Lives with Spouse  Confirm Follow Up Transport: Family  Plan discussed with Pt/Family/Caregiver: Yes  Discharge Location  Discharge Placement: Home with home health  Pt is alone and sitting in chair in room 205. Pt uses a cane. Pt declines SNF at d/c. Pt's son is Abby Douglas [id number]. Pt states although walking may be some times difficult for her spouse, he is able to drive and he will transport her home at d/c. Pt is planning to return home when discharged. Pt is agreeable to home health if necessary.

## 2017-01-16 NOTE — CARDIO/PULMONARY
Cardiac rehab in to see patient. She was sitting in chair and her granddaughter was by her side. We talked about weighing daily, the importance of taking mediations, following a low fat/low sodium diet, doing some activity daily, signs/symptoms to heart failure and when to call the doctor. We talked about green, yellow and red zones in relation to signs/symptoms. Patient is receptive to the information that was given. We also discussed participating in the outpatient cardiac rehab program. Granddaughter stated that she would like her to participate in some type of exercise program to regain strength and stamina. Will follow throughout her hospital stay.

## 2017-01-16 NOTE — PROGRESS NOTES
Pt's son-Armani Bradley and daughter in law- Rosa Isela Hurdded at bedside. Pt allowed her son and daughter in law to participate in plan. Pt would like to go to ARU at d/c if declined by ARU Pt is agreeable to SNF. FOC given and Pt chose [address] Facility notified via e-discharge. Osmany Martinez from ARU aware.

## 2017-01-16 NOTE — PROGRESS NOTES
Problem: Mobility Impaired (Adult and Pediatric)  Goal: *Acute Goals and Plan of Care (Insert Text)  Physical Therapy Goals  Initiated 1/16/2017 and to be accomplished within 7 day(s)  1. Patient will ambulate with modified independence for >150 feet with the least restrictive device. 2. Patient will ascend/descend 4 stairs with 1 handrail(s) with modified independence. PHYSICAL THERAPY EVALUATION     Patient: Soren Hoang (72 y.o. female)  Date: 1/16/2017  Primary Diagnosis: Respiratory distress        Precautions:   Fall      ASSESSMENT :   Pt is 79yo F admitted to hospital for respiratory distress. Pt is alert and agreeable to co-eval of PT/OT. Pt on 3L O2 NC throughout session, but is not on O2 at home. Pt transferred sit to stand independently then amb 50ft SPC at  before taking a seated rest break. During rest break PT educated pt on proper cane usage and pt attempted to use [facility] again with difficulty coordinating proper use. Pt then trailed RW for 125ft at [facility] and demos improved balance and less assist. Pt then transferred stand to sit into locked bedside chair and was left resting comfortably with call bell bey her side. Pt demos decreased endurance, mobility, and will benefit from skilled intervention to address the above impairments. Once pt is able to navigate 4 steps with B HR, pt [facility].    Patients rehabilitation potential is considered to be Good  Factors which may influence rehabilitation potential include:   [ ]         None noted  [ ]         Mental ability/status  [X]         Medical condition  [X]         Home/family situation and support systems  [ ]         Safety awareness  [X]         Pain tolerance/management  [ ]         Other:        PLAN :  Recommendations and Planned Interventions:  [X]           Bed Mobility Training             [X]    Neuromuscular Re-Education  [X]           Transfer Training                   [ ] Orthotic/Prosthetic Training  [X]           Gait Training                          [ ]    Modalities  [X]           Therapeutic Exercises          [ ]    Edema Management/Control  [X]           Therapeutic Activities            [X]    Patient and Family Training/Education  [ ]           Other (comment):     Frequency/Duration: Patient will be followed by physical therapy 1-2 times per day/4-7 days per week to address goals. Discharge Recommendations: Home Health  Further Equipment Recommendations for Discharge: rolling walker       G-CODES       Mobility  Current  CI= 1-19%   Goal  CI= 1-19%. The severity rating is based on the Level of Assistance required for Functional Mobility and ADLs. SUBJECTIVE:   Patient stated People tell me I do too much at home. But if I do it, then I can sit down and I feel better about it.       OBJECTIVE DATA SUMMARY:       Past Medical History   Diagnosis Date    Actinic keratoses      Angioedema 2003       and respiratory failure from reaction to lisinopril    Atypical chest pain      Cardiac cath 02/06/2009       mRCA 100% (overlapping 2.75 x 28 & 2.75 x 12 Vision BMS). LM patent. mLAD 80%. CX 25%. OM1 100%. LVEDP 28.  EF 45%. Inferobasal akn. MR 3+. Left RA 50%. RA 7.  RV 40/4. PA 37/13. W 18.  CO/CI 5.0/3.2 (TD); 4.0 x 2.6 (Dick).  Cardiac echocardiogram 02/09/2010       EF 15-20%. Gr 2 DFX. Severe, diffuse hykn. Mod MR. LAE. Mod PI. PASP 40. Mild TR.  Cardiac nuclear imaging test, abnormal 09/27/2010       Mod basal inferior, early mid inferior infarction w/o ischemia. Mild mid anterior artifact vs infarction w/o ischemia. Massive LVE. EF 18%. Marked septal, inferior hypk. Inferoapical, inferoseptal dysk. Anterior hypk. RVE suggested.  Cardiovascular ankle brachial index 03/28/2014       No arterial disease at rest bilaterally. R АНДРЕЙ 1.02.  L АНДРЕЙ 1.05.  R DBI 0.48. L DBI 0.64. Exercise deferred.    [address] [place] [address] lower extremity venous duplex 03/03/2011       No DVT bilaterally. Bilateral puslatile flow c/w increased central venous pressures. Biphasic signals in bilateral posterior tibial arteries. Bilateral lower leg edema.  Carotid duplex 03/28/2014       Mild <50% bilateral ICA plaquing.  Coronary artery disease         Most likely myocardial infarction 12-08; stenting of the RCA and LIMA to LAD with bypass surgery later that year    Dyslipidemia      GERD (gastroesophageal reflux disease)      Heart failure 6/09,2/10,11/10       readmission to hospital 6/09, 2/10, 11/10    Hypertension      Hypothyroidism      Incomplete bundle branch block         left    Intraventricular conduction delay         with QRS duration of 120 msec with incomplete left BBB    Irritable bowel syndrome      Ischemic cardiomyopathy         EF now in the 15% to 20% range, last assessed September 2010 by nuclear stress test.    Mitral regurgitation         moderately severe, s/p mitral valve repair in 05/2009    Osteoarthritis of knee      Pacemaker 10/10       dual-chamber ICD placed, with LV lead placement with current RV pacing worsening mitral regurgitation    Pulmonary hypertension (HCC)         moderate    Renal artery stenosis (HCC)      Respiratory failure ([address] 2003    Rheumatoid [id number](009.3)      Systolic CHF, chronic (HCC)         class III to IV, with multiple recurrent admissions in the past year.     Thrombocytopenia ([address]       Past Surgical History   Procedure Laterality Date    Hx hysterectomy        Hx bladder repair        Hx hemorrhoidectomy        Hx heart catheterization   02/09       with stenting of the total occluded right coronary artery    Hx coronary stent placement   2/6/09       totally occluded right coronary artery was stented successfully with 2.75 mm x 28 mm Vision stent overlapping with the 12 mm length Vision stent    Hx pacemaker   10/13/10       dual-chamber Medtronic AICD without left ventricular lead    Hx heart valve surgery           1. Mitral valve repair with size 30 CE Physio ring. 2. Single LIMA to the LAD     Prior Level of Function/Home Situation: Pt lives with  in 1 story home with 4STE with BHR. Pt was independent with IADL's and used SPC for mobility when she went out of the house. Home Situation  Home Environment: Private residence  # Steps to Enter: 3  One/Two Story Residence: One story  Living Alone: No  Support Systems: Spouse/Significant Other/Partner, Child(vinnie)  Patient Expects to be Discharged to[de-identified] Private residence  Current DME Used/Available at Home: Ruth Dobson, straight, Belita Gallop, rollator, Shower chair  Tub or Shower Type: Tub/Shower combination  Critical Behavior:  Neurologic State: Alert  Orientation Level: Oriented X4  Cognition: Follows commands   Psychosocial  Patient Behaviors: Calm; Cooperative  Family  Behaviors: Cooperative  Purposeful Interaction: Yes  Pt Identified Daily Priority: Clinical issues (comment)  Caritas Process: Nurture loving kindness;Nurture spiritual self;Teaching/learning; Attend basic human needs;Create healing environment  Caring Interventions: Reassure; Therapeutic modalities  Reassure: Therapeutic listening; Informing;Quiet presence;Caring rounds  Therapeutic Modalities: Intentional therapeutic touch   Family  Behaviors: Cooperative   Strength:    Strength: Generally decreased, functional (BLE 4/5)   Tone & Sensation:   Tone: Normal (BLE)   Sensation: Intact (BLE to LT)   Range Of Motion:  AROM: Within functional limits (BLE)   Functional Mobility:  Bed Mobility:   Scooting: Independent  Transfers:  Sit to Stand: Supervision  Stand to Sit: Supervision      Balance:   Sitting: Intact  Standing: Intact; With support  Ambulation/Gait Training:  Distance (ft): 175 Feet (ft) (seated rest break FCI)  Assistive Device: Cane, straight;Walker, rolling  Ambulation - Level of Assistance: Modified independent;Contact guard assistance (Mod I with RW, CG with SPC)    Gait Abnormalities: Decreased step clearance;Trunk sway increased   Base of Support: Narrowed   Speed/Diane: Slow   Interventions: Manual cues; Verbal cues; Visual/Demos   Pain:  Pt reports 0/10 pain or discomfort prior to treatment. Pt reports 0/10 pain or discomfort post treatment. Activity Tolerance:   Pt required seated rest break with ambulation to catch her breath but reports this is much better than when she initially came into the hospital.   Please refer to the flowsheet for vital signs taken during this treatment. After treatment:   [X]         Patient left in no apparent distress sitting up in chair  [ ]         Patient left in no apparent distress in bed  [X]         Call bell left within reach  [ ]         Nursing notified  [ ]         Caregiver present  [ ]         Bed alarm activated      COMMUNICATION/EDUCATION:   [X]         Fall prevention education was provided and the patient/caregiver indicated understanding. [X]         Patient/family have participated as able in goal setting and plan of care. [X]         Patient/family agree to work toward stated goals and plan of care. [ ]         Patient understands intent and goals of therapy, but is neutral about his/her participation. [ ]         Patient is unable to participate in goal setting and plan of care.      Thank you for this referral.  Bandar Johnson, PT   Time Calculation: 27 mins

## 2017-01-16 NOTE — ROUTINE PROCESS
Bedside and Verbal shift change report given to Checo RN (oncoming nurse) by Shiloh Kenney RN (offgoing nurse). Report included the following information SBAR, Kardex, MAR and Recent Results. SITUATION:    Code Status: DNR   Reason for Admission: Respiratory distress    [facility] day: 3   Problem List:       Hospital Problems  Date Reviewed: 11/28/2016          Codes Class Noted POA    * (Principal)CHF (congestive heart failure) ([facility] ICD-10-CM: I50.9  ICD-9-CM: 428.0  1/14/2017 Unknown        Respiratory distress ICD-10-CM: R06.00  ICD-9-CM: 786.09  1/13/2017 Unknown              BACKGROUND:    Past Medical History:   Past Medical History   Diagnosis Date    Actinic keratoses     Angioedema 2003     and respiratory failure from reaction to lisinopril    Atypical chest pain     Cardiac cath 02/06/2009     mRCA 100% (overlapping 2.75 x 28 & 2.75 x 12 Vision BMS). LM patent. mLAD 80%. CX 25%. OM1 100%. LVEDP 28.  EF 45%. Inferobasal akn. MR 3+. Left RA 50%. RA 7.  RV 40/4. PA 37/13. W 18.  CO/CI 5.0/3.2 (TD); 4.0 x 2.6 (Dick).  Cardiac echocardiogram 02/09/2010     EF 15-20%. Gr 2 DFX. Severe, diffuse hykn. Mod MR. LAE. Mod PI. PASP 40. Mild TR.  Cardiac nuclear imaging test, abnormal 09/27/2010     Mod basal inferior, early mid inferior infarction w/o ischemia. Mild mid anterior artifact vs infarction w/o ischemia. Massive LVE. EF 18%. Marked septal, inferior hypk. Inferoapical, inferoseptal dysk. Anterior hypk. RVE suggested.  Cardiovascular ankle brachial index 03/28/2014     No arterial disease at rest bilaterally. R АНДРЕЙ 1.02.  L АНДРЕЙ 1.05.  R DBI 0.48. L DBI 0.64. Exercise deferred.  Cardiovascular lower extremity venous duplex 03/03/2011     No DVT bilaterally. Bilateral puslatile flow c/w increased central venous pressures. Biphasic signals in bilateral posterior tibial arteries. Bilateral lower leg edema.     Carotid duplex 03/28/2014     Mild <50% bilateral ICA plaquing.  Coronary artery disease      Most likely myocardial infarction 12-08; stenting of the RCA and LIMA to LAD with bypass surgery later that year    Dyslipidemia     GERD (gastroesophageal reflux disease)     Heart failure 6/09,2/10,11/10     readmission to hospital 6/09, 2/10, 11/10    Hypertension     Hypothyroidism     Incomplete bundle branch block      left    Intraventricular conduction delay      with QRS duration of 120 msec with incomplete left BBB    Irritable bowel syndrome     Ischemic cardiomyopathy      EF now in the 15% to 20% range, last assessed September 2010 by nuclear stress test.    Mitral regurgitation      moderately severe, s/p mitral valve repair in 05/2009    Osteoarthritis of knee     Pacemaker 10/10     dual-chamber ICD placed, with LV lead placement with current RV pacing worsening mitral regurgitation    Pulmonary hypertension (HCC)      moderate    Renal artery stenosis (HCC)     Respiratory failure ([address] 2003    Rheumatoid [id number](564.9)     Systolic CHF, chronic (HCC)      class III to IV, with multiple recurrent admissions in the past year.     Thrombocytopenia ([address]          Patient taking anticoagulants yes    ASSESSMENT:    Changes in Assessment Throughout Shift:      Patient has Central Line: no Reasons if yes:    Patient has Bella Cath: no Reasons if yes:       Last Vitals:     Vitals:    01/16/17 0458 01/16/17 0725 01/16/17 1106 01/16/17 1505   BP: 163/73 182/77 117/62 139/64   Pulse: 63 60 68 63   Resp: 16 20 20 20   Temp: 97.7 °F (36.5 °C) 97.9 °F (36.6 °C) 96.5 °F (35.8 °C) 97.5 °F (36.4 °C)   SpO2: 100% 100% 100% 100%   Weight: 52.9 kg (116 lb 9.6 oz)      Height:            IV and DRAINS (will only show if present)   Peripheral IV 01/15/17 Right Hand-Site Assessment: Clean, dry, & intact  [REMOVED] Peripheral IV 01/13/17 Right Antecubital-Site Assessment: Clean, dry, & intact     WOUND (if present)   Wound Type: none   Dressing present Dressing Present : No   Wound Concerns/Notes:  none     PAIN    Pain Assessment    Pain Intensity 1: 0 (01/16/17 1600)    Pain Location 1: Chest         Patient Stated Pain Goal: 0  o Interventions for Pain:  none  o Intervention effective: no  o Time of last intervention:    o Reassessment Completed: yes      Last 3 Weights:  Last 3 Recorded Weights in this Encounter    01/15/17 0431 01/15/17 0530 01/16/17 0458   Weight: 52.5 kg (115 lb 12.8 oz) 52.3 kg (115 lb 4.8 oz) 52.9 kg (116 lb 9.6 oz)     Weight change: 0.363 kg (12.8 oz)     INTAKE/OUPUT    Current Shift: 01/16 0701 - 01/16 1900  In: 390 [P.O.:240; I.V.:150]  Out: 200 [Urine:200]    Last three shifts: 01/14 1901 - 01/16 0700  In: 1460 [P.O.:960; I.V.:500]  Out: 3000 [Urine:3000]     LAB RESULTS     Recent Labs      01/16/17   0411  01/15/17   0546  01/14/17   0345   WBC  4.7  4.6  4.9   HGB  9.4*  9.6*  10.3*   HCT  29.0*  29.1*  30.6*   PLT  144  134*  130*        Recent Labs      01/16/17   1455  01/16/17   0411  01/15/17   0546  01/14/17   0345   NA   --   142  142  142   K  4.2  3.0*  3.4*  3.7   GLU   --   90  85  143*   BUN   --   29*  26*  24*   CREA   --   1.90*  1.53*  1.52*   CA   --   8.5  8.5  9.0   MG   --   1.6*  1.8  1.8       RECOMMENDATIONS AND DISCHARGE PLANNING     1. Pending tests/procedures/ Plan of Care or Other Needs:      2. Discharge plan for patient and Needs/Barriers: home with PT/OT in a.m    3. Estimated Discharge Date: 01/17 Posted on Whiteboard in [facility]: yes      4. The patient's care plan was reviewed with the oncoming nurse. \"HEALS\" SAFETY CHECK      Fall Risk    Total Score: 1    Safety Measures: Safety Measures: Bed/Chair-Wheels locked, Bed in low position, Call light within reach, Fall prevention (comment), Side rails X 3    A safety check occurred in the patient's room between off going nurse and oncoming nurse listed above.     The safety check included the below items  Area Items   H  High Alert Medications - Verify all high alert medication drips (heparin, PCA, etc.)   E  Equipment - Suction is set up for ALL patients (with michela)  - Red plugs utilized for all equipment (IV pumps, etc.)  - WOWs wiped down at end of shift.  - Room stocked with oxygen, suction, and other unit-specific supplies   A  Alarms - Bed alarm is set for fall risk patients  - Ensure chair alarm is in place and activated if patient is up in a chair   L  Lines - Check IV for any infiltration  - Bella bag is empty if patient has a Bella   - Tubing and IV bags are labeled   S  Safety   - Room is clean, patient is clean, and equipment is clean. - Hallways are clear from equipment besides carts. - Fall bracelet on for fall risk patients  - Ensure room is clear and free of clutter  - Suction is set up for ALL patients (with michela)  - Hallways are clear from equipment besides carts.    - Isolation precautions followed, supplies available outside room, sign posted     Gian Boone RN

## 2017-01-16 NOTE — PROGRESS NOTES
Problem: Self Care Deficits Care Plan (Adult)  Goal: *Acute Goals and Plan of Care (Insert Text)  Outcome: Resolved/Met Date Met:  01/16/17  OCCUPATIONAL THERAPY EVALUATION/DISCHARGE     Patient: Reymundo Pereira (89 y.o. female)  Date: 1/16/2017  Primary Diagnosis: Respiratory distress        Precautions:   Fall      ASSESSMENT AND RECOMMENDATIONS:  Based on the objective data described below, the patient needed supervision with functional mobility with a rolling walker and with self care tasks. Patient had WFL BUE AROM and strength. Patient needed supervision with simulated toilet transfer. Patient noted to get SOB during functional mobility with O2 donned; educated patient on deep breathing and energy conservation techniques to apply to self care tasks at home; patient reported understanding and breathing decreased with additional time and rest breaks. Patient left comfortable in no distress in chair. Skilled acute care occupational therapy is not indicated at this time. Discharge Recommendations: Home Health  Further Equipment Recommendations for Discharge: N/A       COMPLEXITY      Eval Complexity: History: LOW Complexity : Brief history review ; Examination: LOW Complexity : 1-3 performance deficits relating to physical, cognitive , or psychosocial skils that result in activity limitations and / or participation restrictions ; Decision Making:LOW Complexity : No comorbidities that affect functional and no verbal or physical assistance needed to complete eval tasks  Assessment: Low Complexity          G-CODES:      Self Care  Current  CI= 1-19%   Goal  CI= 1-19%   D/C  CI= 1-19%. The severity rating is based on the Level of Assistance required for Functional Mobility and ADLs. SUBJECTIVE:   Patient stated I'm a little winded.       OBJECTIVE DATA SUMMARY:       Past Medical History   Diagnosis Date    Actinic keratoses      Angioedema 2003       and respiratory failure from reaction to lisinopril    Atypical chest pain      Cardiac cath 02/06/2009       mRCA 100% (overlapping 2.75 x 28 & 2.75 x 12 Vision BMS). LM patent. mLAD 80%. CX 25%. OM1 100%. LVEDP 28.  EF 45%. Inferobasal akn. MR 3+. Left RA 50%. RA 7.  RV 40/4. PA 37/13. W 18.  CO/CI 5.0/3.2 (TD); 4.0 x 2.6 (Dick).  Cardiac echocardiogram 02/09/2010       EF 15-20%. Gr 2 DFX. Severe, diffuse hykn. Mod MR. LAE. Mod PI. PASP 40. Mild TR.  Cardiac nuclear imaging test, abnormal 09/27/2010       Mod basal inferior, early mid inferior infarction w/o ischemia. Mild mid anterior artifact vs infarction w/o ischemia. Massive LVE. EF 18%. Marked septal, inferior hypk. Inferoapical, inferoseptal dysk. Anterior hypk. RVE suggested.  Cardiovascular ankle brachial index 03/28/2014       No arterial disease at rest bilaterally. R АНДРЕЙ 1.02.  L АНДРЕЙ 1.05.  R DBI 0.48. L DBI 0.64. Exercise deferred.  Cardiovascular lower extremity venous duplex 03/03/2011       No DVT bilaterally. Bilateral puslatile flow c/w increased central venous pressures. Biphasic signals in bilateral posterior tibial arteries. Bilateral lower leg edema.  Carotid duplex 03/28/2014       Mild <50% bilateral ICA plaquing.       Coronary artery disease         Most likely myocardial infarction 12-08; stenting of the RCA and LIMA to LAD with bypass surgery later that year    Dyslipidemia      GERD (gastroesophageal reflux disease)      Heart failure 6/09,2/10,11/10       readmission to hospital 6/09, 2/10, 11/10    Hypertension      Hypothyroidism      Incomplete bundle branch block         left    Intraventricular conduction delay         with QRS duration of 120 msec with incomplete left BBB    Irritable bowel syndrome      Ischemic cardiomyopathy         EF now in the 15% to 20% range, last assessed September 2010 by nuclear stress test.    Mitral regurgitation         moderately severe, s/p mitral valve repair in 05/2009  Osteoarthritis of knee      Pacemaker 10/10       dual-chamber ICD placed, with LV lead placement with current RV pacing worsening mitral regurgitation    Pulmonary hypertension (HCC)         moderate    Renal artery stenosis (HCC)      Respiratory failure ([address] 2003    Rheumatoid [id number](240.1)      Systolic CHF, chronic (HCC)         class III to IV, with multiple recurrent admissions in the past year.  Thrombocytopenia ([address]       Past Surgical History   Procedure Laterality Date    Hx hysterectomy        Hx bladder repair        Hx hemorrhoidectomy        Hx heart catheterization   02/09       with stenting of the total occluded right coronary artery    Hx coronary stent placement   2/6/09       totally occluded right coronary artery was stented successfully with 2.75 mm x 28 mm Vision stent overlapping with the 12 mm length Vision stent    Hx pacemaker   10/13/10       dual-chamber Medtronic AICD without left ventricular lead    Hx heart valve surgery           1. Mitral valve repair with size 30 CE Physio ring. 2. Single LIMA to the LAD     Prior Level of Function/Home Situation: Patient reported she was modified independent in basic self care tasks and functional mobility PTA.   Home Situation  Home Environment: Private residence  # Steps to Enter: 3  One/Two Story Residence: One story  Living Alone: No  Support Systems: Spouse/Significant Other/Partner, Child(vinnie)  Patient Expects to be Discharged to[de-identified] Private residence  Current DME Used/Available at Home: allan Odell, Braden Canavan, elinorator, [address] [facility] Luis chair  Tub or Shower Type: Tub/Shower combination  [X]     Right hand dominant       [ ]     Left hand dominant  Cognitive/Behavioral Status:  Neurologic State: Alert  Orientation Level: Oriented X4  Cognition: Follows commands     Skin: No skin changes noted      Edema: No edema noted      Vision/Perceptual:       Acuity: Within Defined Limits       Coordination:  Coordination: Within functional limits (BUEs)     Balance:  Sitting: Intact  Standing: Intact; With support (rolling walker)      Strength:  Strength: Generally decreased, functional (BUEs, 4/5 RUE, 4-/5 LUE)      Tone & Sensation:  Tone: Normal (BUEs)  Sensation: Intact (BUEs)      Range of Motion:  AROM: Within functional limits (BUEs)      Functional Mobility and Transfers for ADLs:  Bed Mobility:    Patient in chair upon arrival  Scooting: Independent  Transfers:  Sit to Stand: Supervision              Toilet Transfer : Supervision (with rolling walker)                             ADL Assessment:  Feeding: Independent     Oral Facial Hygiene/Grooming: Supervision     Bathing: Supervision     Upper Body Dressing: Independent     Lower Body Dressing: Supervision     Toileting: Supervision     Pain:  Pt reports 0/10 pain or discomfort prior to treatment. Pt reports 0/10 pain or discomfort post treatment. Activity Tolerance:  Fair     Please refer to the flowsheet for vital signs taken during this treatment. After treatment:   [X]  Patient left in no apparent distress sitting up in chair  [ ]  Patient left in no apparent distress in bed  [X]  Call bell left within reach  [X]  Nursing notified  [ ]  Caregiver present  [ ]  Bed alarm activated      COMMUNICATION/EDUCATION:   Communication/Collaboration:  [X]      Home safety education was provided and the patient/caregiver indicated understanding. [X]      Patient/family have participated as able and agree with findings and recommendations. [ ]      Patient is unable to participate in plan of care at this time.      THEODORE Cazares/L  Time Calculation: 25 mins

## 2017-01-16 NOTE — NURSE NAVIGATOR
Cardiac Nurse Navigator Initial Note       Date of  Admission: 1/13/2017 10:21 AM   Hospital Day: 3    Admission type:Emergency      Patient Active Problem List    Diagnosis Date Noted    CHF (congestive heart failure) ([facility] [address] 01/14/2017    Respiratory distress 01/13/2017    Palpitation 11/28/2016    Vertigo 11/20/2012    Angina of effort ([facility] [address] 10/27/2011    Biventricular ICD (implantable cardiac defibrillator) in place,no LV lead 09/13/2011    Ischemic cardiomyopathy, CABG X1 LIMA to the LAD 2009/EF 25%     Thrombocytopenia ([facility] [address]     Two vessel coronary artery [id number][562.85]     Systolic CHF, chronic,class III-IV     Pulmonary hypertension ([facility] [address]     Hypertension     Angioedema     Mitral regurgitation, s/p MV repair # 30 Physio Ring     Incomplete bundle branch block     Intraventricular conduction delay     Dyspnea     Leg edema       Patricia Mcfarlane MD    Cardiologist: Fer Kothari    Past Medical History   Diagnosis Date    Actinic keratoses     Angioedema 2003     and respiratory failure from reaction to lisinopril    Atypical chest pain     Cardiac cath 02/06/2009     mRCA 100% (overlapping 2.75 x 28 & 2.75 x 12 Vision BMS). LM patent. mLAD 80%. CX 25%. OM1 100%. LVEDP 28.  EF 45%. Inferobasal akn. MR 3+. Left RA 50%. RA 7.  RV 40/4. PA 37/13. W 18.  CO/CI 5.0/3.2 (TD); 4.0 x 2.6 (Dick).  Cardiac echocardiogram 02/09/2010     EF 15-20%. Gr 2 DFX. Severe, diffuse hykn. Mod MR. LAE. Mod PI. PASP 40. Mild TR.  Cardiac nuclear imaging test, abnormal 09/27/2010     Mod basal inferior, early mid inferior infarction w/o ischemia. Mild mid anterior artifact vs infarction w/o ischemia. Massive LVE. EF 18%. Marked septal, inferior hypk. Inferoapical, inferoseptal dysk. Anterior hypk. RVE suggested.  Cardiovascular ankle brachial index 03/28/2014     No arterial disease at rest bilaterally. R АНДРЕЙ 1.02.  L АНДРЕЙ 1.05.  R DBI 0.48. L DBI 0.64. Exercise deferred.     Cardiovascular lower extremity venous duplex 03/03/2011     No DVT bilaterally. Bilateral puslatile flow c/w increased central venous pressures. Biphasic signals in bilateral posterior tibial arteries. Bilateral lower leg edema.  Carotid duplex 03/28/2014     Mild <50% bilateral ICA plaquing.  Coronary artery disease      Most likely myocardial infarction 12-08; stenting of the RCA and LIMA to LAD with bypass surgery later that year    Dyslipidemia     GERD (gastroesophageal reflux disease)     Heart failure 6/09,2/10,11/10     readmission to hospital 6/09, 2/10, 11/10    Hypertension     Hypothyroidism     Incomplete bundle branch block      left    Intraventricular conduction delay      with QRS duration of 120 msec with incomplete left BBB    Irritable bowel syndrome     Ischemic cardiomyopathy      EF now in the 15% to 20% range, last assessed September 2010 by nuclear stress test.    Mitral regurgitation      moderately severe, s/p mitral valve repair in 05/2009    Osteoarthritis of knee     Pacemaker 10/10     dual-chamber ICD placed, with LV lead placement with current RV pacing worsening mitral regurgitation    Pulmonary hypertension (HCC)      moderate    Renal artery stenosis (HCC)     Respiratory failure ([address] 2003    Rheumatoid NVURPYLAA(547.8)     Systolic CHF, chronic (HCC)      class III to IV, with multiple recurrent admissions in the past year.     Thrombocytopenia ([address]       Past Surgical History   Procedure Laterality Date    Hx hysterectomy      Hx bladder repair      Hx hemorrhoidectomy      Hx heart catheterization  02/09     with stenting of the total occluded right coronary artery    Hx coronary stent placement  2/6/09     totally occluded right coronary artery was stented successfully with 2.75 mm x 28 mm Vision stent overlapping with the 12 mm length Vision stent    Hx pacemaker  10/13/10     dual-chamber Medtronic AICD without left ventricular lead    Hx heart valve surgery       1. Mitral valve repair with size 30 CE Physio ring.   2. Single LIMA to the LAD     Current Facility-Administered Medications   Medication Dose Route Frequency    potassium chloride 10 mEq, lidocaine (PF) (XYLOCAINE) 10 mg/mL (1 %) 1 mL in 0.9% sodium chloride 100 mL IVPB   IntraVENous Q1H    [START ON 1/17/2017] bumetanide (BUMEX) tablet 1 mg  1 mg Oral DAILY    famotidine (PEPCID) tablet 20 mg  20 mg Oral DAILY    hydrALAZINE (APRESOLINE) tablet 25 mg  25 mg Oral TID    therapeutic multivitamin (THERAGRAN) tablet 1 Tab  1 Tab Oral DAILY    isosorbide mononitrate ER (IMDUR) tablet 30 mg  30 mg Oral DAILY    atorvastatin (LIPITOR) tablet 20 mg  20 mg Oral QHS    aspirin chewable tablet 81 mg  81 mg Oral DAILY    levothyroxine (SYNTHROID) tablet 50 mcg  50 mcg Oral ACB    ferrous sulfate tablet 325 mg  1 Tab Oral BID WITH MEALS    Lactobacillus Acidoph & Bulgar (FLORANEX) tablet 2 Tab  2 Tab Oral BID    potassium chloride (K-DUR, KLOR-CON) SR tablet 20 mEq  20 mEq Oral DAILY    heparin (porcine) injection 5,000 Units  5,000 Units SubCUTAneous Q8H    scopolamine (TRANSDERM-SCOP) 1.5 mg  1.5 mg TransDERmal Q72H    carvedilol (COREG) tablet 25 mg  25 mg Oral BID WITH MEALS    cefTRIAXone (ROCEPHIN) 1 g in 0.9% sodium chloride (MBP/ADV) 50 mL MBP  1 g IntraVENous Q24H    doxycycline (VIBRAMYCIN) 100 mg in 0.9% sodium chloride (MBP/ADV) 100 mL IVPB  100 mg IntraVENous Q12H        Prior to admission patient was short of breath    Patient observed sitting up in chair in [facility]    Cardiographics  Patient on Telemetry: Cardiac/Telemetry Monitor On: Yes   Cardiac Rhythm (only for patients on telemetry): Cardiac Rhythm: Normal sinus rhythm (paced beats )    Echocardiogram:  []  None ordered    [] Results Pending       Results:     EF: 20%          Labs:   Recent Results (from the past 24 hour(s))   GLUCOSE, POC    Collection Time: 01/15/17  4:04 PM   Result Value Ref Range    Glucose (POC) 143 (H) 70 - 110 mg/dL   MAGNESIUM    Collection Time: 01/16/17  4:11 AM   Result Value Ref Range    Magnesium 1.6 (L) 1.8 - 2.4 mg/dL   CBC WITH AUTOMATED DIFF    Collection Time: 01/16/17  4:11 AM   Result Value Ref Range    WBC 4.7 4.6 - 13.2 K/uL    RBC 3.09 (L) 4.20 - 5.30 M/uL    HGB 9.4 (L) 12.0 - 16.0 g/dL    HCT 29.0 (L) 35.0 - 45.0 %    MCV 93.9 74.0 - 97.0 FL    MCH 30.4 24.0 - 34.0 PG    MCHC 32.4 31.0 - 37.0 g/dL    RDW 12.5 11.6 - 14.5 %    PLATELET 185 678 - 876 K/uL    MPV 10.0 9.2 - 11.8 FL    NEUTROPHILS 60 40 - 73 %    LYMPHOCYTES 22 21 - 52 %    MONOCYTES 16 (H) 3 - 10 %    EOSINOPHILS 2 0 - 5 %    BASOPHILS 0 0 - 2 %    ABS. NEUTROPHILS 2.8 1.8 - 8.0 K/UL    ABS. LYMPHOCYTES 1.0 0.9 - 3.6 K/UL    ABS. MONOCYTES 0.8 0.05 - 1.2 K/UL    ABS. EOSINOPHILS 0.1 0.0 - 0.4 K/UL    ABS. BASOPHILS 0.0 0.0 - 0.1 K/UL    DF AUTOMATED     METABOLIC PANEL, BASIC    Collection Time: 01/16/17  4:11 AM   Result Value Ref Range    Sodium 142 136 - 145 mmol/L    Potassium 3.0 (L) 3.5 - 5.5 mmol/L    Chloride 99 (L) 100 - 108 mmol/L    CO2 34 (H) 21 - 32 mmol/L    Anion gap 9 3.0 - 18 mmol/L    Glucose 90 74 - 99 mg/dL    BUN 29 (H) 7.0 - 18 MG/DL    Creatinine 1.90 (H) 0.6 - 1.3 MG/DL    BUN/Creatinine ratio 15 12 - 20      GFR est AA 30 (L) >60 ml/min/1.73m2    GFR est non-AA 25 (L) >60 ml/min/1.73m2    Calcium 8.5 8.5 - 10.1 MG/DL     Lab Results   Component Value Date/Time    Hemoglobin A1c 5.6 01/22/2012 02:26 AM       Code Status: DNR    Patient would benefit from:  [x] Cardiac Rehab             [x]Home Health   [] PT  [] Case Management             [] H2H                           [] OT                                    [] Nutrition                              []  Apnea Link                [] 6 minute walk                            []  Outpatient sleep study  [] Other:         [] Palliative Care n/a     Education reinforced, patient and/or family given opportunity to ask questions.  Discussed home health and cardiac rehab with granddaughter. They are interested in possible  care and help in the home. I told them that insurance does not cover it but that CM can give her some resources. Will follow.       Kendal Caputo RN

## 2017-01-17 VITALS
WEIGHT: 117.8 LBS | RESPIRATION RATE: 20 BRPM | BODY MASS INDEX: 22.24 KG/M2 | OXYGEN SATURATION: 98 % | HEIGHT: 61 IN | SYSTOLIC BLOOD PRESSURE: 150 MMHG | DIASTOLIC BLOOD PRESSURE: 61 MMHG | HEART RATE: 64 BPM | TEMPERATURE: 98.5 F

## 2017-01-17 LAB
ANION GAP BLD CALC-SCNC: 9 MMOL/L (ref 3–18)
BASOPHILS # BLD AUTO: 0 K/UL (ref 0–0.1)
BASOPHILS # BLD: 1 % (ref 0–2)
BUN SERPL-MCNC: 24 MG/DL (ref 7–18)
BUN/CREAT SERPL: 17 (ref 12–20)
CALCIUM SERPL-MCNC: 8.6 MG/DL (ref 8.5–10.1)
CHLORIDE SERPL-SCNC: 103 MMOL/L (ref 100–108)
CO2 SERPL-SCNC: 29 MMOL/L (ref 21–32)
CREAT SERPL-MCNC: 1.44 MG/DL (ref 0.6–1.3)
DIFFERENTIAL METHOD BLD: ABNORMAL
EOSINOPHIL # BLD: 0.2 K/UL (ref 0–0.4)
EOSINOPHIL NFR BLD: 5 % (ref 0–5)
ERYTHROCYTE [DISTWIDTH] IN BLOOD BY AUTOMATED COUNT: 12.7 % (ref 11.6–14.5)
GLUCOSE BLD STRIP.AUTO-MCNC: 102 MG/DL (ref 70–110)
GLUCOSE SERPL-MCNC: 82 MG/DL (ref 74–99)
HCT VFR BLD AUTO: 28.3 % (ref 35–45)
HGB BLD-MCNC: 9.2 G/DL (ref 12–16)
LYMPHOCYTES # BLD AUTO: 25 % (ref 21–52)
LYMPHOCYTES # BLD: 0.9 K/UL (ref 0.9–3.6)
MAGNESIUM SERPL-MCNC: 2.2 MG/DL (ref 1.8–2.4)
MCH RBC QN AUTO: 31 PG (ref 24–34)
MCHC RBC AUTO-ENTMCNC: 32.5 G/DL (ref 31–37)
MCV RBC AUTO: 95.3 FL (ref 74–97)
MONOCYTES # BLD: 0.6 K/UL (ref 0.05–1.2)
MONOCYTES NFR BLD AUTO: 15 % (ref 3–10)
NEUTS SEG # BLD: 2 K/UL (ref 1.8–8)
NEUTS SEG NFR BLD AUTO: 54 % (ref 40–73)
PLATELET # BLD AUTO: 142 K/UL (ref 135–420)
PMV BLD AUTO: 9.6 FL (ref 9.2–11.8)
POTASSIUM SERPL-SCNC: 4 MMOL/L (ref 3.5–5.5)
RBC # BLD AUTO: 2.97 M/UL (ref 4.2–5.3)
SODIUM SERPL-SCNC: 141 MMOL/L (ref 136–145)
WBC # BLD AUTO: 3.8 K/UL (ref 4.6–13.2)

## 2017-01-17 PROCEDURE — 74011000250 HC RX REV CODE- 250: Performed by: FAMILY MEDICINE

## 2017-01-17 PROCEDURE — 74011250637 HC RX REV CODE- 250/637: Performed by: FAMILY MEDICINE

## 2017-01-17 PROCEDURE — 82962 GLUCOSE BLOOD TEST: CPT

## 2017-01-17 PROCEDURE — 80048 BASIC METABOLIC PNL TOTAL CA: CPT | Performed by: FAMILY MEDICINE

## 2017-01-17 PROCEDURE — 85025 COMPLETE CBC W/AUTO DIFF WBC: CPT | Performed by: FAMILY MEDICINE

## 2017-01-17 PROCEDURE — 74011000258 HC RX REV CODE- 258: Performed by: FAMILY MEDICINE

## 2017-01-17 PROCEDURE — 74011250637 HC RX REV CODE- 250/637: Performed by: INTERNAL MEDICINE

## 2017-01-17 PROCEDURE — 74011250637 HC RX REV CODE- 250/637: Performed by: PHYSICIAN ASSISTANT

## 2017-01-17 PROCEDURE — 36415 COLL VENOUS BLD VENIPUNCTURE: CPT | Performed by: FAMILY MEDICINE

## 2017-01-17 PROCEDURE — 83735 ASSAY OF MAGNESIUM: CPT | Performed by: FAMILY MEDICINE

## 2017-01-17 PROCEDURE — 74011250636 HC RX REV CODE- 250/636: Performed by: FAMILY MEDICINE

## 2017-01-17 RX ORDER — HYDRALAZINE HYDROCHLORIDE 25 MG/1
25 TABLET, FILM COATED ORAL 3 TIMES DAILY
Qty: 90 TAB | Refills: 0 | Status: SHIPPED | OUTPATIENT
Start: 2017-01-17 | End: 2017-01-01

## 2017-01-17 RX ORDER — ATORVASTATIN CALCIUM 20 MG/1
20 TABLET, FILM COATED ORAL
Qty: 30 TAB | Refills: 0 | Status: SHIPPED | OUTPATIENT
Start: 2017-01-17 | End: 2017-01-01

## 2017-01-17 RX ORDER — DOXYCYCLINE 100 MG/1
100 CAPSULE ORAL 2 TIMES DAILY
Qty: 10 CAP | Refills: 0 | Status: SHIPPED | OUTPATIENT
Start: 2017-01-17 | End: 2017-02-13

## 2017-01-17 RX ORDER — ISOSORBIDE MONONITRATE 30 MG/1
30 TABLET, EXTENDED RELEASE ORAL DAILY
Qty: 30 TAB | Refills: 0 | Status: SHIPPED | OUTPATIENT
Start: 2017-01-17 | End: 2017-03-28 | Stop reason: SDUPTHER

## 2017-01-17 RX ADMIN — HYDRALAZINE HYDROCHLORIDE 25 MG: 25 TABLET, FILM COATED ORAL at 08:23

## 2017-01-17 RX ADMIN — ISOSORBIDE MONONITRATE 30 MG: 30 TABLET, EXTENDED RELEASE ORAL at 08:23

## 2017-01-17 RX ADMIN — Medication 325 MG: at 17:12

## 2017-01-17 RX ADMIN — DOXYCYCLINE 100 MG: 100 INJECTION, POWDER, LYOPHILIZED, FOR SOLUTION INTRAVENOUS at 11:32

## 2017-01-17 RX ADMIN — FAMOTIDINE 20 MG: 20 TABLET ORAL at 08:23

## 2017-01-17 RX ADMIN — ASPIRIN 81 MG 81 MG: 81 TABLET ORAL at 08:23

## 2017-01-17 RX ADMIN — THERA TABS 1 TABLET: TAB at 08:23

## 2017-01-17 RX ADMIN — CARVEDILOL 25 MG: 25 TABLET, FILM COATED ORAL at 17:12

## 2017-01-17 RX ADMIN — HEPARIN SODIUM 5000 UNITS: 5000 INJECTION, SOLUTION INTRAVENOUS; SUBCUTANEOUS at 05:12

## 2017-01-17 RX ADMIN — LEVOTHYROXINE SODIUM 50 MCG: 50 TABLET ORAL at 08:23

## 2017-01-17 RX ADMIN — DOXYCYCLINE 100 MG: 100 INJECTION, POWDER, LYOPHILIZED, FOR SOLUTION INTRAVENOUS at 00:02

## 2017-01-17 RX ADMIN — HYDRALAZINE HYDROCHLORIDE 25 MG: 25 TABLET, FILM COATED ORAL at 17:12

## 2017-01-17 RX ADMIN — BUMETANIDE 1 MG: 1 TABLET ORAL at 08:26

## 2017-01-17 RX ADMIN — CARVEDILOL 25 MG: 25 TABLET, FILM COATED ORAL at 08:23

## 2017-01-17 RX ADMIN — LACTOBACILLUS TAB 2 TABLET: TAB at 17:12

## 2017-01-17 RX ADMIN — POTASSIUM CHLORIDE 20 MEQ: 1500 TABLET, EXTENDED RELEASE ORAL at 08:23

## 2017-01-17 RX ADMIN — LACTOBACILLUS TAB 2 TABLET: TAB at 08:23

## 2017-01-17 RX ADMIN — Medication 325 MG: at 08:23

## 2017-01-17 NOTE — DISCHARGE SUMMARY
Discharge Summary  [facility]      Patient: Winston Tillman Age: 80 y.o. Sex: female  : 1928    MRN: [id number]      DOA: 2017      Discharge Date: 2017      Attending: Maurilio Golden MD; Mallorie Mane MD      PCP: Demetri Ibarra MD        ================================================================    Reason for Admission:   Acute exacerbation of CHF  Community Acquired Pneumonia     Discharge Diagnoses:  1. Acute exacerbation of chronic systolic and diastolic CHF  2. Community Acquired Pneumonia   3. HTN  4. Hx intermittent V- tach, Prolonged QT interval   5. CKD Stage IV  6. Hypothyroidism  7. Normocytic anemia  8. Moderate protein calorie malnutrition      Important notes to PCP/ follow-up studies and evaluations:  - Pt should follow up with Dr. Siva Caceres, [facility], in 2-3 weeks. Discharged on PO Bumex, carvedilol, hydralazine, imdur, ASA, statin, synthroid and K+ supplements per their recommendations. - She also has a scheduled pacer check on 17 at the same office.   - Should receive a BMP within the week. Pending labs and studies:  Blood Cx on 2017     Operative Procedures:   None, although ICD interrogated, normal device function with no arrhythmias. Discharge Medications:     Current Discharge Medication List      START taking these medications    Details   atorvastatin (LIPITOR) 20 mg tablet Take 1 Tab by mouth nightly. Qty: 30 Tab, Refills: 0      hydrALAZINE (APRESOLINE) 25 mg tablet Take 1 Tab by mouth three (3) times daily. Qty: 90 Tab, Refills: 0      isosorbide mononitrate ER (IMDUR) 30 mg tablet Take 1 Tab by mouth daily. Qty: 30 Tab, Refills: 0      doxycycline (MONODOX) 100 mg capsule Take 1 Cap by mouth two (2) times a day.   Qty: 10 Cap, Refills: 0         CONTINUE these medications which have NOT CHANGED    Details   carvedilol (COREG) 25 mg tablet Take 1 Tab by mouth two (2) times daily (with meals). Qty: 60 Tab, Refills: 6      fluticasone (FLONASE) 50 mcg/actuation nasal spray nightly. omeprazole (PRILOSEC) 20 mg capsule Take 20 mg by mouth daily. ferrous sulfate (IRON) 325 mg (65 mg iron) tablet Take  by mouth Daily (before breakfast). calcium citrate-vitamin d3 (CITRACAL + D) 315-200 mg-unit tab Take 1 tablet by mouth daily (with breakfast). flaxseed oil 1,000 mg cap Take  by mouth two (2) times a day. LACTOBACILLUS ACIDOPHILUS (PROBIOTIC PO) Take  by mouth two (2) times a day. 2 tabs bid      BUMETANIDE (BUMEX PO) Take 1 mg by mouth as directed. Take one tablet daily and as directed if weight up 2+ pounds     Associated Diagnoses: Systolic CHF, chronic (HCC)      potassium chloride (K-DUR) 20 mEq tablet Take 20 mEq by mouth. Take 1 tablet in the morning and 0.5 tablet in the evening      CRANBERRY EXT/C/L. SPOROGENES (CRANBERRY-PROBIOTICS-VITAMIN C PO) Take 1,500 mg by mouth daily. aspirin 81 mg tablet Take 81 mg by mouth daily. levothyroxine (LEVOXYL) 50 mcg tablet Take 50 mcg by mouth daily (before breakfast). nitroglycerin (NITROSTAT) 0.4 mg SL tablet 0.4 mg by SubLINGual route every five (5) minutes as needed. Disposition: [facility]    Consultants:    Dr. Cece Jimenez, Cardiology     [address] [facility] Course (including pertinent history and physical findings)  Mario Alberto Love is a 80 y.o. AAF with a  PMH significant for chronic systolic and diastolic CHF, CKD Stage IV, HTN, hypothyroidism, anemia, and arthritis who presented with increasing dyspnea, abdominal pain, and general myalgias and arthralgias. A CXR in the ED showed moderate atelectasis and/or infiltrates in the RLL, and mild atelectasis and/or infiltrates in the  LLL and lingula. Her pro-BNP was greater than 175,000, and a CT showed a R pleural effusion in addition to that of the CXR.   Ms. Yanna Dewitt was treated with azithromycin and rocephin for pneumonia, phenergan for nausea, morphine for pain, and lasix for volume overload. She was then admitted for an exacerbation of CHF along with community acquired pneumonia. Because of her extensive cardiac history which includes a 2010 echocardiogram with an EF of 20%, a dual-chamber AICD in place, previous RCA stents and LIMA to her LAD, mitral valve regurgitation, and non-sustained V-Tach and prolonged QT interval, cardiology was consulted. She was treated with IV Bumex, and hydralazine and imdur were added to her regimen. An echocardiogram was performed which showed an EF of 20% with severe diffuse hypokinesis with no significant change in LV function or mitral regurg. Her ICD was then interrogated which showed normal function. She was placed on O2 for comfort and her chronic diseases were managed with her home medication regimen. Her urine and blood cultures were negative as of her discharge. Mrs. Aries Patino overall condition improved to her baseline over her admission her home bumex regimen was resumed. While she still had some dyspnea on exertion, myalgias and arthralgias at discharge, she stated she had returned to her baseline. With her NYHA Class III symptoms, she could benefit tremendously from physical therapy and cardiac rehab, and was discharged to [facility]. She should complete 5 more days of doxycycline BID, and take the rest of her medications as described above. She should follow up with her cardiologist in 2-3 weeks, and again in February for her pacer check.      Summarized key findings and results (labs, imaging studies, ECHO, cardiac cath, endoscopies, etc):    CBC w/Diff    Lab Results   Component Value Date/Time    WBC 3.8 01/17/2017 02:32 AM    Hemoglobin (POC) 13.6 11/18/2009 08:46 AM    HGB 9.2 01/17/2017 02:32 AM    Hematocrit (POC) 40 11/18/2009 08:46 AM    HCT 28.3 01/17/2017 02:32 AM    PLATELET 572 97/85/5417 02:32 AM    MCV 95.3 01/17/2017 02:32 AM           Chemistry    Lab Results   Component Value Date/Time Sodium 141 01/17/2017 02:32 AM    Potassium 4.0 01/17/2017 02:32 AM    Chloride 103 01/17/2017 02:32 AM    CO2 29 01/17/2017 02:32 AM    Anion gap 9 01/17/2017 02:32 AM    Glucose 82 01/17/2017 02:32 AM    BUN 24 01/17/2017 02:32 AM    Creatinine 1.44 01/17/2017 02:32 AM    BUN/Creatinine ratio 17 01/17/2017 02:32 AM    GFR est AA 42 01/17/2017 02:32 AM    GFR est non-AA 34 01/17/2017 02:32 AM    Calcium 8.6 01/17/2017 02:32 AM           Functional status and cognitive function:    Ambulates with: Ju Pamella and Walker, but also without assistance   Status: alert, cooperative, no distress, appears stated age    Diet:  Cardiac    Code status and advanced care plan: DNR  Point of Contact:  [place] (daughter-in-law) [phone] (c), [phone] (h).       Patient Education:  Patient was educated on the following topics prior to discharge: The importance of taking her medications as prescribed. The importance of following up with her PCP and cardiologist. The benefits she could receive through physical therapy and cardiac rehab. Follow-up:   Follow-up Information     Follow up With Details Comments 2001 Clay Damon MD In 1 week  [facility] [address] [place] [address].      Estefania Haddad MD In 2-3 weeks  [address]  [facility]  Critical access hospital [facility]      Estefania Haddad MD Go to appointment on 2/16/2017  Pacer check  [facility]  [facility]  [place] [address] [address]  [phone]              ================================================================    HAbelardo Denson Poster, DO   [facility]   January 17, 2017

## 2017-01-17 NOTE — ROUTINE PROCESS
2320 Bedside and Verbal shift change  Received from PETTY Delgado , RN (outgoing nurse), to BERNARD Wilkins (oncoming)  Pt. Is AOX 4. IV SL, Pt. denies  pain at this time. Report included the following information SBAR, Kardex, Procedure Summary, Intake/Output, MAR, Recent Lab Results, and  Cardiac Rhythm @ [facility]. Will resume care and monitor Pt. Condition. Pt. Educated on call bell when in need of help and assistance. Pt. verbalized understanding. 0000 Pt. Head to toe Assessment Done and documented. 0130  OOB to [facility] independently. 0300  Pt. Resting in bed with eyes closed, easily awaken. 0500   Pt. Able to rest well throughout the shift.

## 2017-01-17 NOTE — DISCHARGE INSTRUCTIONS
DISCHARGE SUMMARY from Nurse    The following personal items are in your possession at time of discharge:    Dental Appliances: With patient, Lowers, Uppers  Visual Aid: Glasses, At bedside     Home Medications: None  Jewelry: Earrings  Clothing: Pants, Jacket/Coat, Footwear, Sweater, Undergarments, Shirt, With patient  Other Valuables: Cane, At bedside, Keys, Purse             PATIENT INSTRUCTIONS:    After general anesthesia or intravenous sedation, for 24 hours or while taking prescription Narcotics:  · Limit your activities  · Do not drive and operate hazardous machinery  · Do not make important personal or business decisions  · Do  not drink alcoholic beverages  · If you have not urinated within 8 hours after discharge, please contact your surgeon on call. Report the following to your surgeon:  · Excessive pain, swelling, redness or odor of or around the surgical area  · Temperature over 100.5  · Nausea and vomiting lasting longer than 4 hours or if unable to take medications  · Any signs of decreased circulation or nerve impairment to extremity: change in color, persistent  numbness, tingling, coldness or increase pain  · Any questions        What to do at Home:  Recommended activity: Activity as tolerated     If you experience any of the following symptoms increased shortness of breath increased swelling in hands or feet chest pain dizziness or fainting , please follow up with MD or go to ER. *  Please give a list of your current medications to your Primary Care Provider. *  Please update this list whenever your medications are discontinued, doses are      changed, or new medications (including over-the-counter products) are added. *  Please carry medication information at all times in case of emergency situations.           These are general instructions for a healthy lifestyle:    No smoking/ No tobacco products/ Avoid exposure to second hand smoke    Surgeon General's Warning:  Quitting smoking now greatly reduces serious risk to your health. Obesity, smoking, and sedentary lifestyle greatly increases your risk for illness    A healthy diet, regular physical exercise & weight monitoring are important for maintaining a healthy lifestyle    You may be retaining fluid if you have a history of heart failure or if you experience any of the following symptoms:  Weight gain of 3 pounds or more overnight or 5 pounds in a week, increased swelling in our hands or feet or shortness of breath while lying flat in bed. Please call your doctor as soon as you notice any of these symptoms; do not wait until your next office visit. Recognize signs and symptoms of STROKE:    F-face looks uneven    A-arms unable to move or move unevenly    S-speech slurred or non-existent    T-time-call 911 as soon as signs and symptoms begin-DO NOT go       Back to bed or wait to see if you get better-TIME IS BRAIN. Warning Signs of HEART ATTACK     Call 911 if you have these symptoms:   Chest discomfort. Most heart attacks involve discomfort in the center of the chest that lasts more than a few minutes, or that goes away and comes back. It can feel like uncomfortable pressure, squeezing, fullness, or pain.  Discomfort in other areas of the upper body. Symptoms can include pain or discomfort in one or both arms, the back, neck, jaw, or stomach.  Shortness of breath with or without chest discomfort.  Other signs may include breaking out in a cold sweat, nausea, or lightheadedness. Don't wait more than five minutes to call 911 - MINUTES MATTER! Fast action can save your life. Calling 911 is almost always the fastest way to get lifesaving treatment. Emergency Medical Services staff can begin treatment when they arrive -- up to an hour sooner than if someone gets to the hospital by car. The discharge information has been reviewed with the patient. The patient verbalized understanding.     Discharge medications reviewed with the patient and appropriate educational materials and side effects teaching were provided. [employer] Technologies[employer] Activation    Thank you for requesting access to Qmerce. Please follow the instructions below to securely access and download your online medical record. Qmerce allows you to send messages to your doctor, view your test results, renew your prescriptions, schedule appointments, and more. How Do I Sign Up? 1. In your internet browser, go to [email or web address]. 2. Click on the First Time User? Click Here link in the Sign In box. You will see the New Member Sign Up page. 3. Enter your Growl Mediat Access Code exactly as it appears below. You will not need to use this code after youve completed the sign-up process. If you do not sign up before the expiration date, you must request a new code. MyChart Access Code: Activation code not generated  Current Qmerce Status: Patient Declined (This is the date your Growl Mediat access code will )    4. Enter the last four digits of your Social Security Number (xxxx) and Date of Birth (mm/dd/yyyy) as indicated and click Submit. You will be taken to the next sign-up page. 5. Create a Qmerce ID. This will be your Qmerce login ID and cannot be changed, so think of one that is secure and easy to remember. 6. Create a Qmerce password. You can change your password at any time. 7. Enter your Password Reset Question and Answer. This can be used at a later time if you forget your password. 8. Enter your e-mail address. You will receive e-mail notification when new information is available in [address] 9. Click Sign Up. You can now view and download portions of your medical record. 10. Click the Download Summary menu link to download a portable copy of your medical information.     Additional Information    If you have questions, please visit the Frequently Asked Questions section of the Qmerce website at [email or web address] Remember, MyChart is NOT to be used for urgent needs. For medical emergencies, dial 911. Patient discharged without removing armband and transfered to another healthcare acute, sub acute , or extended care facility. Informed of privacy risks if armband lost or stolen                      Cardiac Rehabilitation: Care Instructions  Your Care Instructions    Cardiac rehabilitation is a program for people who have a heart problem, such as a heart attack, heart failure, or a heart valve disease. The program includes exercise, lifestyle changes, education, and emotional support. Cardiac rehab can help you improve the quality of your life through better overall health. It can help you lose weight and feel better about yourself. On your cardiac rehab team, you may have your doctor, a nurse specialist, a dietitian, and a physical therapist. They will design your cardiac rehab program specifically for you. You will learn how to reduce your risk for heart problems, how to manage stress, and how to eat a heart-healthy diet. By the end of the program, you will be ready to maintain a healthier lifestyle on your own. Follow-up care is a key part of your treatment and safety. Be sure to make and go to all appointments, and call your doctor if you are having problems. It's also a good idea to know your test results and keep a list of the medicines you take. How can you care for yourself at home? · Take your medicines exactly as prescribed. Call your doctor if you think you are having a problem with your medicine. You will get more details on the specific medicines your doctor prescribes. · Weigh yourself every day if your doctor tells you to. Watch for sudden weight gain. Weigh yourself on the same scale with the same amount of clothing at the same time of day. · Plan your meals so that you are eating heart-healthy foods. ¨ Eat a variety of foods daily.  Fresh fruits and vegetables and whole-grains are good choices. ¨ Limit your fat intake, especially saturated and trans fat. ¨ Limit salt (sodium). ¨ Increase fiber in your diet. ¨ Limit alcohol. · Learn how to take your pulse so that you can track your heart rate during exercise. · Always check with your doctor before you begin a new exercise program.  · Warm up before you exercise and cool down afterward for at least 15 minutes each. This will help your heart gradually prepare for and recover from exercise and avoid pushing your heart too hard. · Stop exercising if you have any unusual discomfort, such as chest pain. · Do not smoke. Smoking can make heart problems worse. If you need help quitting, talk to your doctor about stop-smoking programs and medicines. These can increase your chances of quitting for good. When should you call for help? Call 911 anytime you think you may need emergency care. For example, call if:  · You have severe trouble breathing. · You cough up pink, foamy mucus and you have trouble breathing. · You have symptoms of a heart attack. These may include:  ¨ Chest pain or pressure, or a strange feeling in the chest.  ¨ Sweating. ¨ Shortness of breath. ¨ Nausea or vomiting. ¨ Pain, pressure, or a strange feeling in the back, neck, jaw, or upper belly or in one or both shoulders or arms. ¨ Lightheadedness or sudden weakness. ¨ A fast or irregular heartbeat. After you call 911, the  may tell you to chew 1 adult-strength or 2 to 4 low-dose aspirin. Wait for an ambulance. Do not try to drive yourself. · You have angina symptoms (such as chest pain or pressure) that do not go away with rest or are not getting better within 5 minutes after you take a dose of nitroglycerin. · You have symptoms of a stroke. These may include:  ¨ Sudden numbness, tingling, weakness, or loss of movement in your face, arm, or leg, especially on only one side of your body. ¨ Sudden vision changes.   ¨ Sudden trouble speaking. ¨ Sudden confusion or trouble understanding simple statements. ¨ Sudden problems with walking or balance. ¨ A sudden, severe headache that is different from past headaches. · You passed out (lost consciousness). Call your doctor now or seek immediate medical care if:  · You have new or increased shortness of breath. · You are dizzy or lightheaded, or you feel like you may faint. · You gain weight suddenly, such as 3 pounds or more in 2 to 3 days. (Your doctor may suggest a different range of weight gain.)  · You have increased swelling in your legs, ankles, or feet. Watch closely for changes in your health, and be sure to contact your doctor if you have any problems. Where can you learn more? Go to http://cosme-urszula.info/. Enter W925 in the search box to learn more about \"Cardiac Rehabilitation: Care Instructions. \"  Current as of: May 5, 2016  Content Version: 11.1  © 3806-8791 [employer]. Care instructions adapted under license by [employer] (which disclaims liability or warranty for this information). If you have questions about a medical condition or this instruction, always ask your healthcare professional. [address] any warranty or liability for your use of this information. Learning About Saving Energy When You Have a Chronic Condition  Introduction  Everyday tasks can be tiring when you have COPD, heart failure, or another long-term (chronic) condition. You may feel at times that you've lost your ability to live your life. But learning to conserve, or save, your energy can help you be less tired. Conserving your energy means finding ways of doing daily activities with as little effort as possible. With some small changes in the way you do things, you can get your tasks done more easily. Some treatments are available that might help. Pulmonary rehabilitation can teach you ways to breathe easier.  Cardiac rehabilitation can help make your heart stronger. You also may want to see an occupational or physical therapist. The therapist can give you more tips on building strength and moving with less effort. What can you do to conserve your energy? Planning  · Make a list of what you have to do every day. Group the tasks by location. · Do all the chores in one part of your house around the same time. · Go out for errands or do chores at the time of day when you have the most energy. · Plan rest periods into your day. Getting things done  · Sit down as often as you can when you get dressed, do chores, or cook. · Use a cart with wheels to roll items, such as laundry, from one room to another. · Push or slide boxes or other large items instead of lifting them. Reaching and bending  · Put things you use the most on shelves that are at the level of your waist or shoulder. · Use long-handled grabbers or other tools to reach items on a high shelf or to  things off the floor. Use long-handled dusters when you clean the house. · Use a raised toilet seat to avoid bending too far to sit or stand up. Eating  · Eat several small meals instead of three larger meals. · If you get too tired to eat much, try to choose healthy foods that have more calories. Have a yogurt-and-fruit smoothie for breakfast. Put avocado on a sandwich. Or add cheese or peanut butter to snacks. · If you don't feel very hungry, try to eat first and drink water or other fluids later, after a meal. This can help keep you from losing weight. Sip small amounts of fluids if you need to drink while you eat. Having sex  · Choose the time of day when you have more energy. · A eunk-ri-mozp position for sex can be less tiring. Sometimes you may want to focus more on caressing. Watch closely for changes in your health, and be sure to contact your doctor if you have any problems. Where can you learn more?   Go to http://cosme-urszula.info/. Enter H190 in the search box to learn more about \"Learning About Saving Energy When You Have a Chronic Condition. \"  Current as of: May 23, 2016  Content Version: 11.1  © 7809-0067 [employer]genica. Care instructions adapted under license by [employer] (which disclaims liability or warranty for this information). If you have questions about a medical condition or this instruction, always ask your healthcare professional. [address] any warranty or liability for your use of this information. Dehydration: Care Instructions  Your Care Instructions  Dehydration happens when your body loses too much fluid. This might happen when you do not drink enough water or you lose large amounts of fluids from your body because of diarrhea, vomiting, or sweating. Severe dehydration can be life-threatening. Water and minerals called electrolytes help put your body fluids back in balance. Learn the early signs of fluid loss, and drink more fluids to prevent dehydration. Follow-up care is a key part of your treatment and safety. Be sure to make and go to all appointments, and call your doctor if you are having problems. It's also a good idea to know your test results and keep a list of the medicines you take. How can you care for yourself at home? · To prevent dehydration, drink plenty of fluids, enough so that your urine is light yellow or clear like water. Choose water and other caffeine-free clear liquids until you feel better. If you have kidney, heart, or liver disease and have to limit fluids, talk with your doctor before you increase the amount of fluids you drink. · If you do not feel like eating or drinking, try taking small sips of water, sports drinks, or other rehydration drinks.   · Get plenty of rest.  To prevent dehydration  · Add more fluids to your diet and daily routine, unless your doctor has told you not to.  · During hot weather, drink more fluids. Drink even more fluids if you exercise a lot. Stay away from drinks with alcohol or caffeine. · Watch for the symptoms of dehydration. These include:  ¨ A dry, sticky mouth. ¨ Dark yellow urine, and not much of it. ¨ Dry and sunken eyes. ¨ Feeling very tired. · Learn what problems can lead to dehydration. These include:  ¨ Diarrhea, fever, and vomiting. ¨ Any illness with a fever, such as pneumonia or the flu. ¨ Activities that cause heavy sweating, such as endurance races and heavy outdoor work in hot or humid weather. ¨ Alcohol or drug abuse or withdrawal.  ¨ Certain medicines, such as cold and allergy pills (antihistamines), diet pills (diuretics), and laxatives. ¨ Certain diseases, such as diabetes, cancer, and heart or kidney disease. When should you call for help? Call 911 anytime you think you may need emergency care. For example, call if:  · You passed out (lost consciousness). Call your doctor now or seek immediate medical care if:  · You are confused and cannot think clearly. · You are dizzy or lightheaded, or you feel like you may faint. · You have signs of needing more fluids. You have sunken eyes and a dry mouth, and you pass only a little dark urine. · You cannot keep fluids down. Watch closely for changes in your health, and be sure to contact your doctor if:  · You are not making tears. · Your skin is very dry and sags slowly back into place after you pinch it. · Your mouth and eyes are very dry. Where can you learn more? Go to http://cosme-urszula.info/. Enter Q469 in the search box to learn more about \"Dehydration: Care Instructions. \"  Current as of: May 27, 2016  Content Version: 11.1  © 7387-3190 "[employer]. Care instructions adapted under license by [employer] (which disclaims liability or warranty for this information).  If you have questions about a medical condition or this instruction, always ask your healthcare professional. [place] [address] any warranty or liability for your use of this information.

## 2017-01-17 NOTE — PROGRESS NOTES
[facility]  -  Progress Note      Patient: Hafsa Rodrigues MRN: [id number]  SSN: [id number]    YOB: 1928  Age: 80 y.o. Sex: female      Admit Date: 1/13/2017    Hospital Problem List:     Hospital Problems  Date Reviewed: 11/28/2016          Codes Class Noted POA    * (Principal)CHF (congestive heart failure) ([facility].) ICD-10-CM: I50.9  ICD-9-CM: 428.0  1/14/2017 Unknown        Respiratory distress ICD-10-CM: R06.00  ICD-9-CM: 786.09  1/13/2017 Unknown            1. Acute on chronic systolic heart failure. . She has chronic NYHA class III symptoms. She states she has not been taking her Bumex regularly as instructed by her nephrologist. Improved with IV Bumex, back on PO.  2. Ischemic cardiomyopathy. Ejection fraction historically in the 20-25% range. Last assessed in 2010  3. Mitral valve regurgitation, status post annuloplasty ring. No pronounced murmur on exam today. 4. Coronary disease. History of LIMA to LAD, and bare-metal stenting to her RCA. Symptoms do not sound suspicious for angina. Her cardiac markers are unremarkable. No new EKG changes. 5. Status post dual-chamber ICD. 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 6. Hypertension. Patient's blood pressure still elevated in the hospital thus far. Imdur added to regimen  7. Chronic kidney disease, stage III. This appears stable. She follows up with her nephrologist.  8. History of angioedema while taking lisinopril. 9. Dyslipidemia. Started on statin this admission. Plan:     -Continue PO Bumex. Renal function stable. -Continue coreg, hydral/imdur, asa, statin, synthroid and K supps upon discharge.   -Encouraged compliance.  -Follow up scheduled in our office for 2/16/17 for pacer check. Subjective:     Pleasant. No complaints. Denies pain.      Objective:      Patient Vitals for the past 8 hrs:   Temp Pulse Resp BP SpO2   01/17/17 0804 97.3 °F (36.3 °C) 75 20 173/72 91 %   01/17/17 0400 97.4 °F (36.3 °C) 75 20 148/69 100 %         Patient Vitals for the past 96 hrs:   Weight   01/17/17 0216 53.4 kg (117 lb 12.8 oz)   01/16/17 0458 52.9 kg (116 lb 9.6 oz)   01/15/17 0530 52.3 kg (115 lb 4.8 oz)   01/15/17 0431 52.5 kg (115 lb 12.8 oz)         Intake/Output Summary (Last 24 hours) at 01/17/17 1017  Last data filed at 01/16/17 1821   Gross per 24 hour   Intake              630 ml   Output              200 ml   Net              430 ml       Physical Exam:  General:  Awake, alert, oriented x 3  Neck:  Supple, no jvd  Lungs: clear to auscultation bilaterally  Heart:  Reg rate and rhythm  Abdomen: soft  Extremities: atraumatic without edema    Data Review:     Labs: Results:       Chemistry Recent Labs      01/17/17   0232  01/16/17   1455  01/16/17   0411  01/15/17   0546   GLU  82   --   90  85   NA  141   --   142  142   K  4.0  4.2  3.0*  3.4*   CL  103   --   99*  101   CO2  29   --   34*  32   BUN  24*   --   29*  26*   CREA  1.44*   --   1.90*  1.53*   CA  8.6   --   8.5  8.5   MG  2.2   --   1.6*  1.8   AGAP  9   --   9  9   BUCR  17   --   15  17      CBC w/Diff Recent Labs      01/17/17   0232  01/16/17   0411  01/15/17   0546   WBC  3.8*  4.7  4.6   RBC  2.97*  3.09*  3.12*   HGB  9.2*  9.4*  9.6*   HCT  28.3*  29.0*  29.1*   PLT  142  144  134*   GRANS  54  60  65   LYMPH  25  22  21   EOS  5  2  2      Cardiac Enzymes No results found for: CPK, CKMMB, CKMB, RCK3, CKMBT, CKNDX, CKND1, RHONDA, TROPT, TROIQ, LAMBERT, TROPT, TNIPOC, BNP, BNPP   Coagulation No results for input(s): PTP, INR, APTT in the last 72 hours.     No lab exists for component: INREXT    Lipid Panel Lab Results   Component Value Date/Time    Cholesterol, total 183 12/07/2016 04:31 PM    HDL Cholesterol 67 12/07/2016 04:31 PM    LDL,Direct 66 07/29/2010 12:18 PM    LDL, calculated 106 12/07/2016 04:31 PM    VLDL, calculated 10 12/07/2016 04:31 PM    Triglyceride 50 12/07/2016 04:31 PM    CHOL/HDL Ratio 2.7 12/07/2016 04:31 PM BNP No results found for: BNP, BNPP, XBNPT   Liver Enzymes No results for input(s): TP, ALB, TBIL, AP, SGOT, GPT in the last 72 hours.     No lab exists for component: DBIL   Digoxin    Thyroid Studies Lab Results   Component Value Date/Time    TSH 2.53 01/13/2017 11:35 AM            Signed By: ANNALISA Vera     January 17, 2017

## 2017-01-17 NOTE — PROGRESS NOTES
Spoke with Verito from [facility]. Pt has been denied by [facility]. Demarco Bunch from [facility] will call back to let us know if Pt is accepted.

## 2017-01-17 NOTE — ROUTINE PROCESS
Verbal and bedside Shift changed report given to Moni Monahan RN (oncoming RN) on Pt. Condition. Report consisted of patients Situation, History, Activities, intake/output,  Background, Assessment and Recommendations(SBAR). Information from the following report(s) Kardex, order Summary, Lab results and MAR was reviewed with the receiving nurse. Opportunity for questions and clarification was provided.

## 2017-01-17 NOTE — CARDIO/PULMONARY
A brief follow up visit completed with patient. Her granddaughter was at her side. We reviewed the importance of following a low fat/low sodium diet. She stated that she will do the best she can. Explained that she needs to look at the labels of what she chooses to make sure she does not get too much sodium on board. She stated that she will, encouraged her to exercise some as well. She needs to do what she can do so that she can keep the heart muscle strong. She will be having home PT and OT to assist her in keep her strength up. Will continue to follow throughout her hospital stay.

## 2017-01-17 NOTE — PROGRESS NOTES
conducted an Initial consultation and Spiritual Assessment for Noa Kaufman, who is a 80 y.o.,female. Patients Primary Language is: [place]. According to the patients EMR Faith Affiliation is: Gnosticist. The reason the Patient came to the hospital is:   Patient Active Problem List    Diagnosis Date Noted    CHF (congestive heart failure) ([facility] 01/14/2017    Respiratory distress 01/13/2017    Palpitation 11/28/2016    Vertigo 11/20/2012    Angina of effort ([facility] 10/27/2011    Biventricular ICD (implantable cardiac defibrillator) in place,no LV lead 09/13/2011    Ischemic cardiomyopathy, CABG X1 LIMA to the LAD 2009/EF 25%     Thrombocytopenia ([facility]     Two vessel coronary artery CCROANZ[877.26]     Systolic CHF, chronic,class III-IV     Pulmonary hypertension ([facility]     Hypertension     Angioedema     Mitral regurgitation, s/p MV repair # 30 Physio Ring     Incomplete bundle branch block     Intraventricular conduction delay     Dyspnea     Leg edema         The  provided the following Interventions:  Initiated a relationship of care and support. Patient was genial and welcoming. Explored issues of laz, belief, and spirituality. Patient confirmed her Quaker affiliation. Listened empathically and affirmed patient's positive attitude. Offered [employer], prayer and assurance of continued prayers on patient's behalf. Chart reviewed. The following outcomes were achieved:  Patient shared limited information about her medical narrative and spiritual journey. She is very much aware of her condition, which has not changed her confidence in God. Patient processed feeling about current hospitalization. Patient expressed gratitude for the 's visit. Assessment:  Patient does not have any Quaker or cultural needs that will affect patients preferences in health care.   Patient did not indicate any spiritual or Quaker issues which require Spiritual Care Services interventions at this time. Plan:  Chaplains will continue to follow and will provide pastoral care as needed or requested.  recommends bedside caregivers page  on duty if patient shows signs of acute spiritual or emotional distress. The Rev.  Kiera Garcia, [address]  [facility] [phone] / [facility] [phone]

## 2017-01-17 NOTE — PROGRESS NOTES
Spoke with Abimbola Peralta from [facility]. Pt has been denied by ARU. Discussed options with Pt. Pt deferred CM to her daughter in [place] [phone]. Discussed options with Kvng Juárez (over the phone)  and Pt. Pt is agreeable to short term rehab with [facility] [facility] at d/c. Pt has been accepted to [facility] [facility] [facility]. Pt's granddaughter- Rajiv Rice will transport Pt to facility. Pt's nurse and Trish Beth from [facility] [address] aware. Pt has been provided with a list of personal care agencies, [facility] [employer] [facility], [facility] and [facility] transportation services information.

## 2017-01-17 NOTE — ROUTINE PROCESS
Bedside and Verbal shift change report given to DENAE Hercules 106 (oncoming nurse) by Katie Newman, RN (offgoing nurse). Report included the following information SBAR, Kardex, MAR and Recent Results. SITUATION:    Code Status: DNR   Reason for Admission: Respiratory distress    [facility] day: 3   Problem List:       Hospital Problems  Date Reviewed: 11/28/2016          Codes Class Noted POA    * (Principal)CHF (congestive heart failure) ([facility] ICD-10-CM: I50.9  ICD-9-CM: 428.0  1/14/2017 Unknown        Respiratory distress ICD-10-CM: R06.00  ICD-9-CM: 786.09  1/13/2017 Unknown              BACKGROUND:    Past Medical History:   Past Medical History   Diagnosis Date    Actinic keratoses     Angioedema 2003     and respiratory failure from reaction to lisinopril    Atypical chest pain     Cardiac cath 02/06/2009     mRCA 100% (overlapping 2.75 x 28 & 2.75 x 12 Vision BMS). LM patent. mLAD 80%. CX 25%. OM1 100%. LVEDP 28.  EF 45%. Inferobasal akn. MR 3+. Left RA 50%. RA 7.  RV 40/4. PA 37/13. W 18.  CO/CI 5.0/3.2 (TD); 4.0 x 2.6 (Dick).  Cardiac echocardiogram 02/09/2010     EF 15-20%. Gr 2 DFX. Severe, diffuse hykn. Mod MR. LAE. Mod PI. PASP 40. Mild TR.  Cardiac nuclear imaging test, abnormal 09/27/2010     Mod basal inferior, early mid inferior infarction w/o ischemia. Mild mid anterior artifact vs infarction w/o ischemia. Massive LVE. EF 18%. Marked septal, inferior hypk. Inferoapical, inferoseptal dysk. Anterior hypk. RVE suggested.  Cardiovascular ankle brachial index 03/28/2014     No arterial disease at rest bilaterally. R АНДРЕЙ 1.02.  L АНДРЕЙ 1.05.  R DBI 0.48. L DBI 0.64. Exercise deferred.  Cardiovascular lower extremity venous duplex 03/03/2011     No DVT bilaterally. Bilateral puslatile flow c/w increased central venous pressures. Biphasic signals in bilateral posterior tibial arteries. Bilateral lower leg edema.     Carotid duplex 03/28/2014 Mild <50% bilateral ICA plaquing.  Coronary artery disease      Most likely myocardial infarction 12-08; stenting of the RCA and LIMA to LAD with bypass surgery later that year    Dyslipidemia     GERD (gastroesophageal reflux disease)     Heart failure 6/09,2/10,11/10     readmission to hospital 6/09, 2/10, 11/10    Hypertension     Hypothyroidism     Incomplete bundle branch block      left    Intraventricular conduction delay      with QRS duration of 120 msec with incomplete left BBB    Irritable bowel syndrome     Ischemic cardiomyopathy      EF now in the 15% to 20% range, last assessed September 2010 by nuclear stress test.    Mitral regurgitation      moderately severe, s/p mitral valve repair in 05/2009    Osteoarthritis of knee     Pacemaker 10/10     dual-chamber ICD placed, with LV lead placement with current RV pacing worsening mitral regurgitation    Pulmonary hypertension (HCC)      moderate    Renal artery stenosis (HCC)     Respiratory failure ([address] 2003    Rheumatoid [id number](134.8)     Systolic CHF, chronic (HCC)      class III to IV, with multiple recurrent admissions in the past year.     Thrombocytopenia ([address]          Patient taking anticoagulants yes    ASSESSMENT:    Changes in Assessment Throughout Shift:      Patient has Central Line: no Reasons if yes:    Patient has Bella Cath: no Reasons if yes:       Last Vitals:     Vitals:    01/16/17 0725 01/16/17 1106 01/16/17 1505 01/16/17 1900   BP: 182/77 117/62 139/64 127/64   Pulse: 60 68 63 66   Resp: 20 20 20 20   Temp: 97.9 °F (36.6 °C) 96.5 °F (35.8 °C) 97.5 °F (36.4 °C) 97.1 °F (36.2 °C)   SpO2: 100% 100% 100% 100%   Weight:       Height:            IV and DRAINS (will only show if present)   Peripheral IV 01/15/17 Right Hand-Site Assessment: Clean, dry, & intact  [REMOVED] Peripheral IV 01/13/17 Right Antecubital-Site Assessment: Clean, dry, & intact     WOUND (if present)   Wound Type:  none   Dressing present Dressing Present : No   Wound Concerns/Notes:  none     PAIN    Pain Assessment    Pain Intensity 1: 0 (01/16/17 2006)    Pain Location 1: Chest         Patient Stated Pain Goal: 0  o Interventions for Pain:  none  o Intervention effective: no  o Time of last intervention:    o Reassessment Completed: yes      Last 3 Weights:  Last 3 Recorded Weights in this Encounter    01/15/17 0431 01/15/17 0530 01/16/17 0458   Weight: 52.5 kg (115 lb 12.8 oz) 52.3 kg (115 lb 4.8 oz) 52.9 kg (116 lb 9.6 oz)     Weight change: 0.363 kg (12.8 oz)     INTAKE/OUPUT    Current Shift:      Last three shifts: 01/15 0701 - 01/16 1900  In: 1675 [P.O.:1680; I.V.:650]  Out: 2200 [Urine:2200]     LAB RESULTS     Recent Labs      01/16/17   0411  01/15/17   0546  01/14/17   0345   WBC  4.7  4.6  4.9   HGB  9.4*  9.6*  10.3*   HCT  29.0*  29.1*  30.6*   PLT  144  134*  130*        Recent Labs      01/16/17   1455  01/16/17   0411  01/15/17   0546  01/14/17   0345   NA   --   142  142  142   K  4.2  3.0*  3.4*  3.7   GLU   --   90  85  143*   BUN   --   29*  26*  24*   CREA   --   1.90*  1.53*  1.52*   CA   --   8.5  8.5  9.0   MG   --   1.6*  1.8  1.8       RECOMMENDATIONS AND DISCHARGE PLANNING     1. Pending tests/procedures/ Plan of Care or Other Needs:      2. Discharge plan for patient and Needs/Barriers: home with PT/OT in a.m    3. Estimated Discharge Date: 01/17 Posted on Whiteboard in [facility]: yes      4. The patient's care plan was reviewed with the oncoming nurse. \"HEALS\" SAFETY CHECK      Fall Risk    Total Score: 1    Safety Measures: Safety Measures: Bed/Chair-Wheels locked, Bed in low position, Call light within reach    A safety check occurred in the patient's room between off going nurse and oncoming nurse listed above.     The safety check included the below items  Area Items   H  High Alert Medications - Verify all high alert medication drips (heparin, PCA, etc.)   E  Equipment - Suction is set up for ALL patients (with michela)  - Red plugs utilized for all equipment (IV pumps, etc.)  - WOWs wiped down at end of shift.  - Room stocked with oxygen, suction, and other unit-specific supplies   A  Alarms - Bed alarm is set for fall risk patients  - Ensure chair alarm is in place and activated if patient is up in a chair   L  Lines - Check IV for any infiltration  - Bella bag is empty if patient has a Bella   - Tubing and IV bags are labeled   S  Safety   - Room is clean, patient is clean, and equipment is clean. - Hallways are clear from equipment besides carts. - Fall bracelet on for fall risk patients  - Ensure room is clear and free of clutter  - Suction is set up for ALL patients (with michela)  - Hallways are clear from equipment besides carts.    - Isolation precautions followed, supplies available outside room, sign posted     Ginny Fish RN

## 2017-01-17 NOTE — PROGRESS NOTES
ARU/IPR REFERRAL CONTACT NOTE  [address] [place] [address] for Physical Rehabilitation    RE:Zoilabreonna Patner     Thank you for the opportunity to review this patient's case for admission to [address] [place] [address] for Physical Rehabilitation. Based on our pre-admission screening:     [x ] This patient does not meet criteria for admission to [facility] for Physical  Rehabilitation due to:    [ ] Documents do not reflect active medical necessity requiring close Physician involvement and Rehabilitation Nursing. [x] Too functional, per documentation, patient does not require both Physical and Occupational Therapy Services at an acute rehabilitation level of intensity. [ ] Too low level, per documentation, patient has not demonstrated tolerance for acute rehabilitation level of intensity. [ ] Other:    [x ] We recommend the following:  [ ] Re-evaluation of this patient's status when appropriate  [x ] Home with Home Care Services  [ ] Outpatient Therapy Services  [ ] Skilled Nursing Facility/Sub Acute Services with extended stay option  [ ] Assisted Living/ Adult Home    Again, Thank you for this referral. Should you have any questions please do not hesitate to call. Sincerely,  Cydney Buckley. Sirisha Lam, [address]   Sirisha Lam, RN  Admissions [facility] for Physical Rehabilitation  [phone]

## 2017-01-17 NOTE — PROGRESS NOTES
Intern Progress Note  [facility]       Patient: Ladan Brothers MRN: [id number]  CSN: [id number]    YOB: 1928  Age: 80 y.o. Sex: female    DOA: 1/13/2017 LOS:  LOS: 4 days                    Subjective:     Acute events:  No acute events overnight. Patient slept better last night. She notes overall improvement, intermittent chest discomfort has mostly resolved, still having REED but she states she is back to her baseline level of functioning. Denies headache, lightheadedness, abdominal pain, n/v. Initially Mrs. Blaise Gowers stated she wanted to go home, but after discussion with her family, pt would like ARF or SNF for rehab. Pt worked with PT yesterday, was able to walk 175 feet, has been denied for ARF. Case management will see about SNF placement. Will get RN to do ambulatory SpO2 check on pt for possible dc with O2. Objective:      Patient Vitals for the past 24 hrs:   Temp Pulse Resp BP SpO2   01/17/17 0400 97.4 °F (36.3 °C) 75 20 148/69 100 %   01/17/17 0030 97.4 °F (36.3 °C) 65 20 157/73 100 %   01/16/17 1900 97.1 °F (36.2 °C) 66 20 127/64 100 %   01/16/17 1505 97.5 °F (36.4 °C) 63 20 139/64 100 %   01/16/17 1106 96.5 °F (35.8 °C) 68 20 117/62 100 %   01/16/17 0725 97.9 °F (36.6 °C) 60 20 182/77 100 %       Physical Exam:   Gen: Thin, frail elderly lady in no distress, nontoxic. Neck: Supple. No supraclavicular or cervical LAD. No thyromegaly. Cardiac: RRR, 1/5 VENECIA best heard apex, gallop. Pulm: scattered coarse rhonchi especially in posterior bases, no wheezes, no increase in WOB at rest   Abdomen: soft, non-tender, non-distended. Ext: Radial/DP pulses intact bilat, no bilateral edema  Skin: Warm/dry. No bruising/rashes. Neuro: A&Ox3, no focal deficits, speech clear and appropriate.     Lab/Data Reviewed:  BMP:   Lab Results   Component Value Date/Time     01/17/2017 02:32 AM    K 4.0 01/17/2017 02:32 AM     01/17/2017 02:32 AM    CO2 29 01/17/2017 02:32 AM    AGAP 9 01/17/2017 02:32 AM    GLU 82 01/17/2017 02:32 AM    BUN 24 (H) 01/17/2017 02:32 AM    CREA 1.44 (H) 01/17/2017 02:32 AM    GFRAA 42 (L) 01/17/2017 02:32 AM    GFRNA 34 (L) 01/17/2017 02:32 AM     CBC:   Lab Results   Component Value Date/Time    WBC 3.8 (L) 01/17/2017 02:32 AM    HGB 9.2 (L) 01/17/2017 02:32 AM    HCT 28.3 (L) 01/17/2017 02:32 AM     01/17/2017 02:32 AM        Scheduled Medications Reviewed:  Current Facility-Administered Medications   Medication Dose Route Frequency    bumetanide (BUMEX) tablet 1 mg  1 mg Oral DAILY    famotidine (PEPCID) tablet 20 mg  20 mg Oral DAILY    hydrALAZINE (APRESOLINE) tablet 25 mg  25 mg Oral TID    therapeutic multivitamin (THERAGRAN) tablet 1 Tab  1 Tab Oral DAILY    isosorbide mononitrate ER (IMDUR) tablet 30 mg  30 mg Oral DAILY    atorvastatin (LIPITOR) tablet 20 mg  20 mg Oral QHS    aspirin chewable tablet 81 mg  81 mg Oral DAILY    levothyroxine (SYNTHROID) tablet 50 mcg  50 mcg Oral ACB    ferrous sulfate tablet 325 mg  1 Tab Oral BID WITH MEALS    Lactobacillus Acidoph & Bulgar (FLORANEX) tablet 2 Tab  2 Tab Oral BID    potassium chloride (K-DUR, KLOR-CON) SR tablet 20 mEq  20 mEq Oral DAILY    heparin (porcine) injection 5,000 Units  5,000 Units SubCUTAneous Q8H    scopolamine (TRANSDERM-SCOP) 1.5 mg  1.5 mg TransDERmal Q72H    carvedilol (COREG) tablet 25 mg  25 mg Oral BID WITH MEALS    cefTRIAXone (ROCEPHIN) 1 g in 0.9% sodium chloride (MBP/ADV) 50 mL MBP  1 g IntraVENous Q24H    doxycycline (VIBRAMYCIN) 100 mg in 0.9% sodium chloride (MBP/ADV) 100 mL IVPB  100 mg IntraVENous Q12H         Imaging, microbiology, and EKG/Telemetry:    TTE 1/15/2017   SUMMARY:  Left ventricle: The ventricle was severely dilated. Systolic function was  severely reduced. Ejection fraction was estimated to be 20 %. There was  severe diffuse hypokinesis. Right atrium: The atrium was mildly dilated. Mitral valve:  There was moderate regurgitation. Mean transmitral gradient  was 2 mmHg. COMPARISONS:  Comparison was made with the previous study of 09-Feb-2010. LV overall  function has not changed. Mitral regurgitation has not changed. US RLE Venous 1/14/2017   REASON FOR TEST  Edema     Right Leg:-  Deep venous thrombosis: No  Superficial venous thrombosis: No  Deep venous insufficiency: Not examined  Superficial venous insufficiency: Not examined      Assessment/Plan   Seble Flor is a 80 y.o. AAF w/ PMH sig for chronic combined CHF, CKD-4, HTN, hypothyroidism, anemia, arthritis who presented w/ dyspnea, and was admitted for CHF exacerbation and pneumonia.       Acute exacerbation of chronic combined CHF:  - 2010 ECHO EF 15-20% w/ G2DD, peak pressure was 40  - 2017 ECHO EF 20%, severe diffuse hypokinesis, no significant change in LV function or MR   - Volume overload suggested by exam, proBNP >175,000, CT w/ pleural effusions. - Cardiac enzymes WNL x2, EKG non-acute. - Cards following;  ICD interrogation- normal device function    - Transitioned to PO Bumex, watch renal indices and electrolytes. - Continue home Carvedilol 25 mg PO BID   - Imdur 30 mg PO daily   - ACE- allergy, intolerance to cozaar.   - Daily weights, Strict I&Os, ICS. Tele monitor. HTN- BP better controlled overnight   - Carvedilol 25 mg PO BID   - Imdur 30 mg PO daily   - Hydralazine 25 PO TID      Hx intermittent Vtach:  - As above.      Pulmonary infiltrates on CT:1/4 SIRS  - Discontinue Ceftriaxone   - Continue doxycycline 100 mg IV BID, transition to PO for discharge  - Hx of allergy to cipro, sulfa. - Blood Cx- NGTD       Nausea:  - Scopolamine patch.       LE edema:  - Right PVL negative      CKD Stage IV  - Cr 1.53>1.90>1.44 (baseline ~1.6)  - Monitor renal fx, volume status.      Prolonged QTC:  - 1/13/17   - Cautious use of antiemetics, abx.      - Acute on chronic pain:  - No warm/swollen joints on exam.  - CK WNL.   - Hx of codeine and tylenol allergy.      Hypothyroidism:  - TSH WNL, continue home levothyroxine 50 mcg PO daily       Normocytic anemia:  - Hgb 9.6>9.4>9.2   - Iron 41 (); Ferritin 529 (8-388), TIBC 276 (250-450), Iron % Sat 15   - Continue home iron supps.       Moderate protein calorie malnutrition:  - Diffuse atrophy, poor PO intake, albumin 3.7  - Renal supps. - Nutrition consult.          Global:  Diet: Cardiac, soft solids    OOB w/ assistance, fall precautions, PT/OT. SQH for DVT ppx. PPI for GI ulcer ppx. DNR.   Likely dc today   Emergency contact: Nancy Butcher (daughter-in-law) [phone] (c), [phone] (h).      DO CHANO ChaudharyAcuteCare Health System Medicine   January 17, 2017

## 2017-01-19 LAB
BACTERIA SPEC CULT: NORMAL
BACTERIA SPEC CULT: NORMAL
SERVICE CMNT-IMP: NORMAL
SERVICE CMNT-IMP: NORMAL

## 2017-01-23 ENCOUNTER — HOSPITAL ENCOUNTER (OUTPATIENT)
Dept: LAB | Age: 82
Discharge: HOME | End: 2017-01-23

## 2017-01-23 LAB
ALBUMIN SERPL BCP-MCNC: 3.4 G/DL (ref 3.4–5)
ALBUMIN/GLOB SERPL: 1.3 {RATIO} (ref 0.8–1.7)
ALP SERPL-CCNC: 19 U/L (ref 45–117)
ALT SERPL-CCNC: 27 U/L (ref 13–56)
ANION GAP BLD CALC-SCNC: 8 MMOL/L (ref 3–18)
AST SERPL W P-5'-P-CCNC: 30 U/L (ref 15–37)
BILIRUB SERPL-MCNC: 0.6 MG/DL (ref 0.2–1)
BUN SERPL-MCNC: 29 MG/DL (ref 7–18)
BUN/CREAT SERPL: 17 (ref 12–20)
CALCIUM SERPL-MCNC: 9.7 MG/DL (ref 8.5–10.1)
CHLORIDE SERPL-SCNC: 102 MMOL/L (ref 100–108)
CO2 SERPL-SCNC: 31 MMOL/L (ref 21–32)
CREAT SERPL-MCNC: 1.71 MG/DL (ref 0.6–1.3)
GLOBULIN SER CALC-MCNC: 2.6 G/DL (ref 2–4)
GLUCOSE SERPL-MCNC: 82 MG/DL (ref 74–99)
POTASSIUM SERPL-SCNC: 4.3 MMOL/L (ref 3.5–5.5)
PROT SERPL-MCNC: 6 G/DL (ref 6.4–8.2)
SODIUM SERPL-SCNC: 141 MMOL/L (ref 136–145)

## 2017-01-23 PROCEDURE — 80053 COMPREHEN METABOLIC PANEL: CPT | Performed by: FAMILY MEDICINE

## 2017-01-24 ENCOUNTER — HOME HEALTH ADMISSION (OUTPATIENT)
Dept: HOME HEALTH SERVICES | Facility: HOME HEALTH | Age: 82
End: 2017-01-24
Payer: MEDICARE

## 2017-01-27 ENCOUNTER — HOME CARE VISIT (OUTPATIENT)
Dept: SCHEDULING | Facility: HOME HEALTH | Age: 82
End: 2017-01-27
Payer: MEDICARE

## 2017-01-27 VITALS — SYSTOLIC BLOOD PRESSURE: 120 MMHG | DIASTOLIC BLOOD PRESSURE: 70 MMHG | RESPIRATION RATE: 17 BRPM | HEART RATE: 77 BPM

## 2017-01-27 PROCEDURE — 3331090001 HH PPS REVENUE CREDIT

## 2017-01-27 PROCEDURE — 3331090002 HH PPS REVENUE DEBIT

## 2017-01-27 PROCEDURE — 400013 HH SOC

## 2017-01-27 PROCEDURE — G0151 HHCP-SERV OF PT,EA 15 MIN: HCPCS

## 2017-01-28 PROCEDURE — 3331090002 HH PPS REVENUE DEBIT

## 2017-01-28 PROCEDURE — 3331090001 HH PPS REVENUE CREDIT

## 2017-01-29 PROCEDURE — 3331090002 HH PPS REVENUE DEBIT

## 2017-01-29 PROCEDURE — 3331090001 HH PPS REVENUE CREDIT

## 2017-01-30 ENCOUNTER — HOME CARE VISIT (OUTPATIENT)
Dept: SCHEDULING | Facility: HOME HEALTH | Age: 82
End: 2017-01-30
Payer: MEDICARE

## 2017-01-30 PROCEDURE — 3331090002 HH PPS REVENUE DEBIT

## 2017-01-30 PROCEDURE — G0157 HHC PT ASSISTANT EA 15: HCPCS

## 2017-01-30 PROCEDURE — 3331090001 HH PPS REVENUE CREDIT

## 2017-01-31 ENCOUNTER — HOME CARE VISIT (OUTPATIENT)
Dept: HOME HEALTH SERVICES | Facility: HOME HEALTH | Age: 82
End: 2017-01-31
Payer: MEDICARE

## 2017-01-31 VITALS — DIASTOLIC BLOOD PRESSURE: 66 MMHG | OXYGEN SATURATION: 98 % | SYSTOLIC BLOOD PRESSURE: 120 MMHG | HEART RATE: 68 BPM

## 2017-01-31 PROCEDURE — 3331090002 HH PPS REVENUE DEBIT

## 2017-01-31 PROCEDURE — 3331090001 HH PPS REVENUE CREDIT

## 2017-02-01 ENCOUNTER — HOME CARE VISIT (OUTPATIENT)
Dept: SCHEDULING | Facility: HOME HEALTH | Age: 82
End: 2017-02-01
Payer: MEDICARE

## 2017-02-01 PROCEDURE — 3331090001 HH PPS REVENUE CREDIT

## 2017-02-01 PROCEDURE — 3331090002 HH PPS REVENUE DEBIT

## 2017-02-02 ENCOUNTER — HOME CARE VISIT (OUTPATIENT)
Dept: SCHEDULING | Facility: HOME HEALTH | Age: 82
End: 2017-02-02
Payer: MEDICARE

## 2017-02-02 PROCEDURE — 3331090002 HH PPS REVENUE DEBIT

## 2017-02-02 PROCEDURE — 3331090001 HH PPS REVENUE CREDIT

## 2017-02-03 PROCEDURE — 3331090002 HH PPS REVENUE DEBIT

## 2017-02-03 PROCEDURE — 3331090001 HH PPS REVENUE CREDIT

## 2017-02-04 PROCEDURE — 3331090001 HH PPS REVENUE CREDIT

## 2017-02-04 PROCEDURE — 3331090002 HH PPS REVENUE DEBIT

## 2017-02-05 PROCEDURE — 3331090001 HH PPS REVENUE CREDIT

## 2017-02-05 PROCEDURE — 3331090002 HH PPS REVENUE DEBIT

## 2017-02-06 ENCOUNTER — HOME CARE VISIT (OUTPATIENT)
Dept: SCHEDULING | Facility: HOME HEALTH | Age: 82
End: 2017-02-06
Payer: MEDICARE

## 2017-02-06 ENCOUNTER — HOME CARE VISIT (OUTPATIENT)
Dept: HOME HEALTH SERVICES | Facility: HOME HEALTH | Age: 82
End: 2017-02-06
Payer: MEDICARE

## 2017-02-06 VITALS
SYSTOLIC BLOOD PRESSURE: 158 MMHG | OXYGEN SATURATION: 95 % | DIASTOLIC BLOOD PRESSURE: 84 MMHG | BODY MASS INDEX: 21.92 KG/M2 | HEART RATE: 76 BPM | WEIGHT: 116 LBS

## 2017-02-06 PROCEDURE — 3331090001 HH PPS REVENUE CREDIT

## 2017-02-06 PROCEDURE — 3331090002 HH PPS REVENUE DEBIT

## 2017-02-06 PROCEDURE — G0157 HHC PT ASSISTANT EA 15: HCPCS

## 2017-02-07 VITALS — HEART RATE: 76 BPM | SYSTOLIC BLOOD PRESSURE: 158 MMHG | DIASTOLIC BLOOD PRESSURE: 84 MMHG | OXYGEN SATURATION: 95 %

## 2017-02-07 PROCEDURE — 3331090002 HH PPS REVENUE DEBIT

## 2017-02-07 PROCEDURE — 3331090001 HH PPS REVENUE CREDIT

## 2017-02-08 ENCOUNTER — HOME CARE VISIT (OUTPATIENT)
Dept: SCHEDULING | Facility: HOME HEALTH | Age: 82
End: 2017-02-08
Payer: MEDICARE

## 2017-02-08 PROCEDURE — G0157 HHC PT ASSISTANT EA 15: HCPCS

## 2017-02-08 PROCEDURE — 3331090002 HH PPS REVENUE DEBIT

## 2017-02-08 PROCEDURE — 3331090001 HH PPS REVENUE CREDIT

## 2017-02-09 PROCEDURE — 3331090001 HH PPS REVENUE CREDIT

## 2017-02-09 PROCEDURE — 3331090002 HH PPS REVENUE DEBIT

## 2017-02-10 ENCOUNTER — HOME CARE VISIT (OUTPATIENT)
Dept: SCHEDULING | Facility: HOME HEALTH | Age: 82
End: 2017-02-10
Payer: MEDICARE

## 2017-02-10 PROCEDURE — G0157 HHC PT ASSISTANT EA 15: HCPCS

## 2017-02-10 PROCEDURE — 3331090002 HH PPS REVENUE DEBIT

## 2017-02-10 PROCEDURE — 3331090001 HH PPS REVENUE CREDIT

## 2017-02-11 PROCEDURE — 3331090002 HH PPS REVENUE DEBIT

## 2017-02-11 PROCEDURE — 3331090001 HH PPS REVENUE CREDIT

## 2017-02-12 PROCEDURE — 3331090001 HH PPS REVENUE CREDIT

## 2017-02-12 PROCEDURE — 3331090002 HH PPS REVENUE DEBIT

## 2017-02-13 ENCOUNTER — OFFICE VISIT (OUTPATIENT)
Dept: CARDIOLOGY CLINIC | Age: 82
End: 2017-02-13

## 2017-02-13 ENCOUNTER — CLINICAL SUPPORT (OUTPATIENT)
Dept: CARDIOLOGY CLINIC | Age: 82
End: 2017-02-13

## 2017-02-13 VITALS
BODY MASS INDEX: 22.09 KG/M2 | HEART RATE: 64 BPM | SYSTOLIC BLOOD PRESSURE: 140 MMHG | DIASTOLIC BLOOD PRESSURE: 68 MMHG | OXYGEN SATURATION: 98 % | HEIGHT: 61 IN | WEIGHT: 117 LBS

## 2017-02-13 VITALS — SYSTOLIC BLOOD PRESSURE: 148 MMHG | HEART RATE: 82 BPM | OXYGEN SATURATION: 98 % | DIASTOLIC BLOOD PRESSURE: 82 MMHG

## 2017-02-13 DIAGNOSIS — I27.20 PULMONARY HYPERTENSION (HCC): ICD-10-CM

## 2017-02-13 DIAGNOSIS — Z95.810 BIVENTRICULAR IMPLANTABLE CARDIOVERTER-DEFIBRILLATOR IN SITU: ICD-10-CM

## 2017-02-13 DIAGNOSIS — I20.8 ANGINA OF EFFORT (HCC): Primary | ICD-10-CM

## 2017-02-13 DIAGNOSIS — I50.22 SYSTOLIC CHF, CHRONIC (HCC): ICD-10-CM

## 2017-02-13 DIAGNOSIS — Z98.890 S/P MITRAL VALVE REPAIR: ICD-10-CM

## 2017-02-13 DIAGNOSIS — I50.23 SYSTOLIC CHF, ACUTE ON CHRONIC (HCC): ICD-10-CM

## 2017-02-13 DIAGNOSIS — I25.5 ISCHEMIC CARDIOMYOPATHY: Primary | ICD-10-CM

## 2017-02-13 DIAGNOSIS — I25.5 ISCHEMIC CARDIOMYOPATHY: ICD-10-CM

## 2017-02-13 PROCEDURE — 3331090002 HH PPS REVENUE DEBIT

## 2017-02-13 PROCEDURE — 3331090001 HH PPS REVENUE CREDIT

## 2017-02-13 NOTE — PROGRESS NOTES
HISTORY OF PRESENT ILLNESS  Wilfredo Ruiz is a 80 y.o. female. HPI  She presents to the office for a post hospital visit. She came with her granddaughter, who is a R.N. at [employer]. She no longer experiences shortness of breath unless she overexerts herself. However, she continues with a significant degree of exertional chest tightness and pressure sensation. She indicates that if she overexerts herself, she will have chest tightness and pressure, which also occur with stress and anxiety. However, she did not associate her chest pain with her shortness of breath at the time of hospitalization. She denies palpitations. She has had no dizziness or syncope. She has had no symptoms to indicate TIA or amaurosis fugax. She was admitted to the hospital in January of 2017 with sudden onset of shortness of breath, orthopnea and PND. She was found to be in decompensated congestive heart failure and was treated accordingly with improvement. There was minimal troponin I elevation, which was flat. She had a repeat echocardiogram during the hospitalization, which once again demonstrated severe LV dysfunction with EF in the 20% range. PA pressure was estimated at around 30 mmHg. The left atrium was severely dilated with a volume index of 53 mL/meter2. There was no significant aortic stenosis. There was moderate mitral regurgitation. There was only a trivial pericardial effusion. She was found to be anemic with H & H at 9.2 and 28.3, respectively, with the indices not indicative of iron deficiency anemia. Her BUN/creatinine were 29 and 1.71 on 01/23/2017. Her NT-proBNP level was 175,000. Around [place] time in 2008, she had crushing substernal pressure-type pain, which lasted for half an hour or so, and subsequent myocardial perfusion imaging demonstrated moderate sized scarring in the mid to basal inferior wall and diminished LV function with EF in the 42% range.  It was felt that she sustained myocardial infarction in December 2008. She subsequently underwent cardiac catheterization on February 6, 2009, which demonstrated:    1. Total occlusion of the dominant RCA in the mid segment with some collaterals to the distal vessel. 2. Patent left main trunk with ostial 30% stenosis. 3. Large wraparound LAD with 75-80% stenosis in the mid segment. 4. 20-30% stenosis of the circumflex coronary artery throughout with total occlusion of the first obtuse marginal branch at its ostium with no significant distal collaterals.    5. Inferobasal akinesis with overall EF in the 45% range and LVEDP up to 28 mmHg.    6. Moderate to severe mitral regurgitation. 7. 60% stenosis of the right renal artery. 8. Mild to moderate pulmonary hypertension with PA pressure of 37/13 mmHg. Subsequently, the totally occluded right coronary artery was stented successfully with 2.75 mm x 28 mm Vision stent overlapping with the 12 mm length Vision stent. The plan was to see if the mitral regurgitation would improve by stenting the right coronary artery. She had repeat echocardiogram thereafter on March 4, 2009, which demonstrated EF in the 40-45% range. There was moderately severe mitral regurgitation with a regurgitant volume of 48 cc and moderate pulmonary hypertension with PA pressure estimated at 48 mmHg. Left ventricular end-diastolic dimension was 6.3 cm and end-systolic dimension was 4.9 cm, and left atrial dimension was 4.3 cm. It was felt that she could be a potential candidate for coronary artery bypass grafting and mitral valve repair as a first option and stenting of the LAD, along with medical treatment for mitral regurgitation as a second option.      She subsequently underwent open-heart surgery, which consisted of:  1. Mitral valve repair with size 30 CE Physio ring.    2.  Single LIMA to the LAD.       She was readmitted to [facility] on June 26, 2009 with chest pain and heart failure at which time repeat echocardiogram demonstrated further deterioration of her LV function with EF in the 20-25% range and mild to moderate residual mitral regurgitation and PA pressure of 60 mmHg. She was diuresed and continued on Coreg with improvement. She has been readmitted to the hospital three times with recurrent congestive heart failure but one in February 2010 was following the hernia operation. We have considered biventricular ICD but she did not qualify because of the relatively narrow QRS complex at only 110 msec. She underwent stress nuclear cardiac imaging on September 27, 2010, prior to ICD implantation, which demonstrated no significant ischemia but severe LV dysfunction, with EF in the 18% range. She ultimately underwent ICD implantation on October 13, 2010, but unfortunately attempted placement of a left ventricular lead was without success because of inadequate anatomy. She was left with dual-chamber Medtronic AICD without left ventricular lead.    We considered the surgical replacement of the left ventricular lead so that she would have a biventricular ICD. However, the QRS duration is only at most 110 ms, which may put her out of the classic indication. If she has further recurrence of decompensated congestive heart failure, we would certainly reconsider placement of the left ventricular leads surgically. Review of Systems   Constitutional: Negative for malaise/fatigue and weight loss. HENT: Positive for hearing loss. Eyes: Negative for blurred vision and double vision. Respiratory: Positive for shortness of breath. Cardiovascular: Positive for chest pain and palpitations. Negative for orthopnea, claudication, leg swelling and PND. Gastrointestinal: Negative for blood in stool, heartburn and melena. Genitourinary: Negative for dysuria, frequency, hematuria and urgency. Musculoskeletal: Positive for joint pain. Negative for back pain.    Skin: Negative for itching and rash.   Neurological: Negative for dizziness, loss of consciousness, weakness and headaches. Psychiatric/Behavioral: Negative for depression and memory loss. Physical Exam   Constitutional: She is oriented to person, place, and time. She appears well-developed and well-nourished. HENT:   Head: Normocephalic and atraumatic. Eyes: Conjunctivae are normal. Pupils are equal, round, and reactive to light. Neck: Normal range of motion. Neck supple. No JVD present. Cardiovascular: Normal rate, regular rhythm, S1 normal and S2 normal.   No extrasystoles are present. PMI is not displaced. Exam reveals no gallop and no friction rub. Murmur heard. High-pitched blowing holosystolic murmur is present with a grade of 1/6  at the apex  Pulses:       Carotid pulses are 3+ on the right side, and 3+ on the left side. Pulmonary/Chest: Effort normal. She has no rales. Abdominal: Soft. There is no tenderness. Musculoskeletal: She exhibits no edema. Neurological: She is alert and oriented to person, place, and time. No cranial nerve deficit. Skin: Skin is warm and dry. Psychiatric: She has a normal mood and affect. Her behavior is normal.     Visit Vitals    /68    Pulse 64    Ht 5' 1\" (1.549 m)    Wt 53.1 kg (117 lb)    SpO2 98%    BMI 22.11 kg/m2       Past Medical History   Diagnosis Date    Actinic keratoses     Angioedema 2003     and respiratory failure from reaction to lisinopril    Atypical chest pain     Cardiac cath 02/06/2009     mRCA 100% (overlapping 2.75 x 28 & 2.75 x 12 Vision BMS). LM patent. mLAD 80%. CX 25%. OM1 100%. LVEDP 28.  EF 45%. Inferobasal akn. MR 3+. Left RA 50%. RA 7.  RV 40/4. PA 37/13. W 18.  CO/CI 5.0/3.2 (TD); 4.0 x 2.6 (Dick).  Cardiac echocardiogram 01/15/2017     Severe LVE. EF 20%. Severe diffuse hypk. Indeterminate diastolic fx. RVSP 30 mmHg. Mild DORIS. Mod MR. Mod PI.  IVCE. Left pleural effusion. Similar to study of 2/9/10.       Cardiac nuclear imaging test, abnormal 09/27/2010     Mod basal inferior, early mid inferior infarction w/o ischemia. Mild mid anterior artifact vs infarction w/o ischemia. Massive LVE. EF 18%. Marked septal, inferior hypk. Inferoapical, inferoseptal dysk. Anterior hypk. RVE suggested.  Cardiovascular ankle brachial index 03/28/2014     No arterial disease at rest bilaterally. R АНДРЕЙ 1.02.  L АНДРЕЙ 1.05.  R DBI 0.48. L DBI 0.64. Exercise deferred.  Cardiovascular RLE venous duplex 01/14/2017     Right leg:  No DVT.  Carotid duplex 03/28/2014     Mild <50% bilateral ICA plaquing.  Coronary artery disease      Most likely myocardial infarction 12-08; stenting of the RCA and LIMA to LAD with bypass surgery later that year    Dyslipidemia     GERD (gastroesophageal reflux disease)     Heart failure 6/09,2/10,11/10     readmission to hospital 6/09, 2/10, 11/10    Hypertension     Hypothyroidism     Incomplete bundle branch block      left    Intraventricular conduction delay      with QRS duration of 120 msec with incomplete left BBB    Irritable bowel syndrome     Ischemic cardiomyopathy      EF now in the 15% to 20% range, last assessed September 2010 by nuclear stress test.    Mitral regurgitation      moderately severe, s/p mitral valve repair in 05/2009    Osteoarthritis of knee     Pacemaker 10/10     dual-chamber ICD placed, with LV lead placement with current RV pacing worsening mitral regurgitation    Pulmonary hypertension (HCC)      moderate    Renal artery stenosis (HCC)     Respiratory failure ([address] 2003    Rheumatoid EHIEXOPJH(855.0)     Systolic CHF, chronic (HCC)      class III to IV, with multiple recurrent admissions in the past year.  Thrombocytopenia ([address]        Social History     Social History    Marital status:      Spouse name: N/A    Number of children: N/A    Years of education: N/A     Occupational History    Not on file.      Social History Main Topics    Smoking status: Never Smoker    Smokeless tobacco: Never Used    Alcohol use No    Drug use: No    Sexual activity: Not on file     Other Topics Concern    Not on file     Social History Narrative       Family History   Problem Relation Age of Onset    Hypertension Sister        Past Surgical History   Procedure Laterality Date    Hx hysterectomy      Hx bladder repair      Hx hemorrhoidectomy      Hx heart catheterization  02/09     with stenting of the total occluded right coronary artery    Hx coronary stent placement  2/6/09     totally occluded right coronary artery was stented successfully with 2.75 mm x 28 mm Vision stent overlapping with the 12 mm length Vision stent    Hx pacemaker  10/13/10     dual-chamber Medtronic AICD without left ventricular lead    Hx heart valve surgery       1. Mitral valve repair with size 30 CE Physio ring. 2. Single LIMA to the LAD       Current Outpatient Prescriptions   Medication Sig Dispense Refill    Ca-D3-mag ox-zinc--romina-bor (CALCIUM 600-D3 PLUS) 600 mg calcium- 800 unit-50 mg tab Take 1 Each by mouth daily.  b complex vitamins (B COMPLEX 1) tablet Take 1 Tab by mouth daily.  atorvastatin (LIPITOR) 20 mg tablet Take 1 Tab by mouth nightly. 30 Tab 0    hydrALAZINE (APRESOLINE) 25 mg tablet Take 1 Tab by mouth three (3) times daily. 90 Tab 0    isosorbide mononitrate ER (IMDUR) 30 mg tablet Take 1 Tab by mouth daily. 30 Tab 0    carvedilol (COREG) 25 mg tablet Take 1 Tab by mouth two (2) times daily (with meals). 60 Tab 6    fluticasone (FLONASE) 50 mcg/actuation nasal spray 2 Sprays by Both Nostrils route nightly.  omeprazole (PRILOSEC) 20 mg capsule Take 20 mg by mouth daily.  ferrous sulfate (IRON) 325 mg (65 mg iron) tablet Take 65 mg by mouth two (2) times a day.  flaxseed oil 1,000 mg cap Take  by mouth two (2) times a day.  LACTOBACILLUS ACIDOPHILUS (PROBIOTIC PO) Take  by mouth two (2) times a day. 2 tabs bid      BUMETANIDE (BUMEX PO) Take 1 mg by mouth as directed. Take one tablet daily and as directed if weight up 2+ pounds       potassium chloride (K-DUR) 20 mEq tablet Take 20 mEq by mouth. Take 1 tablet in the morning and 0.5 tablet in the evening      CRANBERRY EXT/C/L. SPOROGENES (CRANBERRY-PROBIOTICS-VITAMIN C PO) Take 1,500 mg by mouth two (2) times a day. 3 tablets      aspirin 81 mg tablet Take 81 mg by mouth daily.  levothyroxine (LEVOXYL) 50 mcg tablet Take 50 mcg by mouth daily (before breakfast).  nitroglycerin (NITROSTAT) 0.4 mg SL tablet 0.4 mg by SubLINGual route every five (5) minutes as needed. for chest pain         EKG: unchanged from previous tracings, nonspecific ST and T waves changes, atrially paced rhythm,LVH/Strain  . ASSESSMENT and PLAN  Encounter Diagnoses   Name Primary?  Angina of effort ([facility] Yes    Ischemic cardiomyopathy, CABG X1 LIMA to the LAD 6337/ELVIS 36%     Systolic CHF, chronic,class III-IV     Mitral regurgitation, s/p MV repair # 30 Physio Ring     Pulmonary hypertension ([facility]     Biventricular ICD (implantable cardiac defibrillator) in place,no LV lead     Systolic CHF, acute on chronic [facility])    She had an acute exacerbation of chronic congestive heart failure in January of 2017. The etiology of the acute decompensation is not quite clear; however, the possibility of ischemia-induced acute heart failure cannot be excluded particularly in light of the fact that she has had frequent exertional anginal chest pain. She does have a good setup for recurrent congestive heart failure because of the severe LV dysfunction and mitral regurgitation. We discussed cardiac catheterization, but there are a great deal of concerns regarding cardiac catheterization with issues with renal dysfunction, moderately severe anemia and her advanced age.   We also discussed the continued medical treatment with the addition of Ranexa and/or Entresto and the granddaughter would like to have a family discussion before making a final decision. Her ICD demonstrated elevated OptiVol during the month of January when she was admitted to the hospital with heart failure, but now, the OptiVol is normal  indicating that she is well compensated at this time. I would also consider implanting a left ventricular lead for biventricular ICD, but this will require surgery with general anesthesia, which itself carries some surgical risk on this patient that was recently admitted with congestive heart failure. Her granddaughter seems to be leaning toward continued medical treatment particularly because of the risk of renal failure from the dye load. She was once on Aldactone many years ago, which had to be discontinued for unknown side effects. I spent over forty minutes with the patient and her granddaughter in face-to-face consultation of which greater than fifty percent was spent in counseling and discussion.

## 2017-02-13 NOTE — MR AVS SNAPSHOT
Visit Information Date & Time Provider Department Dept. Phone Encounter #  
 2/13/2017 11:20 AM Gina Montes MD [facility] [address] [id number] Your Appointments 6/5/2017  1:40 PM  
Follow Up with Gina Montes MD  
[facility] ([facility]) Appt Note: 6 mth f/up [place] [id number] [address] [address] [phone] [address] [place] [address] [address] Upcoming Health Maintenance Date Due DTaP/Tdap/Td series (1 - Tdap) 6/21/1949 ZOSTER VACCINE AGE 60> 6/21/1988 GLAUCOMA SCREENING Q2Y 6/21/1993 OSTEOPOROSIS SCREENING (DEXA) 6/21/1993 Pneumococcal 65+ High/Highest Risk (1 of 2 - PCV13) 6/21/1993 MEDICARE YEARLY EXAM 6/21/1993 INFLUENZA AGE 9 TO ADULT 8/1/2016 Allergies as of 2/13/2017  Review Complete On: 2/13/2017 By: Gina Montes MD  
  
 Severity Noted Reaction Type Reactions Ace Inhibitors    Swelling  
 wheezing Ciprofloxacin    Nausea Only Dizziness, nausea Codeine    Nausea Only Cozaar [Losartan]    Swelling Tongue edema and generalized swelling Darvocet A500 [Propoxyphene N-acetaminophen]    Other (comments) Dizziness Gabapentin    Swelling (Neurontin) Lisinopril    Swelling Throat swells Magox [Magnesium Oxide]  03/22/2011    Diarrhea Norvasc [Amlodipine]    Swelling Tongue edema and generalized swelling Smz-tmp Ds [Sulfamethoxazole-trimethoprim]    Swelling Throat swells Current Immunizations  Never Reviewed No immunizations on file. Not reviewed this visit You Were Diagnosed With   
  
 Codes Comments Ischemic cardiomyopathy    -  Primary ICD-10-CM: I25.5 ICD-9-CM: 414.8 Angina of effort [facility])     ICD-10-CM: I20.8 ICD-9-CM: 413.9 Systolic CHF, chronic (HCC)     ICD-10-CM: I50.22 ICD-9-CM: 428.22, 428.0 S/P mitral valve repair     ICD-10-CM: J03.366 ICD-9-CM: V45.89 Pulmonary hypertension ([facility]     ICD-10-CM: I27.2 ICD-9-CM: 416.8 Biventricular implantable cardioverter-defibrillator in situ     ICD-10-CM: Z95.810 ICD-9-CM: V45.02 Vitals BP Pulse Height(growth percentile) Weight(growth percentile) SpO2 BMI  
 140/68 64 5' 1\" (1.549 m) 117 lb (53.1 kg) 98% 22.11 kg/m2 OB Status Smoking Status Hysterectomy Never Smoker Vitals History BMI and BSA Data Body Mass Index Body Surface Area  
 22.11 kg/m 2 1.51 m 2 Preferred Pharmacy Pharmacy Name [facility] [address] [place] [address] [phone] Your Updated Medication List  
  
   
This list is accurate as of: 2/13/17 12:41 PM.  Always use your most recent med list.  
  
  
  
  
 aspirin 81 mg tablet Take 81 mg by mouth daily. atorvastatin 20 mg tablet Commonly known as:  LIPITOR Take 1 Tab by mouth nightly. B COMPLEX 1 tablet Generic drug:  b complex vitamins Take 1 Tab by mouth daily. BUMEX PO Take 1 mg by mouth as directed. Take one tablet daily and as directed if weight up 2+ pounds CALCIUM 600-D3 PLUS 600 mg calcium- 800 unit-50 mg Tab Generic drug:  Ca-D3-mag ox-zinc--romina-bor Take 1 Each by mouth daily. carvedilol 25 mg tablet Commonly known as:  Macel Preston Take 1 Tab by mouth two (2) times daily (with meals). CRANBERRY-PROBIOTICS-VITAMIN C PO Take 1,500 mg by mouth two (2) times a day. 3 tablets  
  
 flaxseed oil 1,000 mg Cap Take  by mouth two (2) times a day. FLONASE 50 mcg/actuation nasal spray Generic drug:  fluticasone 2 Sprays by Both Nostrils route nightly. hydrALAZINE 25 mg tablet Commonly known as:  APRESOLINE Take 1 Tab by mouth three (3) times daily. Iron 325 mg (65 mg iron) tablet Generic drug:  ferrous sulfate Take 65 mg by mouth two (2) times a day. isosorbide mononitrate ER 30 mg tablet Commonly known as:  IMDUR Take 1 Tab by mouth daily. K-DUR 20 mEq tablet Generic drug:  potassium chloride Take 20 mEq by mouth. Take 1 tablet in the morning and 0.5 tablet in the evening LEVOXYL 50 mcg tablet Generic drug:  levothyroxine Take 50 mcg by mouth daily (before breakfast). nitroglycerin 0.4 mg SL tablet Commonly known as:  NITROSTAT  
0.4 mg by SubLINGual route every five (5) minutes as needed. for chest pain PriLOSEC 20 mg capsule Generic drug:  omeprazole Take 20 mg by mouth daily. PROBIOTIC PO Take  by mouth two (2) times a day. 2 tabs bid We Performed the Following AMB POC EKG ROUTINE W/ 12 LEADS, INTER & REP [11451 CPT(R)] To-Do List   
 02/16/2017 To Be Determined Appointment with Tarun Post at [address] [place] [address] Please provide this summary of care documentation to your next provider. Your primary care clinician is listed as Yue Trevino. If you have any questions after today's visit, please call [phone].

## 2017-02-13 NOTE — PROGRESS NOTES
1. Have you been to the ER, urgent care clinic since your last visit? Hospitalized since your last visit? 1/13/17 - 1/17/17 CHF/ pneumonia   2. Have you seen or consulted any other health care providers outside of the [address] [place] [address] since your last visit? Include any pap smears or colon screening.   no

## 2017-02-14 ENCOUNTER — HOME CARE VISIT (OUTPATIENT)
Dept: SCHEDULING | Facility: HOME HEALTH | Age: 82
End: 2017-02-14
Payer: MEDICARE

## 2017-02-14 VITALS — OXYGEN SATURATION: 98 % | DIASTOLIC BLOOD PRESSURE: 80 MMHG | HEART RATE: 84 BPM | SYSTOLIC BLOOD PRESSURE: 142 MMHG

## 2017-02-14 VITALS — OXYGEN SATURATION: 98 % | SYSTOLIC BLOOD PRESSURE: 140 MMHG | DIASTOLIC BLOOD PRESSURE: 70 MMHG | HEART RATE: 70 BPM

## 2017-02-14 PROCEDURE — G0157 HHC PT ASSISTANT EA 15: HCPCS

## 2017-02-14 PROCEDURE — 3331090002 HH PPS REVENUE DEBIT

## 2017-02-14 PROCEDURE — 3331090001 HH PPS REVENUE CREDIT

## 2017-02-14 PROCEDURE — G0152 HHCP-SERV OF OT,EA 15 MIN: HCPCS

## 2017-02-15 VITALS — SYSTOLIC BLOOD PRESSURE: 146 MMHG | DIASTOLIC BLOOD PRESSURE: 69 MMHG | OXYGEN SATURATION: 97 % | HEART RATE: 68 BPM

## 2017-02-15 PROCEDURE — 3331090001 HH PPS REVENUE CREDIT

## 2017-02-15 PROCEDURE — 3331090002 HH PPS REVENUE DEBIT

## 2017-02-16 ENCOUNTER — HOME CARE VISIT (OUTPATIENT)
Dept: SCHEDULING | Facility: HOME HEALTH | Age: 82
End: 2017-02-16
Payer: MEDICARE

## 2017-02-16 VITALS
TEMPERATURE: 99.3 F | OXYGEN SATURATION: 98 % | HEART RATE: 65 BPM | DIASTOLIC BLOOD PRESSURE: 60 MMHG | SYSTOLIC BLOOD PRESSURE: 140 MMHG

## 2017-02-16 PROCEDURE — 3331090002 HH PPS REVENUE DEBIT

## 2017-02-16 PROCEDURE — 3331090001 HH PPS REVENUE CREDIT

## 2017-02-16 PROCEDURE — 3331090003 HH PPS REVENUE ADJ

## 2017-02-16 PROCEDURE — G0151 HHCP-SERV OF PT,EA 15 MIN: HCPCS

## 2017-02-17 PROCEDURE — 3331090001 HH PPS REVENUE CREDIT

## 2017-02-17 PROCEDURE — 3331090002 HH PPS REVENUE DEBIT

## 2017-02-18 PROCEDURE — 3331090001 HH PPS REVENUE CREDIT

## 2017-02-18 PROCEDURE — 3331090002 HH PPS REVENUE DEBIT

## 2017-02-19 PROCEDURE — 3331090001 HH PPS REVENUE CREDIT

## 2017-02-19 PROCEDURE — 3331090002 HH PPS REVENUE DEBIT

## 2017-03-07 NOTE — PROGRESS NOTES
I have personally seen and evaluated the device findings. Interrogation reviewed and I agree with assessment.     Jordan Rodriguez

## 2017-03-28 ENCOUNTER — OFFICE VISIT (OUTPATIENT)
Dept: CARDIOLOGY CLINIC | Age: 82
End: 2017-03-28

## 2017-03-28 VITALS
OXYGEN SATURATION: 98 % | HEIGHT: 61 IN | SYSTOLIC BLOOD PRESSURE: 160 MMHG | DIASTOLIC BLOOD PRESSURE: 72 MMHG | BODY MASS INDEX: 22.28 KG/M2 | WEIGHT: 118 LBS | HEART RATE: 68 BPM

## 2017-03-28 DIAGNOSIS — I25.5 ISCHEMIC CARDIOMYOPATHY: ICD-10-CM

## 2017-03-28 DIAGNOSIS — Z95.810 BIVENTRICULAR IMPLANTABLE CARDIOVERTER-DEFIBRILLATOR IN SITU: ICD-10-CM

## 2017-03-28 DIAGNOSIS — I20.8 ANGINA OF EFFORT (HCC): Primary | ICD-10-CM

## 2017-03-28 DIAGNOSIS — I27.20 PULMONARY HYPERTENSION (HCC): ICD-10-CM

## 2017-03-28 DIAGNOSIS — Z98.890 HISTORY OF MITRAL VALVE REPAIR: ICD-10-CM

## 2017-03-28 DIAGNOSIS — I50.23 SYSTOLIC CHF, ACUTE ON CHRONIC (HCC): ICD-10-CM

## 2017-03-28 RX ORDER — ISOSORBIDE MONONITRATE 120 MG/1
120 TABLET, EXTENDED RELEASE ORAL DAILY
Qty: 30 TAB | Refills: 6 | Status: SHIPPED | OUTPATIENT
Start: 2017-03-28 | End: 2017-01-01 | Stop reason: SDUPTHER

## 2017-03-28 NOTE — PROGRESS NOTES
1. Have you been to the ER, urgent care clinic since your last visit? Hospitalized since your last visit? No  2. Have you seen or consulted any other health care providers outside of the [address] [place] [address] since your last visit? Include any pap smears or colon screening.  no

## 2017-03-28 NOTE — MR AVS SNAPSHOT
Visit Information Date & Time Provider Department Dept. Phone Encounter #  
 3/28/2017  1:00 PM Sebastian Soriano MD [facility] [phone] [id number] Your Appointments 6/5/2017  1:40 PM  
Follow Up with Sebastian Soriano MD  
[facility] ([facility]) Appt Note: 6 mth f/up [place] [id number] [phone] [address] [place] [address] [address] Upcoming Health Maintenance Date Due DTaP/Tdap/Td series (1 - Tdap) 6/21/1949 ZOSTER VACCINE AGE 60> 6/21/1988 GLAUCOMA SCREENING Q2Y 6/21/1993 OSTEOPOROSIS SCREENING (DEXA) 6/21/1993 Pneumococcal 65+ High/Highest Risk (1 of 2 - PCV13) 6/21/1993 MEDICARE YEARLY EXAM 6/21/1993 INFLUENZA AGE 9 TO ADULT 8/1/2016 Allergies as of 3/28/2017  Review Complete On: 3/28/2017 By: Sebastian Soriano MD  
  
 Severity Noted Reaction Type Reactions Ace Inhibitors    Swelling  
 wheezing Ciprofloxacin    Nausea Only Dizziness, nausea Codeine    Nausea Only Cozaar [Losartan]    Swelling Tongue edema and generalized swelling Darvocet A500 [Propoxyphene N-acetaminophen]    Other (comments) Dizziness Gabapentin    Swelling (Neurontin) Lisinopril    Swelling Throat swells Magox [Magnesium Oxide]  03/22/2011    Diarrhea Norvasc [Amlodipine]    Swelling Tongue edema and generalized swelling Smz-tmp Ds [Sulfamethoxazole-trimethoprim]    Swelling Throat swells Current Immunizations  Never Reviewed No immunizations on file. Not reviewed this visit You Were Diagnosed With   
  
 Codes Comments Ischemic cardiomyopathy    -  Primary ICD-10-CM: I25.5 ICD-9-CM: 414.8 Angina of effort [facility])     ICD-10-CM: I20.8 ICD-9-CM: 413.9 Systolic CHF, acute on chronic (HCC)     ICD-10-CM: L58.79 ICD-9-CM: 428.23, 428.0 History of mitral valve repair     ICD-10-CM: S6720576 ICD-9-CM: V15.1 Pulmonary hypertension ([facility]     ICD-10-CM: I27.2 ICD-9-CM: 416.8 Biventricular implantable cardioverter-defibrillator in situ     ICD-10-CM: Z95.810 ICD-9-CM: V45.02 Vitals BP Pulse Height(growth percentile) Weight(growth percentile) SpO2 BMI  
 160/72 68 5' 1\" (1.549 m) 118 lb (53.5 kg) 98% 22.3 kg/m2 OB Status Smoking Status Hysterectomy Never Smoker Vitals History BMI and BSA Data Body Mass Index Body Surface Area  
 22.3 kg/m 2 1.52 m 2 Preferred Pharmacy Pharmacy Name [facility], [address] [place] [address],[address] [place] [address] [phone] Your Updated Medication List  
  
   
This list is accurate as of: 3/28/17  1:54 PM.  Always use your most recent med list.  
  
  
  
  
 aspirin 81 mg tablet Take 81 mg by mouth daily. atorvastatin 20 mg tablet Commonly known as:  LIPITOR Take 1 Tab by mouth nightly. B COMPLEX 1 tablet Generic drug:  b complex vitamins Take 1 Tab by mouth daily. BUMEX PO Take 1 mg by mouth as directed. Take one tablet daily and as directed if weight up 2+ pounds CALCIUM 600-D3 PLUS 600 mg calcium- 800 unit-50 mg Tab Generic drug:  Ca-D3-mag ox-zinc--romina-bor Take 1 Each by mouth daily. carvedilol 25 mg tablet Commonly known as:  Juline Balzarine Take 1 Tab by mouth two (2) times daily (with meals). CRANBERRY-PROBIOTICS-VITAMIN C PO Take 1,500 mg by mouth two (2) times a day. 3 tablets  
  
 flaxseed oil 1,000 mg Cap Take  by mouth two (2) times a day. FLONASE 50 mcg/actuation nasal spray Generic drug:  fluticasone 2 Sprays by Both Nostrils route nightly. hydrALAZINE 25 mg tablet Commonly known as:  APRESOLINE Take 1 Tab by mouth three (3) times daily. Iron 325 mg (65 mg iron) tablet Generic drug:  ferrous sulfate Take 65 mg by mouth two (2) times a day. isosorbide mononitrate ER 30 mg tablet Commonly known as:  IMDUR Take 1 Tab by mouth daily. K-DUR 20 mEq tablet Generic drug:  potassium chloride Take 20 mEq by mouth two (2) times a day. LEVOXYL 50 mcg tablet Generic drug:  levothyroxine Take 50 mcg by mouth daily (before breakfast). nitroglycerin 0.4 mg SL tablet Commonly known as:  NITROSTAT  
0.4 mg by SubLINGual route every five (5) minutes as needed. for chest pain PriLOSEC 20 mg capsule Generic drug:  omeprazole Take 20 mg by mouth daily. PROBIOTIC PO Take  by mouth two (2) times a day. 2 tabs bid We Performed the Following AMB POC EKG ROUTINE W/ 12 LEADS, INTER & REP [50784 CPT(R)] Please provide this summary of care documentation to your next provider. Your primary care clinician is listed as An Wilson. If you have any questions after today's visit, please call [phone].

## 2017-03-29 ENCOUNTER — TELEPHONE (OUTPATIENT)
Dept: CARDIAC REHAB | Age: 82
End: 2017-03-29

## 2017-03-29 NOTE — TELEPHONE ENCOUNTER
[facility] called patient and left a message. Will follow up.     Thank you,  Victoriano Jeronimo

## 2017-06-05 ENCOUNTER — OFFICE VISIT (OUTPATIENT)
Dept: CARDIOLOGY CLINIC | Age: 82
End: 2017-06-05

## 2017-06-05 ENCOUNTER — CLINICAL SUPPORT (OUTPATIENT)
Dept: CARDIOLOGY CLINIC | Age: 82
End: 2017-06-05

## 2017-06-05 VITALS
SYSTOLIC BLOOD PRESSURE: 142 MMHG | WEIGHT: 115 LBS | HEART RATE: 62 BPM | OXYGEN SATURATION: 97 % | HEIGHT: 61 IN | DIASTOLIC BLOOD PRESSURE: 60 MMHG | BODY MASS INDEX: 21.71 KG/M2

## 2017-06-05 DIAGNOSIS — Z98.890 S/P MITRAL VALVE REPAIR: ICD-10-CM

## 2017-06-05 DIAGNOSIS — I20.8 ANGINA OF EFFORT (HCC): Primary | ICD-10-CM

## 2017-06-05 DIAGNOSIS — I27.20 PULMONARY HYPERTENSION (HCC): ICD-10-CM

## 2017-06-05 DIAGNOSIS — I25.5 ISCHEMIC CARDIOMYOPATHY: ICD-10-CM

## 2017-06-05 DIAGNOSIS — I50.23 SYSTOLIC CHF, ACUTE ON CHRONIC (HCC): ICD-10-CM

## 2017-06-05 DIAGNOSIS — I25.5 ISCHEMIC CARDIOMYOPATHY: Primary | ICD-10-CM

## 2017-06-05 DIAGNOSIS — Z95.810 BIVENTRICULAR IMPLANTABLE CARDIOVERTER-DEFIBRILLATOR IN SITU: ICD-10-CM

## 2017-06-05 NOTE — PROGRESS NOTES
1. Have you been to the ER, urgent care clinic since your last visit? Hospitalized since your last visit? No   2. Have you seen or consulted any other health care providers outside of the [address] [place] [address] since your last visit?   Include any pap smears or colon screening no

## 2017-06-05 NOTE — PROGRESS NOTES
HISTORY OF PRESENT ILLNESS  Mk Andino is a 80 y.o. female. HPI   She continues with recurrent episodes of exertional chest pain. She indicates that there has been no difference in terms of the frequency of her exertional chest pain since Imdur was increased to 120 mg daily. She remains to be reasonably active for her age of [de-identified] going on [de-identified]. She has had no resting chest pain. She does complain of dyspnea on exertion, but no resting dyspnea, orthopnea or PND. She has had occasional fluttering in her chest, but no prolonged palpitations. She has had no dizziness or syncope. She has had no symptoms to indicate TIA or amaurosis fugax. Her ICD interrogation demonstrated normal OptiVol. She has had no recurrence of atrial fibrillation in the past two to three months. When she was seen in my office on 3/28/17 my recommendation was as follows: She continues with frequent exertional chest pain daily. Currently, she shows no physical findings of decompensated congestive heart failure. We discussed the aggressive treatment with invasive diagnostic workup, such as cardiac catheterization, and continued medical treatment. Three members of her family asked multiple questions and we discussed the concerns about her anemia, renal dysfunction and advanced age. They were advised that she needs to be evaluated and treated for anemia first before proceeding with the invasive workup and treatment. They were also advised that there is a great deal of risk of renal failure with the dye load particularly with her coexisting renal dysfunction and advanced age. I recommended a trial of Ranexa and there is a great deal of concern over the high cost of the medication. I recommended that we try her on the Ranexa samples first before making a final decision on continued treatment. We will apply for a Ranexa sample.  For the time being, I would try a higher dose of Imdur at 120 mg per day from 30 mg. After a long discussion, the patient and her family members agreed to go on with continued medical treatment first before any consideration of invasive workup and treatment.     Around [place] time in 2008, she had crushing substernal pressure-type pain, which lasted for half an hour or so, and subsequent myocardial perfusion imaging demonstrated moderate sized scarring in the mid to basal inferior wall and diminished LV function with EF in the 42% range. It was felt that she sustained myocardial infarction in December 2008. She subsequently underwent cardiac catheterization on February 6, 2009, which demonstrated:    1. Total occlusion of the dominant RCA in the mid segment with some collaterals to the distal vessel. 2. Patent left main trunk with ostial 30% stenosis. 3. Large wraparound LAD with 75-80% stenosis in the mid segment. 4. 20-30% stenosis of the circumflex coronary artery throughout with total occlusion of the first obtuse marginal branch at its ostium with no significant distal collaterals.    5. Inferobasal akinesis with overall EF in the 45% range and LVEDP up to 28 mmHg.    6. Moderate to severe mitral regurgitation. 7. 60% stenosis of the right renal artery. 8. Mild to moderate pulmonary hypertension with PA pressure of 37/13 mmHg. Subsequently, the totally occluded right coronary artery was stented successfully with 2.75 mm x 28 mm Vision stent overlapping with the 12 mm length Vision stent. The plan was to see if the mitral regurgitation would improve by stenting the right coronary artery. She had repeat echocardiogram thereafter on March 4, 2009, which demonstrated EF in the 40-45% range. There was moderately severe mitral regurgitation with a regurgitant volume of 48 cc and moderate pulmonary hypertension with PA pressure estimated at 48 mmHg. Left ventricular end-diastolic dimension was 6.3 cm and end-systolic dimension was 4.9 cm, and left atrial dimension was 4.3 cm.  It was felt that she could be a potential candidate for coronary artery bypass grafting and mitral valve repair as a first option and stenting of the LAD, along with medical treatment for mitral regurgitation as a second option.      She subsequently underwent open-heart surgery, which consisted of:  1. Mitral valve repair with size 30 CE Physio ring.    2. Single LIMA to the LAD.       She was readmitted to [facility] on June 26, 2009 with chest pain and heart failure at which time repeat echocardiogram demonstrated further deterioration of her LV function with EF in the 20-25% range and mild to moderate residual mitral regurgitation and PA pressure of 60 mmHg. She was diuresed and continued on Coreg with improvement. She has been readmitted to the hospital three times with recurrent congestive heart failure but one in February 2010 was following the hernia operation. We have considered biventricular ICD but she did not qualify because of the relatively narrow QRS complex at only 110 msec. She underwent stress nuclear cardiac imaging on September 27, 2010, prior to ICD implantation, which demonstrated no significant ischemia but severe LV dysfunction, with EF in the 18% range. She ultimately underwent ICD implantation on October 13, 2010, but unfortunately attempted placement of a left ventricular lead was without success because of inadequate anatomy. She was left with dual-chamber Medtronic AICD without left ventricular lead.    We considered the surgical replacement of the left ventricular lead so that she would have a biventricular ICD. However, the QRS duration is only at most 110 ms, which may put her out of the classic indication. If she has further recurrence of decompensated congestive heart failure, we would certainly reconsider placement of the left ventricular leads surgically. She was admitted to the hospital in January of 2017 with sudden onset of shortness of breath, orthopnea and PND.  She was found to be in decompensated congestive heart failure and was treated accordingly with improvement. There was minimal troponin I elevation, which was flat. She had a repeat echocardiogram during the hospitalization, which once again demonstrated severe LV dysfunction with EF in the 20% range. PA pressure was estimated at around 30 mmHg. The left atrium was severely dilated with a volume index of 53 mL/meter2. There was no significant aortic stenosis. There was moderate mitral regurgitation. There was only a trivial pericardial effusion. She was found to be anemic with H & H at 9.2 and 28.3, respectively, with the indices not indicative of iron deficiency anemia. Her BUN/creatinine were 29 and 1.71 on 01/23/2017. Her NT-proBNP level was 175,000. Review of Systems   Constitutional: Positive for malaise/fatigue. Negative for weight loss. HENT: Positive for hearing loss. Eyes: Negative for blurred vision and double vision. Respiratory: Positive for shortness of breath. Cardiovascular: Positive for chest pain and palpitations. Negative for orthopnea, claudication, leg swelling and PND. Gastrointestinal: Negative for blood in stool, heartburn and melena. Genitourinary: Negative for dysuria, frequency, hematuria and urgency. Musculoskeletal: Positive for joint pain. Negative for back pain. Skin: Negative for itching and rash. Neurological: Negative for dizziness, loss of consciousness, weakness and headaches. Psychiatric/Behavioral: Negative for depression and memory loss. Physical Exam   Constitutional: She is oriented to person, place, and time. She appears well-developed and well-nourished. HENT:   Head: Normocephalic and atraumatic. Eyes: Conjunctivae are normal. Pupils are equal, round, and reactive to light. Neck: Normal range of motion. Neck supple. No JVD present. Cardiovascular: Normal rate, regular rhythm, S1 normal and S2 normal.   No extrasystoles are present.  PMI is not displaced. Exam reveals no gallop and no friction rub. Murmur heard. High-pitched blowing early systolic murmur is present with a grade of 1/6  at the apex  Pulses:       Carotid pulses are 3+ on the right side, and 3+ on the left side. Pulmonary/Chest: Effort normal. She has no rales. Abdominal: Soft. There is no tenderness. Musculoskeletal: She exhibits no edema. Neurological: She is alert and oriented to person, place, and time. No cranial nerve deficit. Skin: Skin is warm and dry. Psychiatric: She has a normal mood and affect. Her behavior is normal.     Visit Vitals    /60    Pulse 62    Ht 5' 1\" (1.549 m)    Wt 52.2 kg (115 lb)    SpO2 97%    BMI 21.73 kg/m2       Past Medical History:   Diagnosis Date    Actinic keratoses     Angioedema 2003    and respiratory failure from reaction to lisinopril    Atypical chest pain     Cardiac cath 02/06/2009    mRCA 100% (overlapping 2.75 x 28 & 2.75 x 12 Vision BMS). LM patent. mLAD 80%. CX 25%. OM1 100%. LVEDP 28.  EF 45%. Inferobasal akn. MR 3+. Left RA 50%. RA 7.  RV 40/4. PA 37/13. W 18.  CO/CI 5.0/3.2 (TD); 4.0 x 2.6 (Dick).  Cardiac echocardiogram 01/15/2017    Severe LVE. EF 20%. Severe diffuse hypk. Indeterminate diastolic fx. RVSP 30 mmHg. Mild DORIS. Mod MR. Mod PI.  IVCE. Left pleural effusion. Similar to study of 2/9/10.  Cardiac nuclear imaging test, abnormal 09/27/2010    Mod basal inferior, early mid inferior infarction w/o ischemia. Mild mid anterior artifact vs infarction w/o ischemia. Massive LVE. EF 18%. Marked septal, inferior hypk. Inferoapical, inferoseptal dysk. Anterior hypk. RVE suggested.  Cardiovascular ankle brachial index 03/28/2014    No arterial disease at rest bilaterally. R АНДРЕЙ 1.02.  L АНДРЕЙ 1.05.  R DBI 0.48. L DBI 0.64. Exercise deferred.  Cardiovascular RLE venous duplex 01/14/2017    Right leg:  No DVT.       Carotid duplex 03/28/2014    Mild <50% bilateral [facility].  Coronary artery disease     Most likely myocardial infarction 12-08; stenting of the RCA and LIMA to LAD with bypass surgery later that year    Dyslipidemia     GERD (gastroesophageal reflux disease)     Heart failure 6/09,2/10,11/10    readmission to hospital 6/09, 2/10, 11/10    Hypertension     Hypothyroidism     Incomplete bundle branch block     left    Intraventricular conduction delay     with QRS duration of 120 msec with incomplete left BBB    Irritable bowel syndrome     Ischemic cardiomyopathy     EF now in the 15% to 20% range, last assessed September 2010 by nuclear stress test.    Mitral regurgitation     moderately severe, s/p mitral valve repair in 05/2009    Osteoarthritis of knee     Pacemaker 10/10    dual-chamber ICD placed, with LV lead placement with current RV pacing worsening mitral regurgitation    Pulmonary hypertension (HCC)     moderate    Renal artery stenosis (HCC)     Respiratory failure ([address] 2003    Rheumatoid arthritis ([address]     Systolic CHF, chronic ([address]     class III to IV, with multiple recurrent admissions in the past year.  Thrombocytopenia ([address]        Social History     Social History    Marital status:      Spouse name: N/A    Number of children: N/A    Years of education: N/A     Occupational History    Not on file.      Social History Main Topics    Smoking status: Never Smoker    Smokeless tobacco: Never Used    Alcohol use No    Drug use: No    Sexual activity: Not on file     Other Topics Concern    Not on file     Social History Narrative       Family History   Problem Relation Age of Onset    Hypertension Sister        Past Surgical History:   Procedure Laterality Date    HX BLADDER REPAIR      HX CORONARY STENT PLACEMENT  2/6/09    totally occluded right coronary artery was stented successfully with 2.75 mm x 28 mm Vision stent overlapping with the 12 mm length Vision stent    HX HEART CATHETERIZATION 02/09    with stenting of the total occluded right coronary artery    HX HEART VALVE SURGERY      1. Mitral valve repair with size 30 CE Physio ring. 2. Single LIMA to the LAD    HX HEMORRHOIDECTOMY      HX HYSTERECTOMY      HX PACEMAKER  10/13/10    dual-chamber Medtronic AICD without left ventricular lead       Current Outpatient Prescriptions   Medication Sig Dispense Refill    isosorbide mononitrate ER (IMDUR) 120 mg CR tablet Take 1 Tab by mouth daily. 30 Tab 6    Ca-D3-mag ox-zinc--romina-bor (CALCIUM 600-D3 PLUS) 600 mg calcium- 800 unit-50 mg tab Take 1 Each by mouth daily.  b complex vitamins (B COMPLEX 1) tablet Take 1 Tab by mouth daily.  atorvastatin (LIPITOR) 20 mg tablet Take 1 Tab by mouth nightly. 30 Tab 0    hydrALAZINE (APRESOLINE) 25 mg tablet Take 1 Tab by mouth three (3) times daily. 90 Tab 0    carvedilol (COREG) 25 mg tablet Take 1 Tab by mouth two (2) times daily (with meals). 60 Tab 6    fluticasone (FLONASE) 50 mcg/actuation nasal spray 2 Sprays by Both Nostrils route nightly.  omeprazole (PRILOSEC) 20 mg capsule Take 20 mg by mouth daily.  ferrous sulfate (IRON) 325 mg (65 mg iron) tablet Take 650 mg by mouth daily.  flaxseed oil 1,000 mg cap Take  by mouth two (2) times a day.  LACTOBACILLUS ACIDOPHILUS (PROBIOTIC PO) Take  by mouth two (2) times a day. 2 tabs bid      BUMETANIDE (BUMEX PO) Take 1 mg by mouth as directed. Take one tablet daily and as directed if weight up 2+ pounds       potassium chloride (K-DUR) 20 mEq tablet Take 20 mEq by mouth two (2) times a day.  CRANBERRY EXT/C/L. SPOROGENES (CRANBERRY-PROBIOTICS-VITAMIN C PO) Take 1,500 mg by mouth two (2) times a day. 3 tablets      aspirin 81 mg tablet Take 81 mg by mouth daily.  levothyroxine (LEVOXYL) 50 mcg tablet Take 50 mcg by mouth daily (before breakfast).  nitroglycerin (NITROSTAT) 0.4 mg SL tablet 0.4 mg by SubLINGual route every five (5) minutes as needed.  for chest pain         EKG: unchanged from previous tracings, nonspecific ST and T waves changes, atrially paced rhythm,LVH/Strain  . ASSESSMENT and PLAN  Encounter Diagnoses   Name Primary?  Angina of effort ([address] Yes    Ischemic cardiomyopathy, CABG X1 LIMA to the LAD 1563/ 32%     Systolic CHF, acute on chronic (HCC)     Mitral regurgitation, s/p MV repair # 30 Physio Ring     Pulmonary hypertension ([address]    She is compensated and shows no signs of decompensated congestive heart failure at this time by physical examination and by normal OptiVol on interrogating her ICD. She continues with frequent exertional anginal chest pain. We discussed the invasive diagnostic workup with cardiac catheterization and its risk. The patient has decided against the cardiac catheterization, which I concurred with. I recommended adding Ranexa to her current medical regimen; however, the patient decided not to take Ranexa because of its high cost.  She has decided to continue on the current medical regimen without further diagnostic workup or changing her medications. At this point, I would continue on the current medical regimen.

## 2017-06-05 NOTE — MR AVS SNAPSHOT
Visit Information Date & Time Provider Department Dept. Phone Encounter #  
 6/5/2017  1:40 PM Dedra Wang MD [facility]  Upcoming Health Maintenance Date Due DTaP/Tdap/Td series (1 - Tdap) 6/21/1949 ZOSTER VACCINE AGE 60> 6/21/1988 GLAUCOMA SCREENING Q2Y 6/21/1993 OSTEOPOROSIS SCREENING (DEXA) 6/21/1993 Pneumococcal 65+ High/Highest Risk (1 of 2 - PCV13) 6/21/1993 MEDICARE YEARLY EXAM 6/21/1993 INFLUENZA AGE 9 TO ADULT 8/1/2017 Allergies as of 6/5/2017  Review Complete On: 6/5/2017 By: Dedra Wang MD  
  
 Severity Noted Reaction Type Reactions Ace Inhibitors    Swelling  
 wheezing Ciprofloxacin    Nausea Only Dizziness, nausea Codeine    Nausea Only Cozaar [Losartan]    Swelling Tongue edema and generalized swelling Darvocet A500 [Propoxyphene N-acetaminophen]    Other (comments) Dizziness Gabapentin    Swelling (Neurontin) Lisinopril    Swelling Throat swells Magox [Magnesium Oxide]  03/22/2011    Diarrhea Norvasc [Amlodipine]    Swelling Tongue edema and generalized swelling Smz-tmp Ds [Sulfamethoxazole-trimethoprim]    Swelling Throat swells Current Immunizations  Never Reviewed No immunizations on file. Not reviewed this visit You Were Diagnosed With   
  
 Codes Comments [facility]    -  Primary ICD-10-CM: I20.8 ICD-9-CM: 413.9 Ischemic cardiomyopathy     ICD-10-CM: I25.5 ICD-9-CM: 414.8 Systolic CHF, acute on chronic (HCC)     ICD-10-CM: I91.35 ICD-9-CM: 428.23, 428.0 S/P mitral valve repair     ICD-10-CM: G61.802 ICD-9-CM: V45.89 Vitals BP Pulse Height(growth percentile) Weight(growth percentile) SpO2 BMI  
 142/60 62 5' 1\" (1.549 m) 115 lb (52.2 kg) 97% 21.73 kg/m2 OB Status Smoking Status Hysterectomy Never Smoker Vitals History BMI and BSA Data Body Mass Index Body Surface Area 21.73 kg/m 2 1.5 m 2 Preferred Pharmacy Pharmacy Name [facility] [address],[facility] [address] [place] [address] [phone] Your Updated Medication List  
  
   
This list is accurate as of: 6/5/17  2:41 PM.  Always use your most recent med list.  
  
  
  
  
 aspirin 81 mg tablet Take 81 mg by mouth daily. atorvastatin 20 mg tablet Commonly known as:  LIPITOR Take 1 Tab by mouth nightly. B COMPLEX 1 tablet Generic drug:  b complex vitamins Take 1 Tab by mouth daily. BUMEX PO Take 1 mg by mouth as directed. Take one tablet daily and as directed if weight up 2+ pounds CALCIUM 600-D3 PLUS 600 mg calcium- 800 unit-50 mg Tab Generic drug:  Ca-D3-mag ox-zinc--romina-bor Take 1 Each by mouth daily. carvedilol 25 mg tablet Commonly known as:  Cleda James Take 1 Tab by mouth two (2) times daily (with meals). CRANBERRY-PROBIOTICS-VITAMIN C PO Take 1,500 mg by mouth two (2) times a day. 3 tablets  
  
 flaxseed oil 1,000 mg Cap Take  by mouth two (2) times a day. FLONASE 50 mcg/actuation nasal spray Generic drug:  fluticasone 2 Sprays by Both Nostrils route nightly. hydrALAZINE 25 mg tablet Commonly known as:  APRESOLINE Take 1 Tab by mouth three (3) times daily. Iron 325 mg (65 mg iron) tablet Generic drug:  ferrous sulfate Take 650 mg by mouth daily. isosorbide mononitrate  mg CR tablet Commonly known as:  IMDUR Take 1 Tab by mouth daily. K-DUR 20 mEq tablet Generic drug:  potassium chloride Take 20 mEq by mouth two (2) times a day. LEVOXYL 50 mcg tablet Generic drug:  levothyroxine Take 50 mcg by mouth daily (before breakfast). nitroglycerin 0.4 mg SL tablet Commonly known as:  NITROSTAT  
0.4 mg by SubLINGual route every five (5) minutes as needed. for chest pain PriLOSEC 20 mg capsule Generic drug:  omeprazole Take 20 mg by mouth daily. PROBIOTIC PO Take  by mouth two (2) times a day. 2 tabs bid We Performed the Following AMB POC EKG ROUTINE W/ 12 LEADS, INTER & REP [77324 CPT(R)] Introducing [facility]! Dear Zackary Landers: Thank you for requesting a "[employer] account. Our records indicate that you have previously registered for a "[employer] account but its currently inactive. Please call our "[employer] support line at [phone]. Additional Information If you have questions, please visit the Frequently Asked Questions section of the "[employer] website at [email or web address] Remember, "[employer] is NOT to be used for urgent needs. For medical emergencies, dial 911. Now available from your iPhone and Android! Please provide this summary of care documentation to your next provider. Your primary care clinician is listed as Morena Simmons. If you have any questions after today's visit, please call [phone].

## 2017-06-19 NOTE — PROGRESS NOTES
I have personally seen and evaluated the device findings. Interrogation reviewed and I agree with assessment.     Shyanne Biswas

## 2017-07-22 PROBLEM — R07.9 CHEST PAIN: Status: ACTIVE | Noted: 2017-01-01

## 2017-07-22 PROBLEM — R09.02 HYPOXIA: Status: ACTIVE | Noted: 2017-01-01

## 2017-07-22 NOTE — H&P
Admission History and Physical  [facility]      Patient: Ling Melendez MRN: [id number]  CSN: [id number]    YOB: 1928  Age: 80 y.o. Sex: female      DOA: 7/22/2017       HPI:     Ling Melendez is a 80 y.o. female with PMH significant for CAD with MI, mitral valve repair, ICD placement, systolic CHF, rheumatoid arthritis and HTN now presenting with complaint of chest pain. The patient notes over the last week she has had constant chest pressure associated with SOB and an occasional burning pain. The patient endorses chest pressure at rest that worsens with exertion. Her shortness of breath is also made worse with exertion. She endorsed weakness for the past week. Please see ROS for other pertinent positives and negatives. ED Course: CXR with no acute process, EKG NSR with HR 75/min with no STEMI but non specific T wave inversions in V5 and V6. Troponin 0.03. The patients O2 saturations decreased to the mid-high 80's several times while in the ED, improving to 100% with 2L of oxygen. Review of Systems   Constitutional: Positive for chills. Negative for fever. HENT: Positive for congestion and hearing loss. Dysphagia   Eyes: Negative for blurred vision and double vision. Respiratory: Positive for shortness of breath. Negative for sputum production. Morning cough   Cardiovascular: Positive for chest pain, leg swelling and PND. Negative for palpitations. Gastrointestinal: Positive for abdominal pain, heartburn and nausea. Negative for blood in stool, constipation, diarrhea and vomiting. Musculoskeletal: Positive for joint pain. Negative for falls. Skin: Negative for rash. Neurological: Positive for tingling and weakness. Negative for dizziness and headaches.         Occasional dizziness when \"I turn\"        Past Medical History:   Diagnosis Date    Actinic keratoses     Angioedema 2003    and respiratory failure from reaction to lisinopril  Atypical chest pain     Cardiac cath 02/06/2009    mRCA 100% (overlapping 2.75 x 28 & 2.75 x 12 Vision BMS). LM patent. mLAD 80%. CX 25%. OM1 100%. LVEDP 28.  EF 45%. Inferobasal akn. MR 3+. Left RA 50%. RA 7.  RV 40/4. PA 37/13. W 18.  CO/CI 5.0/3.2 (TD); 4.0 x 2.6 (Dick).  Cardiac echocardiogram 01/15/2017    Severe LVE. EF 20%. Severe diffuse hypk. Indeterminate diastolic fx. RVSP 30 mmHg. Mild DORIS. Mod MR. Mod PI.  IVCE. Left pleural effusion. Similar to study of 2/9/10.  Cardiac nuclear imaging test, abnormal 09/27/2010    Mod basal inferior, early mid inferior infarction w/o ischemia. Mild mid anterior artifact vs infarction w/o ischemia. Massive LVE. EF 18%. Marked septal, inferior hypk. Inferoapical, inferoseptal dysk. Anterior hypk. RVE suggested.  Cardiovascular ankle brachial index 03/28/2014    No arterial disease at rest bilaterally. R АНДРЕЙ 1.02.  L АНДРЕЙ 1.05.  R DBI 0.48. L DBI 0.64. Exercise deferred.  Cardiovascular RLE venous duplex 01/14/2017    Right leg:  No DVT.  Carotid duplex 03/28/2014    Mild <50% bilateral ICA plaquing.       Coronary artery disease     Most likely myocardial infarction 12-08; stenting of the RCA and LIMA to LAD with bypass surgery later that year    Dyslipidemia     GERD (gastroesophageal reflux disease)     Heart failure 6/09,2/10,11/10    readmission to hospital 6/09, 2/10, 11/10    Hypertension     Hypothyroidism     Incomplete bundle branch block     left    Intraventricular conduction delay     with QRS duration of 120 msec with incomplete left BBB    Irritable bowel syndrome     Ischemic cardiomyopathy     EF now in the 15% to 20% range, last assessed September 2010 by nuclear stress test.    Mitral regurgitation     moderately severe, s/p mitral valve repair in 05/2009    Osteoarthritis of knee     Pacemaker 10/10    dual-chamber ICD placed, with LV lead placement with current RV pacing worsening mitral regurgitation    Pulmonary hypertension (HCC)     moderate    Renal artery stenosis (HCC)     Respiratory failure ([address] 2003    Rheumatoid arthritis (HCC)     Systolic CHF, chronic ([facility])     class III to IV, with multiple recurrent admissions in the past year.  Thrombocytopenia ([facility]        Past Surgical History:   Procedure Laterality Date    HX BLADDER REPAIR      HX CORONARY STENT PLACEMENT  2/6/09    totally occluded right coronary artery was stented successfully with 2.75 mm x 28 mm Vision stent overlapping with the 12 mm length Vision stent    HX HEART CATHETERIZATION  02/09    with stenting of the total occluded right coronary artery    HX HEART VALVE SURGERY      1. Mitral valve repair with size 30 CE Physio ring.   2. Single LIMA to the LAD    HX HEMORRHOIDECTOMY      HX HYSTERECTOMY      HX PACEMAKER  10/13/10    dual-chamber Medtronic AICD without left ventricular lead       Family History   Problem Relation Age of Onset    Hypertension Sister        Social History     Social History    Marital status:      Spouse name: N/A    Number of children: N/A    Years of education: N/A     Social History Main Topics    Smoking status: Never Smoker    Smokeless tobacco: Never Used    Alcohol use No    Drug use: No    Sexual activity: Not on file     Other Topics Concern    Not on file     Social History Narrative       Allergies   Allergen Reactions    Ace Inhibitors Swelling     wheezing    Ciprofloxacin Nausea Only     Dizziness, nausea    Codeine Nausea Only    Cozaar [Losartan] Swelling     Tongue edema and generalized swelling    Darvocet A500 [Propoxyphene N-Acetaminophen] Other (comments)     Dizziness    Gabapentin Swelling     (Neurontin)    Lisinopril Swelling     Throat swells      Magox [Magnesium Oxide] Diarrhea    Norvasc [Amlodipine] Swelling     Tongue edema and generalized swelling    Smz-Tmp Ds [Sulfamethoxazole-Trimethoprim] Swelling     Throat swells Prior to Admission Medications   Prescriptions Last Dose Informant Patient Reported? Taking? BUMETANIDE (BUMEX PO) 2017 at 0800  Yes Yes   Sig: Take 1 mg by mouth as directed. Take one tablet daily and as directed if weight up 2+ pounds    CRANBERRY EXT/C/L. SPOROGENES (CRANBERRY-PROBIOTICS-VITAMIN C PO) 2017 at 0800  Yes Yes   Sig: Take 1,500 mg by mouth two (2) times a day. 3 tablets   Ca-D3-mag ox-zinc--romina-bor (CALCIUM 600-D3 PLUS) 600 mg calcium- 800 unit-50 mg tab 2017 at 1800  Yes Yes   Sig: Take 1 Each by mouth daily. LACTOBACILLUS ACIDOPHILUS (PROBIOTIC PO) 2017 at 0800  Yes Yes   Sig: Take  by mouth two (2) times a day. 2 tabs bid   aspirin 81 mg tablet 2017 at 0800  Yes Yes   Sig: Take 81 mg by mouth daily. atorvastatin (LIPITOR) 20 mg tablet 7/15/2017 at 2000  No Yes   Sig: Take 1 Tab by mouth nightly. b complex vitamins (B COMPLEX 1) tablet 2017 at 0800  Yes Yes   Sig: Take 1 Tab by mouth daily. carvedilol (COREG) 25 mg tablet 2017 at 0800  No Yes   Sig: Take 1 Tab by mouth two (2) times daily (with meals). ferrous sulfate (IRON) 325 mg (65 mg iron) tablet 2017 at 0800  Yes Yes   Sig: Take 650 mg by mouth daily. flaxseed oil 1,000 mg cap 2017 at 1800  Yes Yes   Sig: Take  by mouth two (2) times a day. fluticasone (FLONASE) 50 mcg/actuation nasal spray 2017 at 2000  Yes Yes   Si Sprays by Both Nostrils route nightly. hydrALAZINE (APRESOLINE) 25 mg tablet 2017 at 1800  No Yes   Sig: Take 1 Tab by mouth three (3) times daily. isosorbide mononitrate ER (IMDUR) 120 mg CR tablet 2017 at 0800  No Yes   Sig: Take 1 Tab by mouth daily. levothyroxine (LEVOXYL) 50 mcg tablet 2017 at 0800  Yes No   Sig: Take 50 mcg by mouth daily (before breakfast). nitroglycerin (NITROSTAT) 0.4 mg SL tablet Unknown at Unknown time  Yes No   Si.4 mg by SubLINGual route every five (5) minutes as needed.  for chest pain omeprazole (PRILOSEC) 20 mg capsule 7/18/2017 at 0800  Yes Yes   Sig: Take 20 mg by mouth daily. potassium chloride (K-DUR) 20 mEq tablet 7/21/2017 at 2100  Yes Yes   Sig: Take 20 mEq by mouth two (2) times a day. Facility-Administered Medications: None       Physical Exam:     Patient Vitals for the past 24 hrs:   Temp Pulse Resp BP SpO2   07/22/17 1631 97.5 °F (36.4 °C) 73 16 156/71 98 %   07/22/17 1330 - 72 - 153/70 98 %   07/22/17 1315 - 71 - 161/74 99 %   07/22/17 1300 - 76 - 152/85 (!) 89 %   07/22/17 1245 - 70 - 129/65 100 %   07/22/17 1230 - 82 - 155/61 (!) 85 %   07/22/17 1229 - 79 - - (!) 82 %   07/22/17 1130 - 67 - 154/64 94 %   07/22/17 1115 - 70 24 158/67 94 %   07/22/17 1110 98.3 °F (36.8 °C) 77 18 161/61 95 %   07/22/17 1108 - 75 14 161/61 94 %       Physical Exam   Constitutional: She appears well-developed. No distress. HENT:   Head: Normocephalic. Eyes: Right eye exhibits no discharge. Left eye exhibits no discharge. Cardiovascular: Normal rate and regular rhythm. Exam reveals no gallop and no friction rub. No murmur heard. Pulmonary/Chest: Effort normal. No respiratory distress. She has no wheezes. She exhibits no tenderness. Course crackles at bases   Abdominal: Soft. Bowel sounds are normal. She exhibits no distension and no mass. There is no tenderness. There is no rebound and no guarding. No hernia. Genitourinary: Rectal exam shows guaiac negative stool. Genitourinary Comments: Small internal hemorrhoids    Musculoskeletal: She exhibits no edema, tenderness or deformity. Positive homans sign R calf   Neurological: She is alert. She has normal reflexes. No cranial nerve deficit. She exhibits normal muscle tone. Skin: Skin is warm and dry. No rash noted. She is not diaphoretic. No erythema. Psychiatric: She has a normal mood and affect. Thought content normal.       IMAGING:   CXR 07/22/2017  IMPRESSION:      1.  Stable moderate enlargement of the cardiac silhouette.     2. Persistent prominence of the bronchovascular markings, possibly chronic  interstitial lung disease or chronic bronchitis. Recurrent pulmonary vascular  congestion not excluded.     3. Persistent bibasilar hazy opacities and blunted costophrenic sulci,  compatible with small effusions with overlying atelectasis. Superimposed  infiltrate/edema not excluded    Recent Results (from the past 12 hour(s))   EKG, 12 LEAD, INITIAL    Collection Time: 07/22/17 11:05 AM   Result Value Ref Range    Ventricular Rate 75 BPM    Atrial Rate 75 BPM    P-R Interval 180 ms    QRS Duration 118 ms    Q-T Interval 424 ms    QTC Calculation (Bezet) 473 ms    Calculated P Axis 85 degrees    Calculated R Axis 114 degrees    Calculated T Axis 127 degrees    Diagnosis       Normal sinus rhythm  Right axis deviation  Pulmonary disease pattern  Incomplete left bundle branch block  ST & T wave abnormality, consider lateral ischemia  Prolonged QT  Abnormal ECG    Confirmed by Gayatri Newsome MD, Oleg Yañez ([id number]) on 7/22/2017 12:17:05 PM     CBC WITH AUTOMATED DIFF    Collection Time: 07/22/17 11:19 AM   Result Value Ref Range    WBC 4.1 (L) 4.6 - 13.2 K/uL    RBC 3.13 (L) 4.20 - 5.30 M/uL    HGB 9.6 (L) 12.0 - 16.0 g/dL    HCT 29.3 (L) 35.0 - 45.0 %    MCV 93.6 74.0 - 97.0 FL    MCH 30.7 24.0 - 34.0 PG    MCHC 32.8 31.0 - 37.0 g/dL    RDW 13.1 11.6 - 14.5 %    PLATELET 980 (L) 757 - 420 K/uL    MPV 9.9 9.2 - 11.8 FL    NEUTROPHILS 60 40 - 73 %    LYMPHOCYTES 24 21 - 52 %    MONOCYTES 13 (H) 3 - 10 %    EOSINOPHILS 2 0 - 5 %    BASOPHILS 1 0 - 2 %    ABS. NEUTROPHILS 2.5 1.8 - 8.0 K/UL    ABS. LYMPHOCYTES 1.0 0.9 - 3.6 K/UL    ABS. MONOCYTES 0.6 0.05 - 1.2 K/UL    ABS. EOSINOPHILS 0.1 0.0 - 0.4 K/UL    ABS.  BASOPHILS 0.0 0.0 - 0.06 K/UL    DF AUTOMATED     METABOLIC PANEL, COMPREHENSIVE    Collection Time: 07/22/17 11:19 AM   Result Value Ref Range    Sodium 141 136 - 145 mmol/L    Potassium 3.7 3.5 - 5.5 mmol/L    Chloride 105 100 - 108 mmol/L    CO2 29 21 - 32 mmol/L    Anion gap 7 3.0 - 18 mmol/L    Glucose 98 74 - 99 mg/dL    BUN 27 (H) 7.0 - 18 MG/DL    Creatinine 1.47 (H) 0.6 - 1.3 MG/DL    BUN/Creatinine ratio 18 12 - 20      GFR est AA 41 (L) >60 ml/min/1.73m2    GFR est non-AA 33 (L) >60 ml/min/1.73m2    Calcium 9.1 8.5 - 10.1 MG/DL    Bilirubin, total 0.6 0.2 - 1.0 MG/DL    ALT (SGPT) 29 13 - 56 U/L    AST (SGOT) 26 15 - 37 U/L    Alk. phosphatase 24 (L) 45 - 117 U/L    Protein, total 6.4 6.4 - 8.2 g/dL    Albumin 3.3 (L) 3.4 - 5.0 g/dL    Globulin 3.1 2.0 - 4.0 g/dL    A-G Ratio 1.1 0.8 - 1.7     CARDIAC PANEL,(CK, CKMB & TROPONIN)    Collection Time: 07/22/17 11:19 AM   Result Value Ref Range    CK 81 26 - 192 U/L    CK - MB 1.4 <3.6 ng/ml    CK-MB Index 1.7 0.0 - 4.0 %    Troponin-I, Qt. 0.03 0.0 - 0.045 NG/ML   URINALYSIS W/ RFLX MICROSCOPIC    Collection Time: 07/22/17 12:35 PM   Result Value Ref Range    Color YELLOW      Appearance CLEAR      Specific gravity 1.017 1.005 - 1.030      pH (UA) 8.5 (H) 5.0 - 8.0      Protein 30 (A) NEG mg/dL    Glucose NEGATIVE  NEG mg/dL    Ketone NEGATIVE  NEG mg/dL    Bilirubin NEGATIVE  NEG      Blood NEGATIVE  NEG      Urobilinogen 0.2 0.2 - 1.0 EU/dL    Nitrites NEGATIVE  NEG      Leukocyte Esterase NEGATIVE  NEG     URINE MICROSCOPIC ONLY    Collection Time: 07/22/17 12:35 PM   Result Value Ref Range    WBC 0 to 3 0 - 4 /hpf    RBC NONE 0 - 5 /hpf    Epithelial cells FEW 0 - 5 /lpf    Bacteria FEW (A) NEG /hpf         Assessment/Plan:   Azael Mcdonough is a 80 y.o. female with PMH significant for CAD with MI, mitral valve repair, ICD placement, systolic CHF, rheumatoid arthritis and HTN now presenting with complaint of chest pain. Chest pain with Hypoxia. Mostly likely cardiac disease vs PE. EKG on admission not indicative of acute disease, CXR not indicative of acute process (persistent bronchovascular markings and bibasilar hazy opacities). Troponin 0.03.  Patient sees Dr. Katie Kimball (cardiologist) outpatient. - Patient has a positive homans sign. PVL to rule out DVT. Wells criteria for DVT 1 (recently much less mobile), which confers a moderate likelihood for DVT  - Cardiology consult for recommendations on management   - Echo to rule out acute changes  - Tele monitoring  - Nitrostat PRN  - Morphine . 5 mg once    CHF Last echo 01/2017 with EF 20% and severe diffuse hypokinesis. - Fluid restricted diet  - Imdur  - Torasmide   - I/Os    CAD PMH with MI, mitral valve repair, stent placement and AICD placement. - Continue home atorvastatin   - Continue home aspirin  - Continue home carvidilol  - Prolonged Qtc. Cautious use of anti-emetics    CKD IV with stable anemia Cr 1.47 BUN 27 on admission, at or below her baseline. Hemoglobin 9.6 (at baseline) Patient follows with Dr. Cassie Castellanos as an outpatient. - Continue home Ferrous sulfate   - Hemoccult negative  - Daily CBC, BMP    HTN and Hyperlipidemia  - Hydralazine 50 mg TID (increased from 25 per cardiology recommendations)    Hypothyroidism TSH 6/27/2017 WNL  - Continue home synthroid    GERD patient notes she was diagnosed with a hiatal hernia and frequent eructations. Does not see GI.  - Protonix 40 mg QD  - ENT follow up as an outpatient      Diet: Cardiac with fluid restriction  DVT Prophylaxis: Heparin  Code Status: DNR    Disposition and anticipated LOS: <2 midnights. Mesfin Thapa MD   PGY-1 [facility]  07/22/17 5:44 PM          Senior Addendum to History and Physical  [facility]        Patient: Ayde Orozco MRN: [id number]  CSN: [id number]    YOB: 1928  Age: 80 y.o. Sex: female       DOA: 7/22/2017      HPI:      Ayde Orozco is a 80 y.o. female with PMH chronic combined CHF, CKD-4, HTN, hypothyroidism, anemia, arthritis , now presenting with complaint of chest pain and dyspnea.  Ms. Ventura Figueroa reported that on 7/17/17, she started having chest pain and SOB. She has a history of chest pain and SOB, so she continued daily activities. However, chest pain and dyspnea worsened throughout the week. On 7/21/17, the chest pain \"got deeper. \" Also endorsed some lightheadedness, dizziness and nausea. Since being placed on supplemental O2, Ms. Mamadou Jacobo notes improvement of symptoms.      Physical Exam:      Physical Exam:  General:  Alert and Responsive and in No acute distress. HEENT: Conjunctiva pink, sclera anicteric. PERRL. EOMI. Pharynx moist, nonerythematous. MMM. CV:  RRR, no murmurs. PMI not displaced. JVD noted. RESP:  Unlabored breathing. Crackles noted along left posterior lung base. Equal expansion bilaterally. ABD:  Soft, nontender, nondistended. Normoactive bowel sounds. No hepatosplenomegaly. No suprapubic tenderness. MS:  No joint deformity or instability. No atrophy. Neuro:  5/5 strength bilateral upper extremities and lower extremities. CN II-XII intact. Sensation grossly intact. 2+ patellar reflexes bilaterally. A+Ox3. Ext:  No edema. 2+ radial and dp pulses bilaterally. Skin:  No rashes, lesions, or ulcers. Good turgor.        Assessment/Plan:   80 y.o. female with PMH chronic combined CHF, CKD-4, HTN, hypothyroidism, anemia, arthritis , now admitted with chest pain and hypoxia.      Hypoxia, chest pain:O2 saturation decreased to mid 80's on room air, improved on 2L.  Extensive cardiac history which includes a 1/15/2017 echocardiogram with an EF of 20%, a dual-chamber AICD in place, previous RCA stents and LIMA to her LAD, mitral valve regurgitation, and non-sustained V-Tach and prolonged QT interval. Most likely a result of cardiac disease vs. PE.  -- admit to tele  -- follows Dr. Fahad Quesada as outpatient; consulted Dr. Alfredo Holman (Cards)  -- Cards: patient doesn't want dialysis, will not pursue cardiac cath. echo ordered, increase  hydralazine 25mg TID to 50mg TID  -- Continue supplemental O2; will evaluate for home O2  -- PVLs ordered  -- EKG showed NSR, right axis deviation, incomplete LBBB, ST and T wave abnormality, consider lateral ischemia  -- Cardiac enzymes WNL x1  -- Chest Xray showed stable enlargement of cardiac silhouette, persistent prominence of broncho vascular markings and persistent bibasilar hazy opacities compatible with small effusions. -- Continue home bumex 1mg qday, carvedilol 25mg BID,imdur 120mg qday, atorvastatin 10mg qhs  -- continue home nitro 0.4mg PRN  -- I/Os     CKD-4: Cr 1.47 (baseline ~1.4)  -- Monitor renal fx, volume status.   -- daily BMP      Prolonged QTC:7/22/17   -- Cautious use of antiemetics      Hypothyroidism:TSH WNL (6/27/17)   -- continue current home dose levothyroxine 50mcg qday      Normocytic anemia:Hgb 9.6; baseline 10.  -- hemoccult negative (per intern's exam)  -- daily CBCs     GERD:  -- continue home omeprazole 20mg qday      Mild protein calorie malnutrition:albumin 3.3   -- Nutrition consult.     Active Problems:    Chest pain (7/22/2017)       Hypoxia (7/22/2017)           Orquidea Parada MD PGY-2  7/22/2017, 2:09 PM

## 2017-07-22 NOTE — ED TRIAGE NOTES
Pt c/o CP times one heart, 2 nitro and 324 mg of ASA given pt aao*4, pacemaker noted no s/s of respiratory distress, pt ambulatory slow but steady gait, pt did go into ST at 120 pt then converted to SR,

## 2017-07-22 NOTE — IP AVS SNAPSHOT
Jennifer Rivero 
 
 
 [address] [address] Aiyana Devries Patient: Giuseppe Bailey MRN: [id number] ATH:5/26/[id number] Current Discharge Medication List  
  
START taking these medications Dose & Instructions Dispensing Information Comments Morning Noon Evening Bedtime  
 lidocaine 4 % topical cream  
Commonly known as:  ASPERCREME (LIDOCAINE) Your last dose was: Your next dose is:    
   
   
 Apply  to affected area two (2) times daily as needed for Pain. Apply on skin around the pacemaker. Quantity:  1 Tube Refills:  1 CONTINUE these medications which have CHANGED Dose & Instructions Dispensing Information Comments Morning Noon Evening Bedtime  
 hydrALAZINE 25 mg tablet Commonly known as:  APRESOLINE What changed:  how much to take Your last dose was: Your next dose is:    
   
   
 Dose:  75 mg Take 3 Tabs by mouth three (3) times daily for 30 days. Quantity:  270 Tab Refills:  1 CONTINUE these medications which have NOT CHANGED Dose & Instructions Dispensing Information Comments Morning Noon Evening Bedtime  
 aspirin 81 mg tablet Your last dose was: Your next dose is:    
   
   
 Dose:  81 mg Take 81 mg by mouth daily. Refills:  0  
     
   
   
   
  
 atorvastatin 20 mg tablet Commonly known as:  LIPITOR Your last dose was: Your next dose is:    
   
   
 Dose:  20 mg Take 1 Tab by mouth nightly. Quantity:  30 Tab Refills:  0  
     
   
   
   
  
 B COMPLEX 1 tablet Generic drug:  b complex vitamins Your last dose was: Your next dose is:    
   
   
 Dose:  1 Tab Take 1 Tab by mouth daily. Refills:  0 BUMEX PO Your last dose was: Your next dose is:    
   
   
 Dose:  1 mg Take 1 mg by mouth as directed. Take one tablet daily and as directed if weight up 2+ pounds Refills:  0  
     
   
   
   
  
 CALCIUM 600-D3 PLUS 600 mg calcium- 800 unit-50 mg Tab Generic drug:  Ca-D3-mag ox-zinc--romina-bor Your last dose was: Your next dose is:    
   
   
 Dose:  1 Each Take 1 Each by mouth daily. Refills:  0  
     
   
   
   
  
 carvedilol 25 mg tablet Commonly known as:  Ramin Olmstead Your last dose was: Your next dose is:    
   
   
 Dose:  25 mg Take 1 Tab by mouth two (2) times daily (with meals). Quantity:  60 Tab Refills:  6 CRANBERRY-PROBIOTICS-VITAMIN C PO Your last dose was: Your next dose is:    
   
   
 Dose:  1500 mg Take 1,500 mg by mouth two (2) times a day. 3 tablets Refills:  0  
     
   
   
   
  
 flaxseed oil 1,000 mg Cap Your last dose was: Your next dose is: Take  by mouth two (2) times a day. Refills:  0  
     
   
   
   
  
 FLONASE 50 mcg/actuation nasal spray Generic drug:  fluticasone Your last dose was: Your next dose is:    
   
   
 Dose:  2 Spray 2 Sprays by Both Nostrils route nightly. Refills:  0 Iron 325 mg (65 mg iron) tablet Generic drug:  ferrous sulfate Your last dose was: Your next dose is:    
   
   
 Dose:  650 mg Take 650 mg by mouth daily. Refills:  0  
     
   
   
   
  
 isosorbide mononitrate  mg CR tablet Commonly known as:  IMDUR Your last dose was: Your next dose is:    
   
   
 Dose:  120 mg Take 1 Tab by mouth daily. Quantity:  30 Tab Refills:  6  
     
   
   
   
  
 K-DUR 20 mEq tablet Generic drug:  potassium chloride Your last dose was: Your next dose is:    
   
   
 Dose:  20 mEq Take 20 mEq by mouth two (2) times a day. Refills:  0 LEVOXYL 50 mcg tablet Generic drug:  levothyroxine Your last dose was: Your next dose is:    
   
   
 Dose:  50 mcg Take 50 mcg by mouth daily (before breakfast). Refills:  0  
     
   
   
   
  
 nitroglycerin 0.4 mg SL tablet Commonly known as:  NITROSTAT Your last dose was: Your next dose is:    
   
   
 Dose:  0.4 mg  
0.4 mg by SubLINGual route every five (5) minutes as needed. for chest pain Refills:  0 PriLOSEC 20 mg capsule Generic drug:  omeprazole Your last dose was: Your next dose is:    
   
   
 Dose:  20 mg Take 20 mg by mouth daily. Refills:  0 PROBIOTIC PO Your last dose was: Your next dose is: Take  by mouth two (2) times a day. 2 tabs bid Refills:  0 Where to Get Your Medications Information on where to get these meds will be given to you by the nurse or doctor. !  Ask your nurse or doctor about these medications  
  hydrALAZINE 25 mg tablet  
 lidocaine 4 % topical cream

## 2017-07-22 NOTE — IP AVS SNAPSHOT
Giowilmer Khannaes 
 
 
 [address] [address] Aiyana Devries Patient: Vivienne Garces MRN: [id number] HXK:9/15/4492 You are allergic to the following Allergen Reactions Ace Inhibitors Swelling  
 wheezing Ciprofloxacin Nausea Only Dizziness, nausea Codeine Nausea Only Cozaar (Losartan) Swelling Tongue edema and generalized swelling Darvocet A500 (Propoxyphene N-Acetaminophen) Other (comments) Dizziness Gabapentin Swelling (Neurontin) Lisinopril Swelling Throat swells Magox (Magnesium Oxide) Diarrhea Norvasc (Amlodipine) Swelling Tongue edema and generalized swelling Smz-Tmp Ds (Sulfamethoxazole-Trimethoprim) Swelling Throat swells Recent Documentation Height Weight BMI OB Status Smoking Status 1.6 m 57.2 kg 22.34 kg/m2 Hysterectomy Never Smoker Emergency Contacts Name Discharge Info Relation Home Work Mobile [address] CAREGIVER [3] Other Relative [6] [phone] Melisa Berry  Child [2] [phone] About your hospitalization You were admitted on:  July 22, 2017 You last received care in the:  [facility] [address] You were discharged on:  July 23, 2017 Unit phone number:  [phone] Why you were hospitalized Your primary diagnosis was:  Hypoxia Your diagnoses also included:  Chest Pain, Hypertension, Dyspnea, Vertigo, Chf (Congestive Heart Failure) (Hcc) Providers Seen During Your Hospitalizations Provider Role Specialty Primary office phone Keanu Saucedo DO Attending Provider Emergency Medicine [phone] Saint Mast, MD Attending Provider Family Practice [phone] Your Primary Care Physician (PCP) Primary Care Physician Office Phone Office Fax Kena Deleon [phone] Follow-up Information Follow up With Details Comments Contact Info Bro Gallardo MD Go in 2 days Cardiology ED visit follow up [address] [facility] 
[phone] [facility] EMERGENCY DEPT Go to As needed, If symptoms worsen [address] [place] [address] [facility] [address] Josias Miranda MD   [address] [address] 
[phone] Current Discharge Medication List  
  
START taking these medications Dose & Instructions Dispensing Information Comments Morning Noon Evening Bedtime  
 lidocaine 4 % topical cream  
Commonly known as:  ASPERCREME (LIDOCAINE) Your last dose was: Your next dose is:    
   
   
 Apply  to affected area two (2) times daily as needed for Pain. Apply on skin around the pacemaker. Quantity:  1 Tube Refills:  1 CONTINUE these medications which have CHANGED Dose & Instructions Dispensing Information Comments Morning Noon Evening Bedtime  
 hydrALAZINE 25 mg tablet Commonly known as:  APRESOLINE What changed:  how much to take Your last dose was: Your next dose is:    
   
   
 Dose:  75 mg Take 3 Tabs by mouth three (3) times daily for 30 days. Quantity:  270 Tab Refills:  1 CONTINUE these medications which have NOT CHANGED Dose & Instructions Dispensing Information Comments Morning Noon Evening Bedtime  
 aspirin 81 mg tablet Your last dose was: Your next dose is:    
   
   
 Dose:  81 mg Take 81 mg by mouth daily. Refills:  0  
     
   
   
   
  
 atorvastatin 20 mg tablet Commonly known as:  LIPITOR Your last dose was: Your next dose is:    
   
   
 Dose:  20 mg Take 1 Tab by mouth nightly. Quantity:  30 Tab Refills:  0  
     
   
   
   
  
 B COMPLEX 1 tablet Generic drug:  b complex vitamins Your last dose was: Your next dose is: Dose:  1 Tab Take 1 Tab by mouth daily. Refills:  0 BUMEX PO Your last dose was: Your next dose is:    
   
   
 Dose:  1 mg Take 1 mg by mouth as directed. Take one tablet daily and as directed if weight up 2+ pounds Refills:  0  
     
   
   
   
  
 CALCIUM 600-D3 PLUS 600 mg calcium- 800 unit-50 mg Tab Generic drug:  Ca-D3-mag ox-zinc--romina-bor Your last dose was: Your next dose is:    
   
   
 Dose:  1 Each Take 1 Each by mouth daily. Refills:  0  
     
   
   
   
  
 carvedilol 25 mg tablet Commonly known as:  Porsche Shade Your last dose was: Your next dose is:    
   
   
 Dose:  25 mg Take 1 Tab by mouth two (2) times daily (with meals). Quantity:  60 Tab Refills:  6 CRANBERRY-PROBIOTICS-VITAMIN C PO Your last dose was: Your next dose is:    
   
   
 Dose:  1500 mg Take 1,500 mg by mouth two (2) times a day. 3 tablets Refills:  0  
     
   
   
   
  
 flaxseed oil 1,000 mg Cap Your last dose was: Your next dose is: Take  by mouth two (2) times a day. Refills:  0  
     
   
   
   
  
 FLONASE 50 mcg/actuation nasal spray Generic drug:  fluticasone Your last dose was: Your next dose is:    
   
   
 Dose:  2 Spray 2 Sprays by Both Nostrils route nightly. Refills:  0 Iron 325 mg (65 mg iron) tablet Generic drug:  ferrous sulfate Your last dose was: Your next dose is:    
   
   
 Dose:  650 mg Take 650 mg by mouth daily. Refills:  0  
     
   
   
   
  
 isosorbide mononitrate  mg CR tablet Commonly known as:  IMDUR Your last dose was: Your next dose is:    
   
   
 Dose:  120 mg Take 1 Tab by mouth daily. Quantity:  30 Tab Refills:  6  
     
   
   
   
  
 K-DUR 20 mEq tablet Generic drug:  potassium chloride Your last dose was: Your next dose is:    
   
   
 Dose:  20 mEq Take 20 mEq by mouth two (2) times a day. Refills:  0 LEVOXYL 50 mcg tablet Generic drug:  levothyroxine Your last dose was: Your next dose is:    
   
   
 Dose:  50 mcg Take 50 mcg by mouth daily (before breakfast). Refills:  0  
     
   
   
   
  
 nitroglycerin 0.4 mg SL tablet Commonly known as:  NITROSTAT Your last dose was: Your next dose is:    
   
   
 Dose:  0.4 mg  
0.4 mg by SubLINGual route every five (5) minutes as needed. for chest pain Refills:  0 PriLOSEC 20 mg capsule Generic drug:  omeprazole Your last dose was: Your next dose is:    
   
   
 Dose:  20 mg Take 20 mg by mouth daily. Refills:  0 PROBIOTIC PO Your last dose was: Your next dose is: Take  by mouth two (2) times a day. 2 tabs bid Refills:  0 Where to Get Your Medications Information on where to get these meds will be given to you by the nurse or doctor. ! Ask your nurse or doctor about these medications  
  hydrALAZINE 25 mg tablet  
 lidocaine 4 % topical cream  
  
  
 
  
  
Discharge Instructions Patient armband removed and shredded. Discharge medications reviewed with the patient and appropriate educational materials and side effects teaching were provided. DISCHARGE SUMMARY from Nurse The following personal items are in your possession at time of discharge: 
 
Dental Appliances: Uppers, Lowers Visual Aid: Glasses, With patient Home Medications: None Jewelry: Pennelope Graft Clothing: At bedside, Pants, Shirt Other Valuables: None PATIENT INSTRUCTIONS: 
 
 
F-face looks uneven A-arms unable to move or move unevenly S-speech slurred or non-existent T-time-call 911 as soon as signs and symptoms begin-DO NOT go Back to bed or wait to see if you get better-TIME IS BRAIN. Warning Signs of HEART ATTACK Call 911 if you have these symptoms: 
? Chest discomfort. Most heart attacks involve discomfort in the center of the chest that lasts more than a few minutes, or that goes away and comes back. It can feel like uncomfortable pressure, squeezing, fullness, or pain. ? Discomfort in other areas of the upper body. Symptoms can include pain or discomfort in one or both arms, the back, neck, jaw, or stomach. ? Shortness of breath with or without chest discomfort. ? Other signs may include breaking out in a cold sweat, nausea, or lightheadedness. Don't wait more than five minutes to call [address]! Fast action can save your life. Calling 911 is almost always the fastest way to get lifesaving treatment. Emergency Medical Services staff can begin treatment when they arrive  up to an hour sooner than if someone gets to the hospital by car. The discharge information has been reviewed with the patient. The patient verbalized understanding. Discharge medications reviewed with the patient and appropriate educational materials and side effects teaching were provided. [employer] Activation Thank you for requesting access to [employer]. Please follow the instructions below to securely access and download your online medical record. [employer] allows you to send messages to your doctor, view your test results, renew your prescriptions, schedule appointments, and more. How Do I Sign Up? 1. In your internet browser, go to [email or web address] 
 2. Click on the First Time User? Click Here link in the Sign In box. You will be redirect to the New Member Sign Up page. 3. Enter your [facility] Access Code exactly as it appears below. You will not need to use this code after youve completed the sign-up process. If you do not sign up before the expiration date, you must request a new code. [facility] Access Code: [email or web address] Expires: 10/21/2017  8:12 PM (This is the date your [facility] access code will ) 4. Enter the last four digits of your Social Security Number (xxxx) and Date of Birth (mm/dd/yyyy) as indicated and click Submit. You will be taken to the next sign-up page. 5. Create a [facility]ama ID. This will be your [facility] login ID and cannot be changed, so think of one that is secure and easy to remember. 6. Create a [facility]ama password. You can change your password at any time. 7. Enter your Password Reset Question and Answer. This can be used at a later time if you forget your password. 8. Enter your e-mail address. You will receive e-mail notification when new information is available in [address] 9. Click Sign Up. You can now view and download portions of your medical record. 10. Click the Download Summary menu link to download a portable copy of your medical information. Additional Information If you have questions, please visit the Frequently Asked Questions section of the [employer] website at [email or web address] Remember, [employer] is NOT to be used for urgent needs. For medical emergencies, dial 911. Discharge Instructions Attachments/References HYDRALAZINE (BY MOUTH) (ENGLISH) LIDOCAINE (ON THE SKIN) (ENGLISH) Discharge Orders None [facility] Announcement We are excited to announce that we are making your provider's discharge notes available to you in Man[employer].   You will see these notes when they are completed and signed by the physician that discharged you from your recent hospital stay. If you have any questions or concerns about any information you see in Care.com, please call the Health Information Department where you were seen or reach out to your Primary Care Provider for more information about your plan of care. Introducing [facility]! Greer Oconnell introduces Care.com patient portal. Now you can access parts of your medical record, email your doctor's office, and request medication refills online. 1. In your internet browser, go to [email or web address] 2. Click on the First Time User? Click Here link in the Sign In box. You will see the New Member Sign Up page. 3. Enter your Care.com Access Code exactly as it appears below. You will not need to use this code after youve completed the sign-up process. If you do not sign up before the expiration date, you must request a new code. · Care.com Access Code: [email or web address] Expires: 10/21/2017  8:12 PM 
 
4. Enter the last four digits of your Social Security Number (xxxx) and Date of Birth (mm/dd/yyyy) as indicated and click Submit. You will be taken to the next sign-up page. 5. Create a Care.com ID. This will be your Care.com login ID and cannot be changed, so think of one that is secure and easy to remember. 6. Create a Care.com password. You can change your password at any time. 7. Enter your Password Reset Question and Answer. This can be used at a later time if you forget your password. 8. Enter your e-mail address. You will receive e-mail notification when new information is available in [address] 9. Click Sign Up. You can now view and download portions of your medical record. 10. Click the Download Summary menu link to download a portable copy of your medical information. If you have questions, please visit the Frequently Asked Questions section of the Care.com website. Remember, Care.com is NOT to be used for urgent needs. For medical emergencies, dial 911. Now available from your iPhone and Android! General Information Please provide this summary of care documentation to your next provider. Patient Signature:  ____________________________________________________________ Date:  ____________________________________________________________  
  
Arna Claus Provider Signature:  ____________________________________________________________ Date:  ____________________________________________________________ More Information Hydralazine (By mouth) Hydralazine Hydrochloride (kvu-OYSL-g-zeen kraig-droe-KLOR-akbar) Treats high blood pressure. Brand Name(s):  
There may be other brand names for this medicine. When This Medicine Should Not Be Used: This medicine is not right for everyone. Do not use it if you had an allergic reaction to hydralazine, or if you have coronary artery disease or rheumatic heart disease. How to Use This Medicine:  
Tablet · Take your medicine as directed. Your dose may need to be changed several times to find what works best for you. · Missed dose: Take a dose as soon as you remember. If it is almost time for your next dose, wait until then and take a regular dose. Do not take extra medicine to make up for a missed dose. · Store the medicine in a closed container at room temperature, away from heat, moisture, and direct light. Drugs and Foods to Avoid: Ask your doctor or pharmacist before using any other medicine, including over-the-counter medicines, vitamins, and herbal products. · Some foods and medicines can affect how hydralazine works. Tell your doctor if you are using diazoxide or an MAO inhibitor. Warnings While Using This Medicine: · Tell your doctor if you are pregnant or breastfeeding, or if you have kidney disease, heart or blood vessel disease, heart rhythm problems, lupus, or if you had a heart attack or stroke. · This medicine may cause the following problems: ¨ Lupus-like syndrome ¨ Changes in heart rhythm ¨ Nerve problems · This medicine may lower your blood pressure too much and cause you to feel dizzy. Do not drive or do anything else that could be dangerous until you know how this medicine affects you. · Your doctor will do lab tests at regular visits to check on the effects of this medicine. Keep all appointments. · Keep all medicine out of the reach of children. Never share your medicine with anyone. Possible Side Effects While Using This Medicine:  
Call your doctor right away if you notice any of these side effects: · Allergic reaction: Itching or hives, swelling in your face or hands, swelling or tingling in your mouth or throat, chest tightness, trouble breathing · Change in how much or how often you urinate · Chest pain that may spread to your arms, jaw, back, or neck, trouble breathing, unusual sweating, faintness · Fast, pounding, or uneven heartbeat · Fever, chills, cough, sore throat, and body aches · Lightheadedness, dizziness, or fainting · Numbness, tingling, or burning pain in your hands, arms, legs, or feet · Unusual bleeding, bruising, or weakness If you notice these less serious side effects, talk with your doctor: · Diarrhea, nausea, vomiting, loss of appetite · Headache · Stuffy nose or watery eyes If you notice other side effects that you think are caused by this medicine, tell your doctor. Call your doctor for medical advice about side effects. You may report side effects to FDA at 6-026-FDA-1838 © 2017 [address] Information is for End User's use only and may not be sold, redistributed or otherwise used for commercial purposes. The above information is an  only. It is not intended as medical advice for individual conditions or treatments. Talk to your doctor, nurse or pharmacist before following any medical regimen to see if it is safe and effective for you. Lidocaine (On the skin) Lidocaine (HJT-bzc-fadx) Relieves pain and numbs the skin. Brand Name(s): [place] Lanius, Astero, Burnamycin, Standard Pacific, [employer], [employer], Glydo, Good Sense Aloe With Lidocaine, Good Sense Burn Relief, LC-4, LC-5, LMX 4, LMX 5, Lido-K There may be other brand names for this medicine. When This Medicine Should Not Be Used: This medicine is not right for everyone. Do not use it if you had an allergic reaction to lidocaine or similar medicines. How to Use This Medicine:  
Cream, Foam, Gel/Jelly, Liquid, Lotion, Ointment, Pad, Spray · Use this medicine as directed. Do not use more medicine or use it more often than your doctor tells you to. · Follow the instructions on the medicine label if you are using this medicine without a prescription. · Unless directed by your doctor, do not apply this medicine to open wounds, burns, broken or inflamed skin, or to a large area of skin. · Do not cover the treated area with a bandage unless directed by your doctor. · Do not eat or drink for at least 1 hour after you use this medicine in your mouth or throat. Do not chew gum or food while your mouth or throat feels numb. · Do not get this medicine in your eyes. If the medicine does get in your eyes, wash your eyes with water right away. · Missed dose: Apply a dose as soon as you can. If it is almost time for your next dose, wait until then and apply a regular dose. Do not apply extra medicine to make up for a missed dose. · Store the medicine in a closed container at room temperature, away from heat, moisture, and direct light. Drugs and Foods to Avoid: Ask your doctor or pharmacist before using any other medicine, including over-the-counter medicines, vitamins, and herbal products. · Tell your doctor if you are using medicine for heart rhythm problems, such as mexiletine or amiodarone. Warnings While Using This Medicine: · Tell your doctor if you are pregnant or breastfeeding, or if you have heart problems, porphyria, epilepsy, kidney disease, or liver disease. · Do not give lidocaine solution to a child younger than 3 years for teething pain, unless your doctor says it is okay. · Call your doctor if your symptoms do not improve or if they get worse. · Keep all medicine out of the reach of children. Never share your medicine with anyone. Possible Side Effects While Using This Medicine:  
Call your doctor right away if you notice any of these side effects: · Allergic reaction: Itching or hives, swelling in your face or hands, swelling or tingling in your mouth or throat, chest tightness, trouble breathing · Blurred or double vision · Confusion, dizziness, drowsiness, or lightheadedness · Slow or uneven heartbeat · Tremor or seizures · Troubled or shallow breathing If you notice other side effects that you think are caused by this medicine, tell your doctor. Call your doctor for medical advice about side effects. You may report side effects to FDA at 5-368-FDA-7973 © 2017 [address] Information is for End User's use only and may not be sold, redistributed or otherwise used for commercial purposes. The above information is an  only. It is not intended as medical advice for individual conditions or treatments. Talk to your doctor, nurse or pharmacist before following any medical regimen to see if it is safe and effective for you.

## 2017-07-22 NOTE — CONSULTS
[facility] - Consult Note    Consultation request by Sonia Mccracken MD for advice/opinion related to evaluating Hypoxia; Chest pain    Date of  Admission: 7/22/2017 11:01 AM   Primary Care Physician:  Koby Rosado MD     Assessment:     Patient Active Problem List   Diagnosis Code    Pulmonary hypertension ([facility] [address] I27.2    Hypertension I10    Angioedema T78. 3XXA    Mitral regurgitation, s/p MV repair # 27 Physio Ring I34.0    Incomplete bundle branch block I45.4    Intraventricular conduction delay I45.9    Dyspnea R06.00    Leg edema R60.0    Ischemic cardiomyopathy, CABG X1 LIMA to the LAD 2009/EF 25% I25.5    Thrombocytopenia ([facility]) D69.6    Two vessel coronary artery disease[414.01] L73.75    Systolic CHF, acute on chronic ([facility]) I50.23    Biventricular ICD (implantable cardiac defibrillator) in place,no LV lead Z95.810    Angina of effort ([facility]) I20.8    Vertigo R42    Palpitation R00.2    Respiratory distress R06.00    CHF (congestive heart failure) ([facility]) I50.9    Mitral regurgitation, s/p MV repair # 30 Physio Ring Z98.890    Chest pain R07.9    Hypoxia R09.02       -Acute hypoxic respiratory failure, now on oxygen  -Increasing chest pain with h/o chronic stable angina treated with nitrates/ranexa, worsening over past 3 months. Discussion 6/6/17 with primary cardiologist for cath by Dr. Radha Michael and after evaluating risk/benefits, was to treat medically. Chronic lateral ST/T wave changes. -h/o chronic systolic heart failure, CHF class III, last admit January 2017  -Ischemic cardiomyopathy with EF 20% by echo January 2017  -CAD, s/p CABG with LIMA LAD 2009. Previous RCA stent 2008 prior to CABG. -s/p mitral valve ring 2009 with residual moderate MR by echo Jan 2017  -Dual chamber Medtronic AICD 2010. Unable to place LV lead due to anatomy. -IVCD by EKG,  msec by EKG this admission  -Chronic anemia  -Chronic stage III kidney disease.   Patient does not want dialysis now and based on previous discussions. Primary cardiologist Dr. Garza Parent:     Patient does not want dialysis and has been told if heart cath occurs, high chance for HD. I confirmed today that she does not want dialysis. Will therefore not pursue cath for worsening chest pain. She does not appear in heart failure. I would continue her current antihypertensive/antianginals and increase hydralazine from 25 TID to 50 TID if her BP tolerates. Will need to address long term goals of care. I will repeat echo to evaluate EF and valves. History of Present Illness: This is a 80 y.o. female admitted for Hypoxia; Chest pain. Patient complains of:    Chest pain this morning and dyspnea. Generalized weakness, LE edema, R>L. She has a long time history of intermittent chest pain. Initially sinus tach. She was going to be discharged but found to have hypoxia, now on oxygen. Last seen Dr. Wing Barajas 6/6/17. She had exertional chest pain at that time without change in frequency after increasing imdur to 120. Cath discussed at that time but deferred due to co-morbidities and anemia. Given trial of Ranexa. Patient and family elected for medical therapy. Cardiac risk factors: Known CAD    Review of Symptoms:  Except as stated above include:  Constitutional:  fatigue  Respiratory:  negative  Cardiovascular:  Chest pain, dyspnea, edema  Gastrointestinal: negative  Genitourinary:  negative  Musculoskeletal:  Negative  Neurological:  Negative  Dermatological:  Negative  Endocrinological: Negative  Psychological:  Negative     Past Medical History:     Past Medical History:   Diagnosis Date    Actinic keratoses     Angioedema 2003    and respiratory failure from reaction to lisinopril    Atypical chest pain     Cardiac cath 02/06/2009    mRCA 100% (overlapping 2.75 x 28 & 2.75 x 12 Vision BMS). LM patent. mLAD 80%. CX 25%. OM1 100%. LVEDP 28.  EF 45%. Inferobasal akn. MR 3+.   Left RA 50%. RA 7.  RV 40/4. PA 37/13. W 18.  CO/CI 5.0/3.2 (TD); 4.0 x 2.6 (Dick).  Cardiac echocardiogram 01/15/2017    Severe LVE. EF 20%. Severe diffuse hypk. Indeterminate diastolic fx. RVSP 30 mmHg. Mild DORIS. Mod MR. Mod PI.  IVCE. Left pleural effusion. Similar to study of 2/9/10.  Cardiac nuclear imaging test, abnormal 09/27/2010    Mod basal inferior, early mid inferior infarction w/o ischemia. Mild mid anterior artifact vs infarction w/o ischemia. Massive LVE. EF 18%. Marked septal, inferior hypk. Inferoapical, inferoseptal dysk. Anterior hypk. RVE suggested.  Cardiovascular ankle brachial index 03/28/2014    No arterial disease at rest bilaterally. R АНДРЕЙ 1.02.  L АНДРЕЙ 1.05.  R DBI 0.48. L DBI 0.64. Exercise deferred.  Cardiovascular RLE venous duplex 01/14/2017    Right leg:  No DVT.  Carotid duplex 03/28/2014    Mild <50% bilateral ICA plaquing.       Coronary artery disease     Most likely myocardial infarction 12-08; stenting of the RCA and LIMA to LAD with bypass surgery later that year    Dyslipidemia     GERD (gastroesophageal reflux disease)     Heart failure 6/09,2/10,11/10    readmission to hospital 6/09, 2/10, 11/10    Hypertension     Hypothyroidism     Incomplete bundle branch block     left    Intraventricular conduction delay     with QRS duration of 120 msec with incomplete left BBB    Irritable bowel syndrome     Ischemic cardiomyopathy     EF now in the 15% to 20% range, last assessed September 2010 by nuclear stress test.    Mitral regurgitation     moderately severe, s/p mitral valve repair in 05/2009    Osteoarthritis of knee     Pacemaker 10/10    dual-chamber ICD placed, with LV lead placement with current RV pacing worsening mitral regurgitation    Pulmonary hypertension (HCC)     moderate    Renal artery stenosis (HCC)     Respiratory failure ([address] 2003    Rheumatoid arthritis ([address]     Systolic CHF, chronic ([address]     class III to IV, with multiple recurrent admissions in the past year.  Thrombocytopenia ([address]          Social History:     Social History     Social History    Marital status:      Spouse name: N/A    Number of children: N/A    Years of education: N/A     Social History Main Topics    Smoking status: Never Smoker    Smokeless tobacco: Never Used    Alcohol use No    Drug use: No    Sexual activity: Not on file     Other Topics Concern    Not on file     Social History Narrative        Family History:     Family History   Problem Relation Age of Onset    Hypertension Sister         Medications: Allergies   Allergen Reactions    Ace Inhibitors Swelling     wheezing    Ciprofloxacin Nausea Only     Dizziness, nausea    Codeine Nausea Only    Cozaar [Losartan] Swelling     Tongue edema and generalized swelling    Darvocet A500 [Propoxyphene N-Acetaminophen] Other (comments)     Dizziness    Gabapentin Swelling     (Neurontin)    Lisinopril Swelling     Throat swells      Magox [Magnesium Oxide] Diarrhea    Norvasc [Amlodipine] Swelling     Tongue edema and generalized swelling    Smz-Tmp Ds [Sulfamethoxazole-Trimethoprim] Swelling     Throat swells        No current facility-administered medications for this encounter.           Physical Exam:     Visit Vitals    /70    Pulse 72    Temp 98.3 °F (36.8 °C)    Resp 24    Ht 5' 3\" (1.6 m)    Wt 56.3 kg (124 lb 1.9 oz)    SpO2 98%    BMI 21.99 kg/m2     BP Readings from Last 3 Encounters:   07/22/17 153/70   06/05/17 142/60   03/28/17 160/72     Pulse Readings from Last 3 Encounters:   07/22/17 72   06/05/17 62   03/28/17 68     Wt Readings from Last 3 Encounters:   07/22/17 56.3 kg (124 lb 1.9 oz)   06/05/17 52.2 kg (115 lb)   03/28/17 53.5 kg (118 lb)       General:  alert, cooperative, no distress, appears stated age  Neck:  nontender  Lungs:  Decreased bases  Heart:  regular rate and rhythm, S1, S2 normal, no murmur, click, rub or gallop, aicd pocket intact  Abdomen:  abdomen is soft without significant tenderness, masses, organomegaly or guarding  Extremities:  extremities normal, atraumatic, no cyanosis or edema  Skin: Warm and dry. no hyperpigmentation, vitiligo, or suspicious lesions  Neuro: alert, oriented x3, affect appropriate, no focal neurological deficits, moves all extremities well, no involuntary movements, reflexes at knee and ankle intact  Psych: non focal     Data Review:     Recent Labs      07/22/17   1119   WBC  4.1*   HGB  9.6*   HCT  29.3*   PLT  118*     Recent Labs      07/22/17   1119   NA  141   K  3.7   CL  105   CO2  29   GLU  98   BUN  27*   CREA  1.47*   CA  9.1   ALB  3.3*   SGOT  26   ALT  29       Results for orders placed or performed during the hospital encounter of 07/22/17   EKG, 12 LEAD, INITIAL   Result Value Ref Range    Ventricular Rate 75 BPM    Atrial Rate 75 BPM    P-R Interval 180 ms    QRS Duration 118 ms    Q-T Interval 424 ms    QTC Calculation (Bezet) 473 ms    Calculated P Axis 85 degrees    Calculated R Axis 114 degrees    Calculated T Axis 127 degrees    Diagnosis       Normal sinus rhythm  Right axis deviation  Pulmonary disease pattern  Incomplete left bundle branch block  ST & T wave abnormality, consider lateral ischemia  Prolonged QT  Abnormal ECG    Confirmed by Kamini Harrison MD, [place] Dolly ([id number]) on 7/22/2017 12:17:05 PM     Results for orders placed or performed in visit on 06/05/17   AMB POC EKG ROUTINE W/ 12 LEADS, INTER & REP    Narrative    EKG: unchanged from previous tracings, nonspecific ST and T waves changes, atrially paced rhythm,LVH/Strain     Results for orders placed or performed in visit on 11/18/14   PACEMAKER CHECK    Impression    A-paced - 76%; V-sensed - 100%; Lead impedances and threshold  WNL;  No events       All Cardiac Markers in the last 24 hours:    Lab Results   Component Value Date/Time    CPK 81 07/22/2017 11:19 AM    CKMB 1.4 07/22/2017 11:19 AM    CKND1 1.7 07/22/2017 11:19 AM    TROIQ 0.03 07/22/2017 11:19 AM       Last Lipid:    Lab Results   Component Value Date/Time    Cholesterol, total 183 12/07/2016 04:31 PM    HDL Cholesterol 67 12/07/2016 04:31 PM    LDL,Direct 66 07/29/2010 12:18 PM    LDL, calculated 106 12/07/2016 04:31 PM    Triglyceride 50 12/07/2016 04:31 PM    CHOL/HDL Ratio 2.7 12/07/2016 04:31 PM       Signed By: Birgit Camacho MD     July 22, 2017

## 2017-07-22 NOTE — ED NOTES
TRANSFER - OUT REPORT:    Verbal report given to [address], RN(name) on Lewie Cowden  being transferred to [place] [id number]([facility]) for routine progression of care       Report consisted of patients Situation, Background, Assessment and   Recommendations(SBAR). Information from the following report(s) SBAR, ED Summary and MAR was reviewed with the receiving nurse. Lines:   Peripheral IV 07/22/17 Left Forearm (Active)   Site Assessment Clean, dry, & intact 7/22/2017  1:47 PM   Phlebitis Assessment 0 7/22/2017  1:47 PM   Infiltration Assessment 0 7/22/2017  1:47 PM   Dressing Status Clean, dry, & intact 7/22/2017  1:47 PM   Dressing Type Transparent 7/22/2017  1:47 PM   Hub Color/Line Status Pink 7/22/2017  1:47 PM        Opportunity for questions and clarification was provided.       Patient transported with:   O2 @ 2 liters  Registered Nurse

## 2017-07-22 NOTE — ED PROVIDER NOTES
HPI Comments: 11:10 AM Giuseppe Bailey is a 80 y.o. female with a hx of HTN, CAD, CHF, cardiac cath, pacemaker and other noted PMH who presents to the ED via EMS c/o CP that began this morning. The pt is also complaining of SOB, generalized weakness, and b/l leg swelling R>L. She reports intermittent CP for a \"long time;\" her cardiologist is Dr. Ravinder Alcantara. Per EMS, the pt was given 2 Nitro and 324 mg of ASA en route; she did go into ST at 120 but spontaneously converted to SR. The pt denies decrease in appetite, urinary sx, and any further sx. PCP:  Trevon Cifuentes MD      The history is provided by the patient. Past Medical History:   Diagnosis Date    Actinic keratoses     Angioedema 2003    and respiratory failure from reaction to lisinopril    Atypical chest pain     Cardiac cath 02/06/2009    mRCA 100% (overlapping 2.75 x 28 & 2.75 x 12 Vision BMS). LM patent. mLAD 80%. CX 25%. OM1 100%. LVEDP 28.  EF 45%. Inferobasal akn. MR 3+. Left RA 50%. RA 7.  RV 40/4. PA 37/13. W 18.  CO/CI 5.0/3.2 (TD); 4.0 x 2.6 (Dick).  Cardiac echocardiogram 01/15/2017    Severe LVE. EF 20%. Severe diffuse hypk. Indeterminate diastolic fx. RVSP 30 mmHg. Mild DORIS. Mod MR. Mod PI.  IVCE. Left pleural effusion. Similar to study of 2/9/10.  Cardiac nuclear imaging test, abnormal 09/27/2010    Mod basal inferior, early mid inferior infarction w/o ischemia. Mild mid anterior artifact vs infarction w/o ischemia. Massive LVE. EF 18%. Marked septal, inferior hypk. Inferoapical, inferoseptal dysk. Anterior hypk. RVE suggested.  Cardiovascular ankle brachial index 03/28/2014    No arterial disease at rest bilaterally. R АНДРЕЙ 1.02.  L АНДРЕЙ 1.05.  R DBI 0.48. L DBI 0.64. Exercise deferred.  Cardiovascular RLE venous duplex 01/14/2017    Right leg:  No DVT.  Carotid duplex 03/28/2014    Mild <50% bilateral ICA plaquing.       Coronary artery disease     Most likely myocardial infarction 12-08; stenting of the RCA and LIMA to LAD with bypass surgery later that year    Dyslipidemia     GERD (gastroesophageal reflux disease)     Heart failure 6/09,2/10,11/10    readmission to hospital 6/09, 2/10, 11/10    Hypertension     Hypothyroidism     Incomplete bundle branch block     left    Intraventricular conduction delay     with QRS duration of 120 msec with incomplete left BBB    Irritable bowel syndrome     Ischemic cardiomyopathy     EF now in the 15% to 20% range, last assessed September 2010 by nuclear stress test.    Mitral regurgitation     moderately severe, s/p mitral valve repair in 05/2009    Osteoarthritis of knee     Pacemaker 10/10    dual-chamber ICD placed, with LV lead placement with current RV pacing worsening mitral regurgitation    Pulmonary hypertension (HCC)     moderate    Renal artery stenosis (HCC)     Respiratory failure ([address] 2003    Rheumatoid arthritis ([address]     Systolic CHF, chronic ([address]     class III to IV, with multiple recurrent admissions in the past year.  Thrombocytopenia ([address]        Past Surgical History:   Procedure Laterality Date    HX BLADDER REPAIR      HX CORONARY STENT PLACEMENT  2/6/09    totally occluded right coronary artery was stented successfully with 2.75 mm x 28 mm Vision stent overlapping with the 12 mm length Vision stent    HX HEART CATHETERIZATION  02/09    with stenting of the total occluded right coronary artery    HX HEART VALVE SURGERY      1. Mitral valve repair with size 30 CE Physio ring.   2. Single LIMA to the LAD    HX HEMORRHOIDECTOMY      HX HYSTERECTOMY      HX PACEMAKER  10/13/10    dual-chamber Medtronic AICD without left ventricular lead         Family History:   Problem Relation Age of Onset    Hypertension Sister        Social History     Social History    Marital status:      Spouse name: N/A    Number of children: N/A    Years of education: N/A     Occupational History    Not on file.     Social History Main Topics    Smoking status: Never Smoker    Smokeless tobacco: Never Used    Alcohol use No    Drug use: No    Sexual activity: Not on file     Other Topics Concern    Not on file     Social History Narrative         ALLERGIES: Ace inhibitors; Ciprofloxacin; Codeine; Cozaar [losartan]; Darvocet a500 [propoxyphene n-acetaminophen]; Gabapentin; Lisinopril; Magox [magnesium oxide]; Norvasc [amlodipine]; and Smz-tmp ds [sulfamethoxazole-trimethoprim]    Review of Systems   Constitutional: Negative for diaphoresis and fever. HENT: Negative for ear pain, rhinorrhea and trouble swallowing. Eyes: Negative for visual disturbance. Respiratory: Positive for shortness of breath. Negative for cough. Cardiovascular: Positive for chest pain and leg swelling. Gastrointestinal: Negative for abdominal pain, blood in stool, diarrhea, nausea and vomiting. Genitourinary: Negative for difficulty urinating, flank pain and hematuria. Musculoskeletal: Negative for back pain and neck pain. Skin: Negative for rash. Neurological: Positive for weakness. Negative for dizziness, numbness and headaches. Hematological: Negative. Psychiatric/Behavioral: Negative. All other systems reviewed and are negative. Vitals:    07/22/17 1115 07/22/17 1130 07/22/17 1229 07/22/17 1230   BP: 158/67 154/64  155/61   Pulse: 70 67 79 82   Resp: 24      Temp:       SpO2: 94% 94% (!) 82% (!) 85%   Weight:       Height:                Physical Exam   Constitutional: She appears well-developed and well-nourished. Non-toxic appearance. She does not have a sickly appearance. She does not appear ill. No distress. HENT:   Head: Normocephalic and atraumatic. Mouth/Throat: Oropharynx is clear and moist. No oropharyngeal exudate. Eyes: Conjunctivae and EOM are normal. Pupils are equal, round, and reactive to light. No scleral icterus. Neck: Trachea normal and normal range of motion. Neck supple.  JVD present. No hepatojugular reflux present. No tracheal deviation present. No thyromegaly present. Cardiovascular: Normal rate, regular rhythm, S1 normal, S2 normal, normal heart sounds, intact distal pulses and normal pulses. Exam reveals no gallop, no S3 and no S4. No murmur heard. Pulses:       Radial pulses are 2+ on the right side, and 2+ on the left side. Dorsalis pedis pulses are 2+ on the right side, and 2+ on the left side. Pulmonary/Chest: Effort normal and breath sounds normal. No accessory muscle usage. No tachypnea. No respiratory distress. She has no decreased breath sounds. She has no wheezes. She has no rhonchi. She has no rales. Abdominal: Soft. Normal appearance and bowel sounds are normal. She exhibits no distension and no mass. There is no hepatosplenomegaly. There is no tenderness. There is no rigidity, no rebound, no guarding, no CVA tenderness, no tenderness at McBurney's point and negative Garcia's sign. Musculoskeletal: Normal range of motion. Strength 4/5 throughout    Lymphadenopathy:        Head (right side): No submental, no submandibular, no preauricular and no occipital adenopathy present. Head (left side): No submental, no submandibular, no preauricular and no occipital adenopathy present. She has no cervical adenopathy. Right: No supraclavicular adenopathy present. Left: No supraclavicular adenopathy present. Neurological: She is alert. She has normal strength and normal reflexes. She is not disoriented. No cranial nerve deficit or sensory deficit. Coordination and gait normal. GCS eye subscore is 4. GCS verbal subscore is 5. GCS motor subscore is 6. Grossly intact    Skin: Skin is warm, dry and intact. No rash noted. She is not diaphoretic. Psychiatric: She has a normal mood and affect.  Her speech is normal and behavior is normal. Judgment and thought content normal. Cognition and memory are normal.   Nursing note and vitals reviewed. MDM  Number of Diagnoses or Management Options  Diagnosis management comments: DIFFERENTIAL DIAGNOSES/ MEDICAL DECISION MAKING:  Chest pain etiologies include acute cardiac events to include possible acute myocardial infarction, acute coronary syndrome, pneumonia, chest wall pain (myofascial/ musculoskeletal etiology), chronic obstructive pulmonary disease (copd), acute asthma exacerbation, congestive heart failure, acute bronchitis, pulmonary embolism, upper respiratory infection, referred abdominal pain, other etiologies, versus combination of the above. ED Course       Procedures     Labs essentially normal with the exception of hemoglobin 9.6, this is chronic. Cardiac enzymes negative. Electrolytes normal. Chest X-Ray showed No acute process. EKG showed NSR with rate of 75 bpm. With no ST elevations or depression and non specific T wave inversions V 5-6.   11:54 AM 7/22/2017     12:33 PM I have reassessed the patient. Patient states that she is feeling short of breath. Patient's O2 stats were 89%, she was placed on 2L and sats elevated to 100%. UA order, will reassess pt in 30 min to 1 hour. 12:42 PM Consult: Discussed care with Dr. Moy Silver, Internist. Standard discussion; including history of patients chief complaint, available diagnostic results, and treatment course. He agrees to admit the pt and sates that he will send his residents down to evaluate the pt. No CTA at this time. Consult:  Discussed care with Resident. Standard discussion; including history of patients chief complaint, available diagnostic results, and treatment course. Here in ED to see patient.   1:24 PM        SCRIBE ATTESTATION STATEMENT  Documented by: Mike Schuster scribing for, and in the presence of, Cheryle Childers DO 07/22/17 12:44 PM     Signed by: Monica Castaneda, 07/22/17 11:21 AM     PROVIDER ATTESTATION STATEMENT  I personally performed the services described in the documentation, reviewed the documentation, as recorded by the scribe in my presence, and it accurately and completely records my words and actions.   [address], DO

## 2017-07-23 NOTE — PROGRESS NOTES
[facility] - Progress Note  Admit Date: 7/22/2017    Assessment:     -Acute hypoxic respiratory failure, now on oxygen, qualifies for home O2  -Increasing chest pain with h/o chronic stable angina treated with nitrates/ranexa, worsening over past 3 months. Discussion 6/6/17 with primary cardiologist for cath by Dr. Min Perez and after evaluating risk/benefits, was to treat medically. Chronic lateral ST/T wave changes. -h/o chronic systolic heart failure, CHF class III, last admit January 2017  -Ischemic cardiomyopathy with EF 20% by echo January 2017  -CAD, s/p CABG with LIMA LAD 2009. Previous RCA stent 2008 prior to CABG. -s/p mitral valve ring 2009 with residual moderate MR by echo Jan 2017  -Dual chamber Med[employer]onic AICD 2010. Unable to place LV lead due to anatomy. -IVCD by EKG,  msec by EKG this admission  -Chronic anemia  -Chronic stage III kidney disease. Patient does not want dialysis now and based on previous discussions. -DNR status     Primary cardiologist Dr. Dalia Cross:     Still with pain, given morphine. Very limited options since patient does not want cath due to concerns about worsening renal failure/HD. I will increase hydralazine today given SBP still 140 mmHg. Discussed with Dr. Shekhar Gaming. Subjective:     Still with intermittent chest pain but breathing improved.     Objective:      Patient Vitals for the past 8 hrs:   Temp Pulse Resp BP SpO2   07/23/17 0733 97.6 °F (36.4 °C) 66 18 140/67 90 %   07/23/17 0400 97.8 °F (36.6 °C) (!) 59 18 157/79 100 %         Patient Vitals for the past 96 hrs:   Weight   07/23/17 0705 57.2 kg (126 lb 1.6 oz)   07/22/17 1110 56.3 kg (124 lb 1.9 oz)                    Intake/Output Summary (Last 24 hours) at 07/23/17 0913  Last data filed at 07/23/17 0242   Gross per 24 hour   Intake                0 ml   Output                0 ml   Net                0 ml       Physical Exam:  General:  alert, cooperative, no distress, cachectic  Neck:  nontender  Lungs:  decreased  Heart:  regular rate and rhythm, S1, S2 normal, no murmur, click, rub or gallop  Abdomen:  abdomen is soft without significant tenderness, masses, organomegaly or guarding  Extremities:  extremities normal, atraumatic, no cyanosis or edema    Data Review:     Labs: Results:       Chemistry Recent Labs      07/23/17 0135 07/22/17   1119   GLU  113*  98   NA  141  141   K  4.7  3.7   CL  104  105   CO2  31  29   BUN  31*  27*   CREA  1.74*  1.47*   CA  9.5  9.1   AGAP  6  7   BUCR  18  18   AP   --   24*   TP   --   6.4   ALB   --   3.3*   GLOB   --   3.1   AGRAT   --   1.1      CBC w/Diff Recent Labs      07/23/17 0135 07/22/17   1119   WBC  4.1*  4.1*   RBC  3.23*  3.13*   HGB  9.9*  9.6*   HCT  31.1*  29.3*   PLT  144  118*   GRANS   --   60   LYMPH   --   24   EOS   --   2      Cardiac Enzymes Lab Results   Component Value Date/Time    CPK 81 07/22/2017 11:19 AM    CKMB 1.4 07/22/2017 11:19 AM    CKND1 1.7 07/22/2017 11:19 AM    TROIQ 0.03 07/22/2017 11:19 AM      Coagulation No results for input(s): PTP, INR, APTT in the last 72 hours.     No lab exists for component: INREXT    Lipid Panel Lab Results   Component Value Date/Time    Cholesterol, total 183 12/07/2016 04:31 PM    HDL Cholesterol 67 12/07/2016 04:31 PM    LDL,Direct 66 07/29/2010 12:18 PM    LDL, calculated 106 12/07/2016 04:31 PM    VLDL, calculated 10 12/07/2016 04:31 PM    Triglyceride 50 12/07/2016 04:31 PM    CHOL/HDL Ratio 2.7 12/07/2016 04:31 PM      BNP No results found for: BNP, BNPP, XBNPT   Liver Enzymes Recent Labs      07/22/17   1119   TP  6.4   ALB  3.3*   AP  24*   SGOT  26      Digoxin    Thyroid Studies Lab Results   Component Value Date/Time    TSH 2.53 01/13/2017 11:35 AM          Signed By: Lakeshia Wu MD     July 23, 2017

## 2017-07-23 NOTE — DISCHARGE SUMMARY
Discharge Summary  [facility]      Patient: Licha Paulino Age: 80 y.o. Sex: female  : 1928    MRN: [id number]      DOA: 2017      Discharge Date: 17      Jesús Barrera MD      PCP: Lenin Mercado MD        ================================================================    Reason for Admission:   Hypoxia  Chest pain  Hypoxia  Chest pain    Discharge Diagnoses:   Chest pain with hypoxia (stable)  CHF (stable)  CKD IV (stable)  Anemia (stable)  HTN (stable)  HLD (stable)  CAD (stable)  Hypothyroidism (stable)  GERD (stable)    Important notes to PCP/ follow-up studies and evaluations   None  Pending labs and studies:  None  Operative Procedures:   None    Discharge Medications:     Current Discharge Medication List      START taking these medications    Details   lidocaine (ASPERCREME, LIDOCAINE,) 4 % topical cream Apply  to affected area two (2) times daily as needed for Pain. Apply on skin around the pacemaker. Qty: 1 Tube, Refills: 1         CONTINUE these medications which have CHANGED    Details   hydrALAZINE (APRESOLINE) 25 mg tablet Take 3 Tabs by mouth three (3) times daily for 30 days. Qty: 270 Tab, Refills: 1         CONTINUE these medications which have NOT CHANGED    Details   isosorbide mononitrate ER (IMDUR) 120 mg CR tablet Take 1 Tab by mouth daily. Qty: 30 Tab, Refills: 6    Associated Diagnoses: Angina of effort ([address]      Ca-D3-mag ox-zinc--romina-bor (CALCIUM 600-D3 PLUS) 600 mg calcium- 800 unit-50 mg tab Take 1 Each by mouth daily. b complex vitamins (B COMPLEX 1) tablet Take 1 Tab by mouth daily. atorvastatin (LIPITOR) 20 mg tablet Take 1 Tab by mouth nightly. Qty: 30 Tab, Refills: 0      carvedilol (COREG) 25 mg tablet Take 1 Tab by mouth two (2) times daily (with meals). Qty: 60 Tab, Refills: 6      fluticasone (FLONASE) 50 mcg/actuation nasal spray 2 Sprays by Both Nostrils route nightly.       omeprazole (PRILOSEC) 20 mg capsule Take 20 mg by mouth daily. ferrous sulfate (IRON) 325 mg (65 mg iron) tablet Take 650 mg by mouth daily. flaxseed oil 1,000 mg cap Take  by mouth two (2) times a day. LACTOBACILLUS ACIDOPHILUS (PROBIOTIC PO) Take  by mouth two (2) times a day. 2 tabs bid      BUMETANIDE (BUMEX PO) Take 1 mg by mouth as directed. Take one tablet daily and as directed if weight up 2+ pounds     Associated Diagnoses: Systolic CHF, chronic (HCC)      potassium chloride (K-DUR) 20 mEq tablet Take 20 mEq by mouth two (2) times a day. CRANBERRY EXT/C/L. SPOROGENES (CRANBERRY-PROBIOTICS-VITAMIN C PO) Take 1,500 mg by mouth two (2) times a day. 3 tablets      aspirin 81 mg tablet Take 81 mg by mouth daily. levothyroxine (LEVOXYL) 50 mcg tablet Take 50 mcg by mouth daily (before breakfast). nitroglycerin (NITROSTAT) 0.4 mg SL tablet 0.4 mg by SubLINGual route every five (5) minutes as needed. for chest pain             Disposition: Home with Home Health    Consultants:    Dr. Aziza Patel, Cardiology    [address] Course (including pertinent history and physical findings)  The patient was admitted for chest pain associated with SOB with burning sensation that has been present for the past week. CXR in the ED showed no acute process and the EKG on admit showed HR of 75 without STEMI but had nonspecific T wave inversions in lateral leads. The patient also had troponin of 0.03 with repeat troponin at 0.03. The patient also became hypoxic in the mid 80s, requiring supplemental O2 via nasal cannula, which her SpO2 improved. The patient was admitted on telemetry and cardiology was consulted. The patient reported burning, substernal chest pain that radiated to her back overnight. Morphine only provided minimal improvement, but morphine improed her breathing. She was also given GI cocktail as she has history of GERD, but provided slight relief and patient does not feel this was similar to her GERD symptoms. The patient had an TTE while in the hospital, which showed worsening dilation of L. Atrium with 15-20% LVEF, which was not too far from her base LVEF of 20% in January. Due to patient's poor renal function, the patient needed to go through dialysis prior to cardiac cath, but the patient did not think she was strong enough to go through with the dialysis. Per cardiology, the patient has chronic lateral ST/T wave changes. Per cardiology, the patient's hydralazine was increased to manage her blood pressure more tightly   During the exam on the day of her discharge, the patient endorsed worsening of her chest pain on palpation on the L midclavicular chest wall. She also said the pain was worse in the morning but got better throughout the day. The patient was prescribed topical cream to treat likely underlying musculoskeletal condition. Due to her low LVEF compromising her respiration, the patient was prescribed home supplemental home O2. While the patient was sitting down on room air, her O2 saturation was measured to be 87%. With the supplemental O2, the patient's SpO2 loi above 90%. Duplex doppler was done on her lower extremities which did not show evidence of DVT. On the day of discharge, the patient was hemodynamically stable and back to her baseline. The patient was seen stable and safe for discharge on home health with portable oxygen and to follow up with cardiology within few days. Summarized key findings and results (labs, imaging studies, ECHO, cardiac cath, endoscopies, etc):  TTE 7/22/17: 15-20% LVEF. R. Ventricle peak pressure 60-65mmHg. Taylor Dun severely dilated. Regurgitation on mitral and pulmonic valve. L atrium has increase in size from prior study  EKG 7/22/17: Normal sinus rhythm. Right axis deviation. Pulmonary disease pattern. Incomplete left bundle branch block. ST & T wave abnormality, consider lateral ischemia. Prolonged QT   CXR 7/22/17: 1.  Stable moderate enlargement of the cardiac silhouette. 2. Persistent prominence of the bronchovascular markings, possibly chronic interstitial lung disease or chronic bronchitis. Recurrent pulmonary vascular congestion not excluded. 3. Persistent bibasilar hazy opacities and blunted costophrenic sulci, compatible with small effusions with overlying atelectasis. Superimposed infiltrate/edema not excluded  Labs: Troponin 0.03 x2; Cr 1.47, CKMB 1.4, hgb 9.6  PVL 7/23/17: No evidence of DVT on b/l lower extremities    Functional status and cognitive function:    Ambulates with assistance  Status: alert, cooperative, no distress, appears stated age    Diet:  Cardiac diet. Code status and advanced care plan: DNR  Point of Contact: Quinn Soriano  [phone]    Patient Education:  Patient was educated on the following topics prior to discharge:None    Follow-up:   Follow-up Information     Follow up With Details Comments Contact Info    Armaan Pettit MD Go in 2 days Cardiology ED visit follow up [address] [facility] [place] [address] EMERGENCY DEPT Go to As needed, If symptoms worsen [address] [place] [address], MD   [address] [place] [id number]              ================================================================  [employer] [place] [employer] LORNA Merritt  PGY-1 [address] [facility]  07/23/17 3:31 PM

## 2017-07-23 NOTE — PROGRESS NOTES
Intern Progress Note  [facility]       Patient: Vivienne Garces MRN: [id number]  CSN: [id number]    YOB: 1928  Age: 80 y.o. Sex: female    DOA: 7/22/2017 LOS:  LOS: 1 day                    Subjective:     Acute events: Ms. Boyd Crespo continues to have burning, substernal chest pain that radiates to her back. Morphine given last night provided only minimal improvement, but she reports that her breathing has improved. Stated that she doesn't want dialysis in order to do a cardiac cath, because she doesn't think she's strong enough. Objective:      Patient Vitals for the past 24 hrs:   Temp Pulse Resp BP SpO2   07/23/17 0400 97.8 °F (36.6 °C) (!) 59 18 157/79 100 %   07/23/17 0000 97.7 °F (36.5 °C) 62 18 130/72 100 %   07/22/17 2154 - 67 - 133/66 -   07/22/17 1951 97.7 °F (36.5 °C) 71 18 155/64 98 %   07/22/17 1631 97.5 °F (36.4 °C) 73 16 156/71 98 %   07/22/17 1330 - 72 - 153/70 98 %   07/22/17 1315 - 71 - 161/74 99 %   07/22/17 1300 - 76 - 152/85 (!) 89 %   07/22/17 1245 - 70 - 129/65 100 %   07/22/17 1230 - 82 - 155/61 (!) 85 %   07/22/17 1229 - 79 - - (!) 82 %   07/22/17 1130 - 67 - 154/64 94 %   07/22/17 1115 - 70 24 158/67 94 %   07/22/17 1110 98.3 °F (36.8 °C) 77 18 161/61 95 %   07/22/17 1108 - 75 14 161/61 94 %       Physical Exam:   Constitutional: She appears well-developed. No distress. HENT:   Head: Normocephalic. Eyes: Right eye exhibits no discharge. Left eye exhibits no discharge. Cardiovascular: Normal rate and regular rhythm. Exam reveals no gallop and no friction rub. No murmur heard. Pulmonary/Chest: Effort normal. No respiratory distress. CTAB. She has no wheezes. She exhibits no tenderness. Abdominal: Soft. Bowel sounds are normal. She exhibits no distension and no mass. There is no tenderness. There is no rebound and no guarding. No hernia. Musculoskeletal: She exhibits no edema, tenderness or deformity. Neurological: She is alert.  She has normal reflexes. No cranial nerve deficit. She exhibits normal muscle tone. Skin: Skin is warm and dry. No rash noted. She is not diaphoretic. No erythema. Psychiatric: She has a normal mood and affect.  Thought content normal.     Lab/Data Reviewed:  CMP:   Lab Results   Component Value Date/Time     07/23/2017 01:35 AM    K 4.7 07/23/2017 01:35 AM     07/23/2017 01:35 AM    CO2 31 07/23/2017 01:35 AM    AGAP 6 07/23/2017 01:35 AM     (H) 07/23/2017 01:35 AM    BUN 31 (H) 07/23/2017 01:35 AM    CREA 1.74 (H) 07/23/2017 01:35 AM    GFRAA 33 (L) 07/23/2017 01:35 AM    GFRNA 28 (L) 07/23/2017 01:35 AM    CA 9.5 07/23/2017 01:35 AM    ALB 3.3 (L) 07/22/2017 11:19 AM    TP 6.4 07/22/2017 11:19 AM    GLOB 3.1 07/22/2017 11:19 AM    AGRAT 1.1 07/22/2017 11:19 AM    SGOT 26 07/22/2017 11:19 AM    ALT 29 07/22/2017 11:19 AM        Scheduled Medications Reviewed:  Current Facility-Administered Medications   Medication Dose Route Frequency    aspirin chewable tablet 81 mg  81 mg Oral DAILY    atorvastatin (LIPITOR) tablet 20 mg  20 mg Oral QHS    bumetanide (BUMEX) tablet 1 mg  1 mg Oral DAILY    calcium-vitamin D 600 mg(1,500mg) -200 unit per tablet 1 Tab  1 Tab Oral DAILY    carvedilol (COREG) tablet 25 mg  25 mg Oral BID WITH MEALS    ferrous sulfate tablet 325 mg  325 mg Oral DAILY WITH BREAKFAST    fluticasone (FLONASE) 50 mcg/actuation nasal spray 2 Spray  2 Spray Both Nostrils QHS    isosorbide mononitrate ER (IMDUR) tablet 120 mg  120 mg Oral DAILY    Lactobacillus Acidoph & Bulgar (FLORANEX) tablet 2 Tab  2 Tab Oral BID    levothyroxine (SYNTHROID) tablet 50 mcg  50 mcg Oral ACB    pantoprazole (PROTONIX) tablet 40 mg  40 mg Oral DAILY    potassium chloride (K-DUR, KLOR-CON) SR tablet 20 mEq  20 mEq Oral BID    heparin (porcine) injection 5,000 Units  5,000 Units SubCUTAneous Q8H    hydrALAZINE (APRESOLINE) tablet 50 mg  50 mg Oral TID         Imaging, microbiology, and EKG/Telemetry:  none    Assessment/Plan   Bob Schuler is a 80 y.o. female with PMH significant for CAD with MI, mitral valve repair, ICD placement, systolic CHF, rheumatoid arthritis and HTN now presenting with complaint of chest pain.      Chest pain with Hypoxia. Now stable. Mostly likely cardiac disease vs PE. EKG on admission not indicative of acute disease, CXR not indicative of acute process (persistent bronchovascular markings and bibasilar hazy opacities). Troponin 0.03. Patient sees Dr. Debbi Gold (cardiologist) outpatient. - Patient has a positive homans sign. PVL to rule out DVT. Wells criteria for DVT 1 (recently much less mobile), which confers a moderate likelihood for DVT  - 2nd set of cardiac enzymes pending  - Evaluation for home O2; patient has resting oxygen saturation of 87% on room air, which increased to above 90% on 2L O2.   - Cardiology following: No cardiac cath planned; Echo to rule out acute changes  - Tele monitoring  - Nitrostat PRN  - will give GI cocktail to help provide relief     CHF Last echo 01/2017 with EF 20% and severe diffuse hypokinesis. - Fluid restricted diet  - Imdur  - continue home bumex  - I/Os     CAD PMH with MI, mitral valve repair, stent placement and AICD placement. - Continue home atorvastatin   - Continue home aspirin  - Continue home carvidilol  - Prolonged Qtc. Cautious use of anti-emetics     CKD IV with stable anemia Cr 1.47 BUN 27 on admission, at or below her baseline. Hemoglobin 9.6 (at baseline) Patient follows with Dr. Gurdeep Gentile as an outpatient.  - Cr. 1.74 today, HgB 9.9   - Continue home Ferrous sulfate   - Hemoccult negative  - Daily CBC, BMP     HTN and Hyperlipidemia  - Hydralazine 50 mg TID (increased from 25 per cardiology recommendations)     Hypothyroidism TSH 6/27/2017 WNL  - Continue home synthroid     GERD patient notes she was diagnosed with a hiatal hernia and frequent eructations.  Does not see GI.  - Protonix 40 mg QD  - ENT follow up as an outpatient        Diet: Cardiac with fluid restriction  DVT Prophylaxis: Heparin  Code Status: DNR     Disposition and anticipated LOS: <2 midnights.       Kaylynn Barr MD   7/23/2017, 7:03 AM    Patria FERNANDEZ   PGY-3 [facility]  07/23/17 12:10 PM

## 2017-07-23 NOTE — ROUTINE PROCESS
Bedside shift change report given to Lupillo Lai RN (oncoming nurse) by Jackeline Nava RN (offgoing nurse). Report included the following information SBAR, ED Summary, MAR and Recent Results.

## 2017-07-23 NOTE — PROGRESS NOTES
Patient to be discharged home today. Patient is in need of home O2. Patient's Resting RA sats at 87%. SW contacted [employer] answering service with referral.     [id number]: Spoke with [employer], Dixie Carr. Dixie Carr stated O2 will be delivered in the next few hours. PFM stated the patient can be discharged home once O2 has been delivered to the patient.

## 2017-07-23 NOTE — PROGRESS NOTES
Care Management Interventions  PCP Verified by CM: Yes (Dr. Maurizio Hyatt, [facility])  Mode of Transport at Discharge: Other (see comment) (Patient's family member)  Transition of Care Consult (CM Consult): Discharge Planning (Home O2)  Discharge Durable Medical Equipment: Yes (Home O2)  Current Support Network: Lives with Spouse, Family Lives Nearby (Pt lives with her . She has family nearby who assist with bigger tasks around the house.)  Confirm Follow Up Transport: Family (Pt's spouse drives, family drives if necessary)  Plan discussed with Pt/Family/Caregiver: Yes  Discharge Location  Discharge Placement: Home with family assistance    Patient is an 81 yo female who was admitted for hypoxia and chest pain. The patient is alert and oriented. Her granddaughter, Melissa Velázquez, is at bedside. Naima Zamora is a RN and serves as the patient's medical advocate and assists with coordinating her outpatient care. The patient is currently waiting for her O2 delivery and will be discharged home following delivery and a veinous study. The patient lives with her  who is 81 yo. The patient has remained relatively independent around the home, but does have the assistance of family members for the more strenuous tasks. The patient continues to dress and bathe herself most days, but family will assist if needed. She cooks, but family tries to assist as much as possible. They are also considering hiring someone private pay to assist with light duty work. REINALDO provided information on [employer], [employer], and [facility]. Additionally, REINALDO gave the telephone number for [facility]. REINALDO was given permission to send over information for completion of referral to [address] [place] [address].  MAURILIO with .

## 2017-07-23 NOTE — PROGRESS NOTES
Brief discussion with Ms Jeramie Abdul and her Granddaughter, with FBAI Felton also at bedside to answer some questions and concerns from granddaughter regarding Ms [facility] hospital stay. No new complaints, concerns or questions raised at end of discussion. Encouraged Ms Jeramie Abdul and family to reach out should they have any questions or concerns.      Jamal FERNANDEZ   PGY-3 120 [facility]  07/23/17 4:29 PM

## 2017-07-24 NOTE — ROUTINE PROCESS
2115: Armband and IV removed. Pt in room with no complaints. Discharge instructions reviewed and pt had no additional questions.

## 2017-07-24 NOTE — PROCEDURES
[facility] ***    Name: Sai Man  MRN: [id number]    Inpatient  : 1928  HIS Order #: [id number]  [address] [place] Visit #: [id number]  Date: 2017    TYPE OF TEST: Peripheral Venous Testing    REASON FOR TEST  Pain in limb, Limb swelling    Right Leg:-  Deep venous thrombosis:           No  Superficial venous thrombosis:    No  Deep venous insufficiency:        Not examined  Superficial venous insufficiency: Not examined    Left Leg:-  Deep venous thrombosis:           No  Superficial venous thrombosis:    No  Deep venous insufficiency:        Not examined  Superficial venous insufficiency: Not examined      INTERPRETATION/FINDINGS  Duplex images were obtained using 2-D gray scale, color flow, and  spectral Doppler analysis. Right leg :  1. Deep veins visualized include the common femoral, femoral,  popliteal, posterior tibial and peroneal veins. 2. No evidence of deep venous thrombosis detected in the veins  visualized. 3. Superficial veins visualized include the great saphenous vein. 4. No evidence of superficial thrombosis detected. 5. Normal multiphasic flow in the posterior tibial artery. Left leg :  1. Deep veins visualized include the common femoral, femoral,  popliteal, posterior tibial and peroneal veins. 2. No evidence of deep venous thrombosis detected in the veins  visualized. 3. Superficial veins visualized include the great saphenous vein. 4. No evidence of superficial thrombosis detected. 5. Normal multiphasic flow in the posterior tibial artery. ADDITIONAL COMMENTS    I have personally reviewed the data relevant to the interpretation of  this  study. TECHNOLOGIST: PARMJIT Bose, RITU  Signed: 2017 08:26 PM    PHYSICIAN: Sean Chavarria.  Samia Shay MD  Signed: 2017 09:05 AM

## 2017-07-24 NOTE — DISCHARGE INSTRUCTIONS
Patient armband removed and shredded. Discharge medications reviewed with the patient and appropriate educational materials and side effects teaching were provided. DISCHARGE SUMMARY from Nurse    The following personal items are in your possession at time of discharge:    Dental Appliances: Uppers, Lowers  Visual Aid: Glasses, With patient     Home Medications: None  Jewelry: Earrings  Clothing: At bedside, Pants, Shirt  Other Valuables: None             PATIENT INSTRUCTIONS:    After general anesthesia or intravenous sedation, for 24 hours or while taking prescription Narcotics:  · Limit your activities  · Do not drive and operate hazardous machinery  · Do not make important personal or business decisions  · Do  not drink alcoholic beverages  · If you have not urinated within 8 hours after discharge, please contact your surgeon on call. Report the following to your surgeon:  · Excessive pain, swelling, redness or odor of or around the surgical area  · Temperature over 100.5  · Nausea and vomiting lasting longer than 4 hours or if unable to take medications  · Any signs of decreased circulation or nerve impairment to extremity: change in color, persistent  numbness, tingling, coldness or increase pain  · Any questions        What to do at Home:  Recommended activity: Activity as tolerated,     If you experience any of the following symptoms chest pain, shortness of breath, or any concerning symptoms please follow up with Primary Care Physician, or emergency department. *  Please give a list of your current medications to your Primary Care Provider. *  Please update this list whenever your medications are discontinued, doses are      changed, or new medications (including over-the-counter products) are added. *  Please carry medication information at all times in case of emergency situations.           These are general instructions for a healthy lifestyle:    No smoking/ No tobacco products/ Avoid exposure to second hand smoke    Surgeon General's Warning:  Quitting smoking now greatly reduces serious risk to your health. Obesity, smoking, and sedentary lifestyle greatly increases your risk for illness    A healthy diet, regular physical exercise & weight monitoring are important for maintaining a healthy lifestyle    You may be retaining fluid if you have a history of heart failure or if you experience any of the following symptoms:  Weight gain of 3 pounds or more overnight or 5 pounds in a week, increased swelling in our hands or feet or shortness of breath while lying flat in bed. Please call your doctor as soon as you notice any of these symptoms; do not wait until your next office visit. Recognize signs and symptoms of STROKE:    F-face looks uneven    A-arms unable to move or move unevenly    S-speech slurred or non-existent    T-time-call 911 as soon as signs and symptoms begin-DO NOT go       Back to bed or wait to see if you get better-TIME IS BRAIN. Warning Signs of HEART ATTACK     Call 911 if you have these symptoms:   Chest discomfort. Most heart attacks involve discomfort in the center of the chest that lasts more than a few minutes, or that goes away and comes back. It can feel like uncomfortable pressure, squeezing, fullness, or pain.  Discomfort in other areas of the upper body. Symptoms can include pain or discomfort in one or both arms, the back, neck, jaw, or stomach.  Shortness of breath with or without chest discomfort.  Other signs may include breaking out in a cold sweat, nausea, or lightheadedness. Don't wait more than five minutes to call 911 - MINUTES MATTER! Fast action can save your life. Calling 911 is almost always the fastest way to get lifesaving treatment. Emergency Medical Services staff can begin treatment when they arrive -- up to an hour sooner than if someone gets to the hospital by car.        The discharge information has been reviewed with the patient. The patient verbalized understanding. Discharge medications reviewed with the patient and appropriate educational materials and side effects teaching were provided. MyChart Activation    Thank you for requesting access to [facility]. Please follow the instructions below to securely access and download your online medical record. [facility] allows you to send messages to your doctor, view your test results, renew your prescriptions, schedule appointments, and more. How Do I Sign Up? 1. In your internet browser, go to [email or web address]  2. Click on the First Time User? Click Here link in the Sign In box. You will be redirect to the New Member Sign Up page. 3. Enter your [facility] Access Code exactly as it appears below. You will not need to use this code after youve completed the sign-up process. If you do not sign up before the expiration date, you must request a new code. [facility] Access Code: [email or web address]  Expires: 10/21/2017  8:12 PM (This is the date your [facility] access code will )    4. Enter the last four digits of your Social Security Number (xxxx) and Date of Birth (mm/dd/yyyy) as indicated and click Submit. You will be taken to the next sign-up page. 5. Create a [facility] ID. This will be your [facility] login ID and cannot be changed, so think of one that is secure and easy to remember. 6. Create a [facility] password. You can change your password at any time. 7. Enter your Password Reset Question and Answer. This can be used at a later time if you forget your password. 8. Enter your e-mail address. You will receive e-mail notification when new information is available in [address]. 9. Click Sign Up. You can now view and download portions of your medical record. 10. Click the Download Summary menu link to download a portable copy of your medical information.     Additional Information    If you have questions, please visit the Frequently Asked Questions section of the [employer] website at [email or web address] Remember, [employer] is NOT to be used for urgent needs. For medical emergencies, dial 911.

## 2017-07-31 NOTE — PATIENT INSTRUCTIONS
Begin Entresto 24mg 2x daily  Stop hydrozoline  If you have any further questions or concerns, please contact our office. [phone]

## 2017-07-31 NOTE — PROGRESS NOTES
1. Have you been to the ER, urgent care clinic since your last visit? Hospitalized since your last visit? 7/22/17 - 7/23/17 hypoxia, chest pain  2. Have you seen or consulted any other health care providers outside of the [address] [place] [address] since your last visit? Include any pap smears or colon screening.  no

## 2017-07-31 NOTE — PROGRESS NOTES
HISTORY OF PRESENT ILLNESS  Christine Burleson is a 80 y.o. female. HPI  She continues with dyspnea with the slightest amount of activity. She has persistent leg edema. She continues with exertional chest tightness and pressure along with shortness of breath. She has had some palpitations as a fluttering, but no prolonged palpitations. The interrogation of her ICD demonstrated spiking OptiVol  indicating high volume load. She was admitted to the hospital on 07/22/2017 with severe shortness of breath and hypoxemia. She has been placed on home oxygen since then. It was decided not to proceed with the invasive workup because of her advanced age and renal dysfunction. The patient refused to have a cardiac catheterization because of her fear of ending up with dialysis. She had a repeat echocardiogram on 07/23/2017, which demonstrated severe LV dysfunction with EF in the range of 15-20% with grade II diastolic dysfunction parameters. PA pressure was estimated in the range of 60-65 mmHg. The left atrium was severely dilated. There was moderate to severe mitral regurgitation. There was a small pericardial effusion circumferentially in the anterior and posterior wall. Around Parish time in 2008, she had crushing substernal pressure-type pain, which lasted for half an hour or so, and subsequent myocardial perfusion imaging demonstrated moderate sized scarring in the mid to basal inferior wall and diminished LV function with EF in the 42% range. It was felt that she sustained myocardial infarction in December 2008. She subsequently underwent cardiac catheterization on February 6, 2009, which demonstrated:    1. Total occlusion of the dominant RCA in the mid segment with some collaterals to the distal vessel. 2. Patent left main trunk with ostial 30% stenosis. 3. Large wraparound LAD with 75-80% stenosis in the mid segment.   4. 20-30% stenosis of the circumflex coronary artery throughout with total occlusion of the first obtuse marginal branch at its ostium with no significant distal collaterals.    5. Inferobasal akinesis with overall EF in the 45% range and LVEDP up to 28 mmHg.    6. Moderate to severe mitral regurgitation. 7. 60% stenosis of the right renal artery. 8. Mild to moderate pulmonary hypertension with PA pressure of 37/13 mmHg. Subsequently, the totally occluded right coronary artery was stented successfully with 2.75 mm x 28 mm Vision stent overlapping with the 12 mm length Vision stent. The plan was to see if the mitral regurgitation would improve by stenting the right coronary artery. She had repeat echocardiogram thereafter on March 4, 2009, which demonstrated EF in the 40-45% range. There was moderately severe mitral regurgitation with a regurgitant volume of 48 cc and moderate pulmonary hypertension with PA pressure estimated at 48 mmHg. Left ventricular end-diastolic dimension was 6.3 cm and end-systolic dimension was 4.9 cm, and left atrial dimension was 4.3 cm. It was felt that she could be a potential candidate for coronary artery bypass grafting and mitral valve repair as a first option and stenting of the LAD, along with medical treatment for mitral regurgitation as a second option.      She subsequently underwent open-heart surgery, which consisted of:  1. Mitral valve repair with size 30 CE Physio ring.    2. Single LIMA to the LAD.       She was readmitted to [facility] on June 26, 2009 with chest pain and heart failure at which time repeat echocardiogram demonstrated further deterioration of her LV function with EF in the 20-25% range and mild to moderate residual mitral regurgitation and PA pressure of 60 mmHg. She was diuresed and continued on Coreg with improvement. She has been readmitted to the hospital three times with recurrent congestive heart failure but one in February 2010 was following the hernia operation.  We have considered biventricular ICD but she did not qualify because of the relatively narrow QRS complex at only 110 msec. She underwent stress nuclear cardiac imaging on September 27, 2010, prior to ICD implantation, which demonstrated no significant ischemia but severe LV dysfunction, with EF in the 18% range. She ultimately underwent ICD implantation on October 13, 2010, but unfortunately attempted placement of a left ventricular lead was without success because of inadequate anatomy. She was left with dual-chamber Medtronic AICD without left ventricular lead.    We considered the surgical replacement of the left ventricular lead so that she would have a biventricular ICD. However, the QRS duration is only at most 110 ms, which may put her out of the classic indication. If she has further recurrence of decompensated congestive heart failure, we would certainly reconsider placement of the left ventricular leads surgically. She was admitted to the hospital in January of 2017 with sudden onset of shortness of breath, orthopnea and PND. She was found to be in decompensated congestive heart failure and was treated accordingly with improvement. There was minimal troponin I elevation, which was flat. She had a repeat echocardiogram during the hospitalization, which once again demonstrated severe LV dysfunction with EF in the 20% range. PA pressure was estimated at around 30 mmHg. The left atrium was severely dilated with a volume index of 53 mL/meter2. There was no significant aortic stenosis. There was moderate mitral regurgitation. There was only a trivial pericardial effusion. She was found to be anemic with H & H at 9.2 and 28.3, respectively, with the indices not indicative of iron deficiency anemia. Her BUN/creatinine were 29 and 1.71 on 01/23/2017. Her NT-proBNP level was 175,000. When she was seen in my office on 3/28/17 my recommendation was as follows:        She continues with frequent exertional chest pain daily.  Currently, she shows no physical findings of decompensated congestive heart failure. We discussed the aggressive treatment with invasive diagnostic workup, such as cardiac catheterization, and continued medical treatment. Three members of her family asked multiple questions and we discussed the concerns about her anemia, renal dysfunction and advanced age. They were advised that she needs to be evaluated and treated for anemia first before proceeding with the invasive workup and treatment. They were also advised that there is a great deal of risk of renal failure with the dye load particularly with her coexisting renal dysfunction and advanced age. I recommended a trial of Ranexa and there is a great deal of concern over the high cost of the medication. I recommended that we try her on the Ranexa samples first before making a final decision on continued treatment. We will apply for a Ranexa sample. For the time being, I would try a higher dose of Imdur at 120 mg per day from 30 mg. After a long discussion, the patient and her family members agreed to go on with continued medical treatment first before any consideration of invasive workup and treatment. Review of Systems   Constitutional: Negative for malaise/fatigue and weight loss. HENT: Negative for hearing loss. Eyes: Negative for blurred vision and double vision. Respiratory: Positive for shortness of breath. Cardiovascular: Positive for chest pain, palpitations and leg swelling. Negative for orthopnea, claudication and PND. Gastrointestinal: Negative for blood in stool, heartburn and melena. Genitourinary: Positive for frequency. Negative for dysuria, hematuria and urgency. Musculoskeletal: Negative for back pain and joint pain. Skin: Negative for itching and rash. Neurological: Positive for dizziness. Negative for loss of consciousness, weakness and headaches. Psychiatric/Behavioral: Negative for depression and memory loss.        Physical Exam Constitutional: She is oriented to person, place, and time. She appears well-developed and well-nourished. HENT:   Head: Normocephalic and atraumatic. Eyes: Conjunctivae are normal. Pupils are equal, round, and reactive to light. Neck: Normal range of motion. Neck supple. No JVD present. Cardiovascular: Normal rate, regular rhythm, S1 normal and S2 normal.   No extrasystoles are present. PMI is not displaced. Exam reveals no gallop and no friction rub. Murmur heard. High-pitched blowing early systolic murmur is present with a grade of 1/6  at the apex  Pulses:       Carotid pulses are 3+ on the right side, and 3+ on the left side. Pulmonary/Chest: Effort normal. She has no rales. Abdominal: Soft. There is no tenderness. Musculoskeletal: She exhibits edema. 1-2+   Neurological: She is alert and oriented to person, place, and time. No cranial nerve deficit. Skin: Skin is warm and dry. Psychiatric: She has a normal mood and affect. Her behavior is normal.     Visit Vitals    /60    Pulse 62    Ht 5' 3\" (1.6 m)    Wt 54.9 kg (121 lb)    SpO2 98%    BMI 21.43 kg/m2       Past Medical History:   Diagnosis Date    Actinic keratoses     Angioedema 2003    and respiratory failure from reaction to lisinopril    Atypical chest pain     Cardiac cath 02/06/2009    mRCA 100% (overlapping 2.75 x 28 & 2.75 x 12 Vision BMS). LM patent. mLAD 80%. CX 25%. OM1 100%. LVEDP 28.  EF 45%. Inferobasal akn. MR 3+. Left RA 50%. RA 7.  RV 40/4. PA 37/13. W 18.  CO/CI 5.0/3.2 (TD); 4.0 x 2.6 (Dick).  Cardiac echocardiogram 01/15/2017    Severe LVE. EF 20%. Severe diffuse hypk. Indeterminate diastolic fx. RVSP 30 mmHg. Mild DORIS. Mod MR. Mod PI.  IVCE. Left pleural effusion. Similar to study of 2/9/10.  Cardiac nuclear imaging test, abnormal 09/27/2010    Mod basal inferior, early mid inferior infarction w/o ischemia. Mild mid anterior artifact vs infarction w/o ischemia. Massive LVE. EF 18%. Marked septal, inferior hypk. Inferoapical, inferoseptal dysk. Anterior hypk. RVE suggested.  Cardiovascular ankle brachial index 03/28/2014    No arterial disease at rest bilaterally. R АНДРЕЙ 1.02.  L АНДРЕЙ 1.05.  R DBI 0.48. L DBI 0.64. Exercise deferred.  Cardiovascular RLE venous duplex 01/14/2017    Right leg:  No DVT.  Carotid duplex 03/28/2014    Mild <50% bilateral ICA plaquing.  Coronary artery disease     Most likely myocardial infarction 12-08; stenting of the RCA and LIMA to LAD with bypass surgery later that year    Dyslipidemia     GERD (gastroesophageal reflux disease)     Heart failure 6/09,2/10,11/10    readmission to hospital 6/09, 2/10, 11/10    Hypertension     Hypothyroidism     Incomplete bundle branch block     left    Intraventricular conduction delay     with QRS duration of 120 msec with incomplete left BBB    Irritable bowel syndrome     Ischemic cardiomyopathy     EF now in the 15% to 20% range, last assessed September 2010 by nuclear stress test.    Mitral regurgitation     moderately severe, s/p mitral valve repair in 05/2009    Osteoarthritis of knee     Pacemaker 10/10    dual-chamber ICD placed, with LV lead placement with current RV pacing worsening mitral regurgitation    Pulmonary hypertension (HCC)     moderate    Renal artery stenosis (HCC)     Respiratory failure ([address] 2003    Rheumatoid arthritis ([address]     Systolic CHF, chronic ([address]     class III to IV, with multiple recurrent admissions in the past year.  Thrombocytopenia ([address]        Social History     Social History    Marital status:      Spouse name: N/A    Number of children: N/A    Years of education: N/A     Occupational History    Not on file.      Social History Main Topics    Smoking status: Never Smoker    Smokeless tobacco: Never Used    Alcohol use No    Drug use: No    Sexual activity: Not on file     Other Topics Concern    Not on file     Social History Narrative       Family History   Problem Relation Age of Onset    Hypertension Sister        Past Surgical History:   Procedure Laterality Date    HX BLADDER REPAIR      HX CORONARY STENT PLACEMENT  2/6/09    totally occluded right coronary artery was stented successfully with 2.75 mm x 28 mm Vision stent overlapping with the 12 mm length Vision stent    HX HEART CATHETERIZATION  02/09    with stenting of the total occluded right coronary artery    HX HEART VALVE SURGERY      1. Mitral valve repair with size 30 CE Physio ring. 2. Single LIMA to the LAD    HX HEMORRHOIDECTOMY      HX HYSTERECTOMY      HX PACEMAKER  10/13/10    dual-chamber Medtronic AICD without left ventricular lead       Current Outpatient Prescriptions   Medication Sig Dispense Refill    hydrALAZINE (APRESOLINE) 25 mg tablet Take 3 Tabs by mouth three (3) times daily for 30 days. (Patient taking differently: Take 25 mg by mouth three (3) times daily.) 270 Tab 1    lidocaine (ASPERCREME, LIDOCAINE,) 4 % topical cream Apply  to affected area two (2) times daily as needed for Pain. Apply on skin around the pacemaker. 1 Tube 1    isosorbide mononitrate ER (IMDUR) 120 mg CR tablet Take 1 Tab by mouth daily. 30 Tab 6    Ca-D3-mag ox-zinc--romina-bor (CALCIUM 600-D3 PLUS) 600 mg calcium- 800 unit-50 mg tab Take 1 Each by mouth daily.  b complex vitamins (B COMPLEX 1) tablet Take 1 Tab by mouth daily.  atorvastatin (LIPITOR) 20 mg tablet Take 1 Tab by mouth nightly. 30 Tab 0    carvedilol (COREG) 25 mg tablet Take 1 Tab by mouth two (2) times daily (with meals). 60 Tab 6    fluticasone (FLONASE) 50 mcg/actuation nasal spray 2 Sprays by Both Nostrils route nightly.  omeprazole (PRILOSEC) 20 mg capsule Take 20 mg by mouth daily.  ferrous sulfate (IRON) 325 mg (65 mg iron) tablet Take 650 mg by mouth daily.  flaxseed oil 1,000 mg cap Take  by mouth two (2) times a day.       LACTOBACILLUS ACIDOPHILUS (PROBIOTIC PO) Take  by mouth two (2) times a day. 2 tabs bid      BUMETANIDE (BUMEX PO) Take 1 mg by mouth as directed. Take one tablet daily and as directed if weight up 2+ pounds       potassium chloride (K-DUR) 20 mEq tablet Take 20 mEq by mouth two (2) times a day.  CRANBERRY EXT/C/L. SPOROGENES (CRANBERRY-PROBIOTICS-VITAMIN C PO) Take 1,500 mg by mouth two (2) times a day. 3 tablets      aspirin 81 mg tablet Take 81 mg by mouth daily.  levothyroxine (LEVOXYL) 50 mcg tablet Take 50 mcg by mouth daily (before breakfast).  nitroglycerin (NITROSTAT) 0.4 mg SL tablet 0.4 mg by SubLINGual route every five (5) minutes as needed. for chest pain         EKG: unchanged from previous tracings, atrially paced rhythm,LVH/Strain  . ASSESSMENT and PLAN  Encounter Diagnoses   Name Primary?  Systolic CHF, acute on chronic (HCC) Yes    Angina of effort ([address]     Ischemic cardiomyopathy, CABG X1 LIMA to the LAD 2009/EF 25%     Mitral regurgitation, s/p MV repair # 30 Physio Ring     Pulmonary hypertension ([address]     Biventricular ICD (implantable cardiac defibrillator) in place,no LV lead    The patient remains to be in class IV heart failure with spiking OptiVol. Her lungs are clear presumably because of the pulmonary hypertension. She does have 1-2+ leg edema secondary to heart failure, as well as pulmonary hypertension. She continues with exertional angina. It has been very frustrating and difficult to treat this patient because of the limited options with her advanced age. She does not want to have cardiac catheterization and it does not appear to be a good option anymore at this time. She does not wish to take ranolazine because of the high cost.  We will try her on Entresto for her heart failure at this time. Her ultimate prognosis is very poor.

## 2017-07-31 NOTE — MR AVS SNAPSHOT
Visit Information Date & Time Provider Department Dept. Phone Encounter #  
 7/31/2017 10:20 AM Bro Gallardo MD [facility] [id number] Your Appointments 12/5/2017  2:00 PM  
Follow Up with Bro Gallardo MD  
[facility] ([facility]) Appt Note: 6 month follow up [address] [id number] [phone] [address] [place] [address] [address] Upcoming Health Maintenance Date Due DTaP/Tdap/Td series (1 - Tdap) 6/21/1949 ZOSTER VACCINE AGE 60> 4/21/1988 GLAUCOMA SCREENING Q2Y 6/21/1993 OSTEOPOROSIS SCREENING (DEXA) 6/21/1993 Pneumococcal 65+ High/Highest Risk (1 of 2 - PCV13) 6/21/1993 MEDICARE YEARLY EXAM 6/21/1993 INFLUENZA AGE 9 TO ADULT 8/1/2017 Allergies as of 7/31/2017  Review Complete On: 7/31/2017 By: Bro Gallardo MD  
  
 Severity Noted Reaction Type Reactions Ace Inhibitors    Swelling  
 wheezing Ciprofloxacin    Nausea Only Dizziness, nausea Codeine    Nausea Only Cozaar [Losartan]    Swelling Tongue edema and generalized swelling Darvocet A500 [Propoxyphene N-acetaminophen]    Other (comments) Dizziness Gabapentin    Swelling (Neurontin) Lisinopril    Swelling Throat swells Magox [Magnesium Oxide]  03/22/2011    Diarrhea Norvasc [Amlodipine]    Swelling Tongue edema and generalized swelling Smz-tmp Ds [Sulfamethoxazole-trimethoprim]    Swelling Throat swells Current Immunizations  Never Reviewed No immunizations on file. Not reviewed this visit You Were Diagnosed With   
  
 Codes Comments [facility] ([facility]    -  Primary ICD-10-CM: I20.8 ICD-9-CM: 413.9 Ischemic cardiomyopathy     ICD-10-CM: I25.5 ICD-9-CM: 414.8 Systolic CHF, acute on chronic (HCC)     ICD-10-CM: Q42.16 ICD-9-CM: 428.23, 428.0 S/P mitral valve repair     ICD-10-CM: L41.115 ICD-9-CM: V45.89 Vitals BP Pulse Height(growth percentile) Weight(growth percentile) SpO2 BMI  
 140/60 62 5' 3\" (1.6 m) 121 lb (54.9 kg) 98% 21.43 kg/m2 OB Status Smoking Status Hysterectomy Never Smoker Vitals History BMI and BSA Data Body Mass Index Body Surface Area  
 21.43 kg/m 2 1.56 m 2 Preferred Pharmacy Pharmacy Name [facility]  [facility], [address] [place] [address],[address] [place] [address] [phone] Your Updated Medication List  
  
   
This list is accurate as of: 7/31/17 11:36 AM.  Always use your most recent med list.  
  
  
  
  
 aspirin 81 mg tablet Take 81 mg by mouth daily. atorvastatin 20 mg tablet Commonly known as:  LIPITOR Take 1 Tab by mouth nightly. B COMPLEX 1 tablet Generic drug:  b complex vitamins Take 1 Tab by mouth daily. BUMEX PO Take 1 mg by mouth as directed. Take one tablet daily and as directed if weight up 2+ pounds CALCIUM 600-D3 PLUS 600 mg calcium- 800 unit-50 mg Tab Generic drug:  Ca-D3-mag ox-zinc--romina-bor Take 1 Each by mouth daily. carvedilol 25 mg tablet Commonly known as:  Tiara Ready Take 1 Tab by mouth two (2) times daily (with meals). CRANBERRY-PROBIOTICS-VITAMIN C PO Take 1,500 mg by mouth two (2) times a day. 3 tablets  
  
 flaxseed oil 1,000 mg Cap Take  by mouth two (2) times a day. FLONASE 50 mcg/actuation nasal spray Generic drug:  fluticasone 2 Sprays by Both Nostrils route nightly. hydrALAZINE 25 mg tablet Commonly known as:  APRESOLINE Take 3 Tabs by mouth three (3) times daily for 30 days. Iron 325 mg (65 mg iron) tablet Generic drug:  ferrous sulfate Take 650 mg by mouth daily. isosorbide mononitrate  mg CR tablet Commonly known as:  IMDUR Take 1 Tab by mouth daily. K-DUR 20 mEq tablet Generic drug:  potassium chloride Take 20 mEq by mouth two (2) times a day. LEVOXYL 50 mcg tablet Generic drug:  levothyroxine Take 50 mcg by mouth daily (before breakfast). lidocaine 4 % topical cream  
Commonly known as:  ASPERCREME (LIDOCAINE) Apply  to affected area two (2) times daily as needed for Pain. Apply on skin around the pacemaker. nitroglycerin 0.4 mg SL tablet Commonly known as:  NITROSTAT  
0.4 mg by SubLINGual route every five (5) minutes as needed. for chest pain PriLOSEC 20 mg capsule Generic drug:  omeprazole Take 20 mg by mouth daily. PROBIOTIC PO Take  by mouth two (2) times a day. 2 tabs bid We Performed the Following AMB POC EKG ROUTINE W/ 12 LEADS, INTER & REP [57333 CPT(R)] To-Do List   
 07/31/2017 Lab:  METABOLIC PANEL, BASIC Patient Instructions Begin Entresto 24mg 2x daily Stop hydrozoline If you have any further questions or concerns, please contact our office. [phone] Introducing [facility]! [facility] introduces [employer] patient portal. Now you can access parts of your medical record, email your doctor's office, and request medication refills online. 1. In your internet browser, go to [email or web address] 2. Click on the First Time User? Click Here link in the Sign In box. You will see the New Member Sign Up page. 3. Enter your [employer] Access Code exactly as it appears below. You will not need to use this code after youve completed the sign-up process. If you do not sign up before the expiration date, you must request a new code. · [employer] Access Code: [email or web address] Expires: 10/21/2017  8:12 PM 
 
4. Enter the last four digits of your Social Security Number (xxxx) and Date of Birth (mm/dd/yyyy) as indicated and click Submit. You will be taken to the next sign-up page. 5. Create a [employer] ID.  This will be your [employer] login ID and cannot be changed, so think of one that is secure and easy to remember. 6. Create a Patsnap password. You can change your password at any time. 7. Enter your Password Reset Question and Answer. This can be used at a later time if you forget your password. 8. Enter your e-mail address. You will receive e-mail notification when new information is available in [address] 9. Click Sign Up. You can now view and download portions of your medical record. 10. Click the Download Summary menu link to download a portable copy of your medical information. If you have questions, please visit the Frequently Asked Questions section of the [employer] website. Remember, P[employer]nap is NOT to be used for urgent needs. For medical emergencies, dial 911. Now available from your iPhone and Android! Please provide this summary of care documentation to your next provider. Your primary care clinician is listed as Ashley Montes. If you have any questions after today's visit, please call [phone].

## 2017-08-15 NOTE — PATIENT INSTRUCTIONS
Increase Bumex to 1 mg twice a day on Sun, Tue and Thu. Continue Bumex 1 mg daily on Mon, Wed, Fri, and Sat.   Take potassium chloride 20 mEq with each dose of Bumex  BMP in 2 weeks  Restart Hydralazine 25 mg three times a day  BP check in 2 weeks  Discontinue Entresto 24/26 mg

## 2017-08-15 NOTE — PROGRESS NOTES
Jayleen Dewitt is a 80 y.o. female that is here for a blood pressure check. Her current medications are listed below. Current Outpatient Prescriptions   Medication Sig    lidocaine (ASPERCREME, LIDOCAINE,) 4 % topical cream Apply  to affected area two (2) times daily as needed for Pain. Apply on skin around the pacemaker.  isosorbide mononitrate ER (IMDUR) 120 mg CR tablet Take 1 Tab by mouth daily.  Ca-D3-mag ox-zinc--romina-bor (CALCIUM 600-D3 PLUS) 600 mg calcium- 800 unit-50 mg tab Take 1 Each by mouth daily.  b complex vitamins (B COMPLEX 1) tablet Take 1 Tab by mouth daily.  atorvastatin (LIPITOR) 20 mg tablet Take 1 Tab by mouth nightly.  carvedilol (COREG) 25 mg tablet Take 1 Tab by mouth two (2) times daily (with meals).  fluticasone (FLONASE) 50 mcg/actuation nasal spray 2 Sprays by Both Nostrils route as needed.  omeprazole (PRILOSEC) 20 mg capsule Take 20 mg by mouth daily.  ferrous sulfate (IRON) 325 mg (65 mg iron) tablet Take 650 mg by mouth daily.  flaxseed oil 1,000 mg cap Take  by mouth two (2) times a day.  LACTOBACILLUS ACIDOPHILUS (PROBIOTIC PO) Take  by mouth two (2) times a day. 2 tabs bid    BUMETANIDE (BUMEX PO) Take 1 mg by mouth as directed. Take one tablet on Mon, Wed, Fri, and Sun. Take one tablet twice a day on Sun, Tue, and Thu.    potassium chloride (K-DUR) 20 mEq tablet Take 20 mEq by mouth two (2) times a day.  CRANBERRY EXT/C/L. SPOROGENES (CRANBERRY-PROBIOTICS-VITAMIN C PO) Take 1,500 mg by mouth two (2) times a day. 3 tablets    aspirin 81 mg tablet Take 81 mg by mouth daily.  levothyroxine (LEVOXYL) 50 mcg tablet Take 50 mcg by mouth daily (before breakfast).  nitroglycerin (NITROSTAT) 0.4 mg SL tablet 0.4 mg by SubLINGual route every five (5) minutes as needed. for chest pain    hydrALAZINE (APRESOLINE) 25 mg tablet Take 3 Tabs by mouth three (3) times daily for 30 days.  (Patient taking differently: Take 25 mg by mouth three (3) times daily.)     No current facility-administered medications for this visit. Her   Visit Vitals    /50 (BP 1 Location: Left arm, BP Patient Position: Sitting)    Pulse 64    Wt 55.8 kg (123 lb)    SpO2 99%    BMI 21.79 kg/m2       Patient seen for 2 week BP check after starting Entresto 24/26 mg on 7/31/17. She states she took her last dose of Entresto on Sunday 8/13/17 because she ran out of samples. She reports shortness of breath however she states it has improved with the use of home oxygen. She also reports chest pain on exertion but states it is not as bad. Patient has lower leg edema, more in right than in left. Patient and daughter in law state patient is not able to afford Entresto. I discussed with Dr. Farzana Carias. Verbal order and read back per Dr. Farzana Carias. Patient to restart Hydralazine 25 mg three times a day, discontinue Entresto, increase Bumex to 1 mg twice a day on Sun, Tue and Thu, continue Bumex 1 mg daily on Mon, Wed, Fri, and Sat. Take potassium chloride 20 mEq with each dose of Bumex. BMP and BP in 2 weeks.  Patient and daughter in law Александр Zheng informed of instructions, read back and verbalized understanding Luciana Vasquez LPN

## 2017-08-25 NOTE — TELEPHONE ENCOUNTER
----- Message from Oswaldo Verduzco sent at 8/25/2017 12:01 PM EDT -----      ----- Message -----     From: Ana Rosa Viigl MD     Sent: 8/22/2017   6:05 PM       To: Trina Ledbetter LPN ok

## 2017-08-28 NOTE — PROGRESS NOTES
Alphonso Fernández is a 80 y.o. female that is here for a blood pressure check. Her current medications are listed below. Current Outpatient Prescriptions   Medication Sig    isosorbide mononitrate ER (IMDUR) 120 mg CR tablet Take 1 Tab by mouth daily.  Ca-D3-mag ox-zinc--romina-bor (CALCIUM 600-D3 PLUS) 600 mg calcium- 800 unit-50 mg tab Take 1 Each by mouth daily.  b complex vitamins (B COMPLEX 1) tablet Take 1 Tab by mouth daily.  atorvastatin (LIPITOR) 20 mg tablet Take 1 Tab by mouth nightly.  carvedilol (COREG) 25 mg tablet Take 1 Tab by mouth two (2) times daily (with meals).  omeprazole (PRILOSEC) 20 mg capsule Take 20 mg by mouth daily.  ferrous sulfate (IRON) 325 mg (65 mg iron) tablet Take 650 mg by mouth daily.  flaxseed oil 1,000 mg cap Take  by mouth two (2) times a day.  LACTOBACILLUS ACIDOPHILUS (PROBIOTIC PO) Take  by mouth two (2) times a day. 2 tabs bid    BUMETANIDE (BUMEX PO) Take 1 mg by mouth as directed. Take one tablet on Mon, Wed, Fri, and Sun. Take one tablet twice a day on Sun, Tue, and Thu.    potassium chloride (K-DUR) 20 mEq tablet Take 20 mEq by mouth two (2) times a day. Take one tablet with each dose of Bumex    CRANBERRY EXT/C/L. SPOROGENES (CRANBERRY-PROBIOTICS-VITAMIN C PO) Take 1,500 mg by mouth two (2) times a day. 3 tablets    aspirin 81 mg tablet Take 81 mg by mouth daily.  levothyroxine (LEVOXYL) 50 mcg tablet Take 50 mcg by mouth daily (before breakfast).  nitroglycerin (NITROSTAT) 0.4 mg SL tablet 0.4 mg by SubLINGual route every five (5) minutes as needed. for chest pain    lidocaine (ASPERCREME, LIDOCAINE,) 4 % topical cream Apply  to affected area two (2) times daily as needed for Pain. Apply on skin around the pacemaker.  fluticasone (FLONASE) 50 mcg/actuation nasal spray 2 Sprays by Both Nostrils route as needed. No current facility-administered medications for this visit.                 Her   Visit Vitals    /58 (BP 1 Location: Left arm, BP Patient Position: Sitting)    Pulse 65    Wt 54.9 kg (121 lb)    SpO2 98%    BMI 21.43 kg/m2       Patient seen for 2 week BP check after Bumex was increased. Patient has edema in lower extremities however it has improved since last visit. Patient reports improvement in edema as well however she also states the edema worsens throughout the day and is better in the morning. She reports pain around AICD. Lidocaine is listed on patient's medication list for this complaint however she said lidocaine does not alleviate the pain. Area is not tender to touch and she feels pain is not superficial. BMP results from 8/22/17 are in Epic and were reviewed by Dr. Radha Michael and he stated ok. Patient and daughter in law were make aware. AICD was checked by Georgina Graff RN, she will discuss findings with Dr. Radha Michael, however she reports OptiVol level is down. Patient and daughter in law were made aware of OptiVol level during appointment. They were informed this encounter will be forwarded to Dr. Radha Michael for review and they will be contacted with any changes.  They verbalized understanding Rose Griffiths LPN

## 2017-08-28 NOTE — Clinical Note
2 week BP check after Bumex 1 mg was increased. She is taking one tablet on Mon, Wed, Fri, and Sat. She is taking one tablet twice a day on Sun, Tue, and Thu. Polo Enter checked OptiVol and it is better.  BMP was done on 8/22/17 - creatine was 1.72 compared to 1.49 on 7/31/17

## 2017-10-10 NOTE — PROGRESS NOTES
HISTORY OF PRESENT ILLNESS  Galileo Sweet is a 80 y.o. female. HPI  She looks pretty good today and is as feisty as ever. She indicates that she continues with the chest pain with exertion, but it is not any different from July of 2017 or before. She continues with dyspnea on exertion, but she has been able to do her household chores with no difficulty  at least inside the house. She still cleans her house and does her own dishes and cooking. She has had occasional fluttering, but no prolonged palpitations. She has had no syncope. She indicates that her leg edema has been very mild and intermittent. She has been feeling about the same during the past six months or so. Around [place] time in 2008, she had crushing substernal pressure-type pain, which lasted for half an hour or so, and subsequent myocardial perfusion imaging demonstrated moderate sized scarring in the mid to basal inferior wall and diminished LV function with EF in the 42% range. It was felt that she sustained myocardial infarction in December 2008. She subsequently underwent cardiac catheterization on February 6, 2009, which demonstrated:    1. Total occlusion of the dominant RCA in the mid segment with some collaterals to the distal vessel. 2. Patent left main trunk with ostial 30% stenosis. 3. Large wraparound LAD with 75-80% stenosis in the mid segment. 4. 20-30% stenosis of the circumflex coronary artery throughout with total occlusion of the first obtuse marginal branch at its ostium with no significant distal collaterals.    5. Inferobasal akinesis with overall EF in the 45% range and LVEDP up to 28 mmHg.    6. Moderate to severe mitral regurgitation. 7. 60% stenosis of the right renal artery. 8. Mild to moderate pulmonary hypertension with PA pressure of 37/13 mmHg.    Subsequently, the totally occluded right coronary artery was stented successfully with 2.75 mm x 28 mm Vision stent overlapping with the 12 mm length Vision stent. The plan was to see if the mitral regurgitation would improve by stenting the right coronary artery. She had repeat echocardiogram thereafter on March 4, 2009, which demonstrated EF in the 40-45% range. There was moderately severe mitral regurgitation with a regurgitant volume of 48 cc and moderate pulmonary hypertension with PA pressure estimated at 48 mmHg. Left ventricular end-diastolic dimension was 6.3 cm and end-systolic dimension was 4.9 cm, and left atrial dimension was 4.3 cm. It was felt that she could be a potential candidate for coronary artery bypass grafting and mitral valve repair as a first option and stenting of the LAD, along with medical treatment for mitral regurgitation as a second option.      She subsequently underwent open-heart surgery, which consisted of:  1. Mitral valve repair with size 30 CE Physio ring.    2. Single LIMA to the LAD.       She was readmitted to [facility] on June 26, 2009 with chest pain and heart failure at which time repeat echocardiogram demonstrated further deterioration of her LV function with EF in the 20-25% range and mild to moderate residual mitral regurgitation and PA pressure of 60 mmHg. She was diuresed and continued on Coreg with improvement. She has been readmitted to the hospital three times with recurrent congestive heart failure but one in February 2010 was following the hernia operation. We have considered biventricular ICD but she did not qualify because of the relatively narrow QRS complex at only 110 msec. She underwent stress nuclear cardiac imaging on September 27, 2010, prior to ICD implantation, which demonstrated no significant ischemia but severe LV dysfunction, with EF in the 18% range. She ultimately underwent ICD implantation on October 13, 2010, but unfortunately attempted placement of a left ventricular lead was without success because of inadequate anatomy.  She was left with dual-chamber Medtronic AICD without left ventricular lead.    We considered the surgical replacement of the left ventricular lead so that she would have a biventricular ICD. However, the QRS duration is only at most 110 ms, which may put her out of the classic indication. If she has further recurrence of decompensated congestive heart failure, we would certainly reconsider placement of the left ventricular leads surgically. She was admitted to the hospital in January of 2017 with sudden onset of shortness of breath, orthopnea and PND. She was found to be in decompensated congestive heart failure and was treated accordingly with improvement. There was minimal troponin I elevation, which was flat. She had a repeat echocardiogram during the hospitalization, which once again demonstrated severe LV dysfunction with EF in the 20% range. PA pressure was estimated at around 30 mmHg. The left atrium was severely dilated with a volume index of 53 mL/meter2. There was no significant aortic stenosis. There was moderate mitral regurgitation. There was only a trivial pericardial effusion. She was found to be anemic with H & H at 9.2 and 28.3, respectively, with the indices not indicative of iron deficiency anemia. Her BUN/creatinine were 29 and 1.71 on 01/23/2017. Her NT-proBNP level was 175,000. When she was seen in my office on 3/28/17 my recommendation was as follows:  Greta Pinto continues with frequent exertional chest pain daily. Currently, she shows no physical findings of decompensated congestive heart failure. We discussed the aggressive treatment with invasive diagnostic workup, such as cardiac catheterization, and continued medical treatment. Three members of her family asked multiple questions and we discussed the concerns about her anemia, renal dysfunction and advanced age. They were advised that she needs to be evaluated and treated for anemia first before proceeding with the invasive workup and treatment.  They were also advised that there is a great deal of risk of renal failure with the dye load particularly with her coexisting renal dysfunction and advanced age. I recommended a trial of Ranexa and there is a great deal of concern over the high cost of the medication. I recommended that we try her on the Ranexa samples first before making a final decision on continued treatment. We will apply for a Ranexa sample. For the time being, I would try a higher dose of Imdur at 120 mg per day from 30 mg. After a long discussion, the patient and her family members agreed to go on with continued medical treatment first before any consideration of invasive workup and treatment. She was admitted to the hospital on 07/22/2017 with severe shortness of breath and hypoxemia. She has been placed on home oxygen since then. It was decided not to proceed with the invasive workup because of her advanced age and renal dysfunction. The patient refused to have a cardiac catheterization because of her fear of ending up with dialysis.       She had a repeat echocardiogram on 07/23/2017, which demonstrated severe LV dysfunction with EF in the range of 15-20% with grade II diastolic dysfunction parameters. PA pressure was estimated in the range of 60-65 mmHg. The left atrium was severely dilated. There was moderate to severe mitral regurgitation. There was a small pericardial effusion circumferentially in the anterior and posterior wall. Review of Systems   Constitutional: Positive for malaise/fatigue. Negative for weight loss. HENT: Negative for hearing loss. Eyes: Negative for blurred vision and double vision. Respiratory: Positive for shortness of breath. Cardiovascular: Positive for chest pain, palpitations and leg swelling. Negative for orthopnea, claudication and PND. Gastrointestinal: Negative for blood in stool, heartburn and melena. Genitourinary: Positive for frequency. Negative for dysuria, hematuria and urgency.    Musculoskeletal: Positive for joint pain. Negative for back pain. Skin: Negative for itching and rash. Neurological: Positive for weakness. Negative for dizziness and loss of consciousness. Psychiatric/Behavioral: Negative for depression and memory loss. Physical Exam   Constitutional: She is oriented to person, place, and time. She appears well-developed and well-nourished. HENT:   Head: Normocephalic and atraumatic. Eyes: Conjunctivae are normal. Pupils are equal, round, and reactive to light. Neck: Normal range of motion. Neck supple. No JVD present. Cardiovascular: Normal rate, regular rhythm, S1 normal and S2 normal.   No extrasystoles are present. PMI is not displaced. Exam reveals no gallop and no friction rub. Murmur heard. High-pitched blowing early systolic murmur is present with a grade of 1/6  at the apex  Pulses:       Carotid pulses are 3+ on the right side, and 3+ on the left side. Pulmonary/Chest: Effort normal. She has no rales. Abdominal: Soft. There is no tenderness. Musculoskeletal: She exhibits edema. 1+   Neurological: She is alert and oriented to person, place, and time. No cranial nerve deficit. Skin: Skin is warm and dry. Psychiatric: She has a normal mood and affect. Her behavior is normal.     Visit Vitals    /60    Pulse 61    Ht 5' 3\" (1.6 m)    Wt 55.8 kg (123 lb)    SpO2 99%    BMI 21.79 kg/m2       Past Medical History:   Diagnosis Date    Actinic keratoses     Angioedema 2003    and respiratory failure from reaction to lisinopril    Atypical chest pain     Cardiac cath 02/06/2009    mRCA 100% (overlapping 2.75 x 28 & 2.75 x 12 Vision BMS). LM patent. mLAD 80%. CX 25%. OM1 100%. LVEDP 28.  EF 45%. Inferobasal akn. MR 3+. Left RA 50%. RA 7.  RV 40/4. PA 37/13. W 18.  CO/CI 5.0/3.2 (TD); 4.0 x 2.6 (Dick).  Cardiac echocardiogram 01/15/2017    Severe LVE. EF 20%. Severe diffuse hypk. Indeterminate diastolic fx. RVSP 30 mmHg. Mild DORIS. Mod MR. Mod PI.  IVCE. Left pleural effusion. Similar to study of 2/9/10.  Cardiac nuclear imaging test, abnormal 09/27/2010    Mod basal inferior, early mid inferior infarction w/o ischemia. Mild mid anterior artifact vs infarction w/o ischemia. Massive LVE. EF 18%. Marked septal, inferior hypk. Inferoapical, inferoseptal dysk. Anterior hypk. RVE suggested.  Cardiovascular ankle brachial index 03/28/2014    No arterial disease at rest bilaterally. R АНДРЕЙ 1.02.  L АНДРЕЙ 1.05.  R DBI 0.48. L DBI 0.64. Exercise deferred.  Cardiovascular RLE venous duplex 01/14/2017    Right leg:  No DVT.  Carotid duplex 03/28/2014    Mild <50% bilateral ICA plaquing.  Coronary artery disease     Most likely myocardial infarction 12-08; stenting of the RCA and LIMA to LAD with bypass surgery later that year    Dyslipidemia     GERD (gastroesophageal reflux disease)     Heart failure 6/09,2/10,11/10    readmission to hospital 6/09, 2/10, 11/10    Hypertension     Hypothyroidism     Incomplete bundle branch block     left    Intraventricular conduction delay     with QRS duration of 120 msec with incomplete left BBB    Irritable bowel syndrome     Ischemic cardiomyopathy     EF now in the 15% to 20% range, last assessed September 2010 by nuclear stress test.    Mitral regurgitation     moderately severe, s/p mitral valve repair in 05/2009    Osteoarthritis of knee     Pacemaker 10/10    dual-chamber ICD placed, with LV lead placement with current RV pacing worsening mitral regurgitation    Pulmonary hypertension     moderate    Renal artery stenosis (HCC)     Respiratory failure ([address] 2003    Rheumatoid [id number](382.4)     Systolic CHF, chronic (HCC)     class III to IV, with multiple recurrent admissions in the past year.     Thrombocytopenia ([address]        Social History     Social History    Marital status:      Spouse name: N/A    Number of children: N/A    Years of education: N/A Occupational History    Not on file. Social History Main Topics    Smoking status: Never Smoker    Smokeless tobacco: Never Used    Alcohol use No    Drug use: No    Sexual activity: Not on file     Other Topics Concern    Not on file     Social History Narrative       Family History   Problem Relation Age of Onset    Hypertension Sister        Past Surgical History:   Procedure Laterality Date    HX BLADDER REPAIR      HX CORONARY STENT PLACEMENT  2/6/09    totally occluded right coronary artery was stented successfully with 2.75 mm x 28 mm Vision stent overlapping with the 12 mm length Vision stent    HX HEART CATHETERIZATION  02/09    with stenting of the total occluded right coronary artery    HX HEART VALVE SURGERY      1. Mitral valve repair with size 30 CE Physio ring. 2. Single LIMA to the LAD    HX HEMORRHOIDECTOMY      HX HYSTERECTOMY      HX PACEMAKER  10/13/10    dual-chamber Medtronic AICD without left ventricular lead       Current Outpatient Prescriptions   Medication Sig Dispense Refill    lidocaine (ASPERCREME, LIDOCAINE,) 4 % topical cream Apply  to affected area two (2) times daily as needed for Pain. Apply on skin around the pacemaker. 1 Tube 1    isosorbide mononitrate ER (IMDUR) 120 mg CR tablet Take 1 Tab by mouth daily. 30 Tab 6    Ca-D3-mag ox-zinc--romina-bor (CALCIUM 600-D3 PLUS) 600 mg calcium- 800 unit-50 mg tab Take 1 Each by mouth daily.  b complex vitamins (B COMPLEX 1) tablet Take 1 Tab by mouth daily.  carvedilol (COREG) 25 mg tablet Take 1 Tab by mouth two (2) times daily (with meals). 60 Tab 6    fluticasone (FLONASE) 50 mcg/actuation nasal spray 2 Sprays by Both Nostrils route as needed.  omeprazole (PRILOSEC) 20 mg capsule Take 20 mg by mouth daily.  ferrous sulfate (IRON) 325 mg (65 mg iron) tablet Take 650 mg by mouth daily.  flaxseed oil 1,000 mg cap Take  by mouth two (2) times a day.       LACTOBACILLUS ACIDOPHILUS (PROBIOTIC PO) Take  by mouth two (2) times a day. 2 tabs bid      BUMETANIDE (BUMEX PO) Take 1 mg by mouth as directed. Take one tablet on Mon, Wed, Fri, and Sat. Take one tablet twice a day on Sun, Tue, and Thu.      potassium chloride (K-DUR) 20 mEq tablet Take 20 mEq by mouth. Take 1 tablet daily on M, W, F, Sat and take 1 tablet twice a day on T, Th, Sun      CRANBERRY EXT/C/L. SPOROGENES (CRANBERRY-PROBIOTICS-VITAMIN C PO) Take 1,500 mg by mouth two (2) times a day. 3 tablets      aspirin 81 mg tablet Take 81 mg by mouth daily.  levothyroxine (LEVOXYL) 50 mcg tablet Take 50 mcg by mouth daily (before breakfast).  nitroglycerin (NITROSTAT) 0.4 mg SL tablet 0.4 mg by SubLINGual route every five (5) minutes as needed. for chest pain         EKG: unchanged from previous tracings, atrially paced rhythm,LVH/Strain  . ASSESSMENT and PLAN  Encounter Diagnoses   Name Primary?  Angina of effort ([address] Yes    Ischemic cardiomyopathy, CABG X1 LIMA to the LAD 2009/EF 25%     Mitral regurgitation, s/p MV repair # 30 Physio Ring     Pulmonary hypertension     Biventricular ICD (implantable cardiac defibrillator) in place,no LV lead     Chronic combined systolic and diastolic congestive heart failure ([address]    She continues with exertional angina, which seems to have been stable. We have discussed cardiac catheterization and mutually agreed upon being conservative because of her advanced age. There has been no significant change in the frequency or intensity of her angina. She does not have decompensated congestive heart failure, but she continues with chronic congestive heart failure with dyspnea on exertion. I have considered a biventricular ICD upgrade, but this would require a surgical procedure to place the left ventricular lead since transvenous left ventricular lead placement was not successful in the past.  The patient wishes not to have any surgical procedures at this time and I concurred. She is not willing to take the Ranexa or Entresto because of the high cost.

## 2017-10-10 NOTE — PROGRESS NOTES
1. Have you been to the ER, urgent care clinic since your last visit? Hospitalized since your last visit? no  2. Have you seen or consulted any other health care providers outside of the [address] [place] [address] since your last visit? Include any pap smears or colon screening.  no

## 2017-10-10 NOTE — MR AVS SNAPSHOT
Visit Information Date & Time Provider Department Dept. Phone Encounter #  
 10/10/2017  2:00 PM Pepe Raza MD [facility] [id number] Your Appointments 4/10/2018  2:00 PM  
Follow Up with Pepe Raza MD  
[facility] ([address]) Appt Note: 6 month follow up; 6 month f/up [place] [phone]  
[phone] [address] [place] [address] [address] Upcoming Health Maintenance Date Due DTaP/Tdap/Td series (1 - Tdap) 6/21/1949 ZOSTER VACCINE AGE 60> 4/21/1988 GLAUCOMA SCREENING Q2Y 6/21/1993 OSTEOPOROSIS SCREENING (DEXA) 6/21/1993 Pneumococcal 65+ High/Highest Risk (1 of 2 - PCV13) 6/21/1993 MEDICARE YEARLY EXAM 6/21/1993 INFLUENZA AGE 9 TO ADULT 8/1/2017 Allergies as of 10/10/2017  Review Complete On: 10/10/2017 By: Pepe Raza MD  
  
 Severity Noted Reaction Type Reactions Ace Inhibitors    Swelling  
 wheezing Ciprofloxacin    Nausea Only Dizziness, nausea Codeine    Nausea Only Cozaar [Losartan]    Swelling Tongue edema and generalized swelling Darvocet A500 [Propoxyphene N-acetaminophen]    Other (comments) Dizziness Gabapentin    Swelling (Neurontin) Lisinopril    Swelling Throat swells Magox [Magnesium Oxide]  03/22/2011    Diarrhea Norvasc [Amlodipine]    Swelling Tongue edema and generalized swelling Smz-tmp Ds [Sulfamethoxazole-trimethoprim]    Swelling Throat swells Current Immunizations  Never Reviewed No immunizations on file. Not reviewed this visit You Were Diagnosed With   
  
 Codes Comments Systolic CHF, acute on chronic (HCC)    -  Primary ICD-10-CM: C01.86 ICD-9-CM: 428.23, 428.0 Angina of effort [facility])     ICD-10-CM: I20.8 ICD-9-CM: 413.9 Ischemic cardiomyopathy     ICD-10-CM: I25.5 ICD-9-CM: 414.8 S/P mitral valve repair     ICD-10-CM: W78.155 ICD-9-CM: V45.89 Pulmonary hypertension     ICD-10-CM: I27.20 ICD-9-CM: 416.8 Biventricular implantable cardioverter-defibrillator in situ     ICD-10-CM: Z95.810 ICD-9-CM: V45.02 Vitals BP Pulse Height(growth percentile) Weight(growth percentile) SpO2 BMI  
 142/60 61 5' 3\" (1.6 m) 123 lb (55.8 kg) 99% 21.79 kg/m2 OB Status Smoking Status Hysterectomy Never Smoker Vitals History BMI and BSA Data Body Mass Index Body Surface Area 21.79 kg/m 2 1.57 m 2 Preferred Pharmacy Pharmacy Name [facility][note 3] [facility] [address] [place] [address] [phone] Your Updated Medication List  
  
   
This list is accurate as of: 10/10/17  3:09 PM.  Always use your most recent med list.  
  
  
  
  
 aspirin 81 mg tablet Take 81 mg by mouth daily. B COMPLEX 1 tablet Generic drug:  b complex vitamins Take 1 Tab by mouth daily. BUMEX PO Take 1 mg by mouth as directed. Take one tablet on Mon, Wed, Fri, and Sat. Take one tablet twice a day on Sun, Tue, and Thu.  
  
 CALCIUM 600-D3 PLUS 600 mg calcium- 800 unit-50 mg Tab Generic drug:  Ca-D3-mag ox-zinc--romina-bor Take 1 Each by mouth daily. carvedilol 25 mg tablet Commonly known as:  Jestine Pellet Take 1 Tab by mouth two (2) times daily (with meals). CRANBERRY-PROBIOTICS-VITAMIN C PO Take 1,500 mg by mouth two (2) times a day. 3 tablets  
  
 flaxseed oil 1,000 mg Cap Take  by mouth two (2) times a day. FLONASE 50 mcg/actuation nasal spray Generic drug:  fluticasone 2 Sprays by Both Nostrils route as needed. Iron 325 mg (65 mg iron) tablet Generic drug:  ferrous sulfate Take 650 mg by mouth daily. isosorbide mononitrate  mg CR tablet Commonly known as:  IMDUR Take 1 Tab by mouth daily. K-DUR 20 mEq tablet Generic drug:  potassium chloride Take 20 mEq by mouth. Take 1 tablet daily on M, W, F, Sat and take 1 tablet twice a day on T, Th, Sun  
  
 LEVOXYL 50 mcg tablet Generic drug:  levothyroxine Take 50 mcg by mouth daily (before breakfast). lidocaine 4 % topical cream  
Commonly known as:  ASPERCREME (LIDOCAINE) Apply  to affected area two (2) times daily as needed for Pain. Apply on skin around the pacemaker. nitroglycerin 0.4 mg SL tablet Commonly known as:  NITROSTAT  
0.4 mg by SubLINGual route every five (5) minutes as needed. for chest pain PriLOSEC 20 mg capsule Generic drug:  omeprazole Take 20 mg by mouth daily. PROBIOTIC PO Take  by mouth two (2) times a day. 2 tabs bid We Performed the Following AMB POC EKG ROUTINE W/ 12 LEADS, INTER & REP [24218 CPT(R)] Introducing [facility]! [facility] introduces [employer] patient portal. Now you can access parts of your medical record, email your doctor's office, and request medication refills online. 1. In your internet browser, go to [email or web address] 2. Click on the First Time User? Click Here link in the Sign In box. You will see the New Member Sign Up page. 3. Enter your [employer] Access Code exactly as it appears below. You will not need to use this code after youve completed the sign-up process. If you do not sign up before the expiration date, you must request a new code. · [employer] Access Code: [email or web address] Expires: 10/21/2017  8:12 PM 
 
4. Enter the last four digits of your Social Security Number (xxxx) and Date of Birth (mm/dd/yyyy) as indicated and click Submit. You will be taken to the next sign-up page. 5. Create a [employer] ID. This will be your [employer] login ID and cannot be changed, so think of one that is secure and easy to remember. 6. Create a [employer] password. You can change your password at any time. 7. Enter your Password Reset Question and Answer.  This can be used at a later time if you forget your password. 8. Enter your e-mail address. You will receive e-mail notification when new information is available in [address] 9. Click Sign Up. You can now view and download portions of your medical record. 10. Click the Download Summary menu link to download a portable copy of your medical information. If you have questions, please visit the Frequently Asked Questions section of the [employer] website. Remember, [employer] is NOT to be used for urgent needs. For medical emergencies, dial 911. Now available from your iPhone and Android! Please provide this summary of care documentation to your next provider. Your primary care clinician is listed as Edwina Soto. If you have any questions after today's visit, please call [phone].

## 2018-01-01 ENCOUNTER — APPOINTMENT (OUTPATIENT)
Dept: GENERAL RADIOLOGY | Age: 83
DRG: 291 | End: 2018-01-01
Attending: EMERGENCY MEDICINE
Payer: MEDICARE

## 2018-01-01 ENCOUNTER — PATIENT OUTREACH (OUTPATIENT)
Dept: PULMONOLOGY | Age: 83
End: 2018-01-01

## 2018-01-01 ENCOUNTER — TELEPHONE (OUTPATIENT)
Dept: CARDIAC REHAB | Age: 83
End: 2018-01-01

## 2018-01-01 ENCOUNTER — HOSPITAL ENCOUNTER (INPATIENT)
Age: 83
LOS: 5 days | Discharge: HOSPICE | DRG: 291 | End: 2018-02-12
Attending: EMERGENCY MEDICINE | Admitting: FAMILY MEDICINE
Payer: MEDICARE

## 2018-01-01 VITALS
RESPIRATION RATE: 18 BRPM | TEMPERATURE: 97.5 F | HEART RATE: 62 BPM | SYSTOLIC BLOOD PRESSURE: 128 MMHG | DIASTOLIC BLOOD PRESSURE: 73 MMHG | HEIGHT: 63 IN | OXYGEN SATURATION: 97 % | WEIGHT: 127.7 LBS | BODY MASS INDEX: 22.62 KG/M2

## 2018-01-01 DIAGNOSIS — R06.09 DOE (DYSPNEA ON EXERTION): ICD-10-CM

## 2018-01-01 DIAGNOSIS — R06.02 SOB (SHORTNESS OF BREATH): Primary | ICD-10-CM

## 2018-01-01 DIAGNOSIS — Z95.810 AICD (AUTOMATIC CARDIOVERTER/DEFIBRILLATOR) PRESENT: ICD-10-CM

## 2018-01-01 DIAGNOSIS — N18.9 CHRONIC KIDNEY DISEASE, UNSPECIFIED CKD STAGE: ICD-10-CM

## 2018-01-01 DIAGNOSIS — R53.81 DEBILITY: ICD-10-CM

## 2018-01-01 DIAGNOSIS — Z71.89 ACP (ADVANCE CARE PLANNING): ICD-10-CM

## 2018-01-01 DIAGNOSIS — I50.43 ACUTE ON CHRONIC COMBINED SYSTOLIC AND DIASTOLIC CONGESTIVE HEART FAILURE (HCC): ICD-10-CM

## 2018-01-01 LAB
ALBUMIN SERPL-MCNC: 3.5 G/DL (ref 3.4–5)
ALBUMIN/GLOB SERPL: 1.1 {RATIO} (ref 0.8–1.7)
ALP SERPL-CCNC: 20 U/L (ref 45–117)
ALT SERPL-CCNC: 28 U/L (ref 13–56)
ANION GAP SERPL CALC-SCNC: 2 MMOL/L (ref 3–18)
ANION GAP SERPL CALC-SCNC: 4 MMOL/L (ref 3–18)
ANION GAP SERPL CALC-SCNC: 4 MMOL/L (ref 3–18)
ANION GAP SERPL CALC-SCNC: 5 MMOL/L (ref 3–18)
ANION GAP SERPL CALC-SCNC: 6 MMOL/L (ref 3–18)
ANION GAP SERPL CALC-SCNC: 7 MMOL/L (ref 3–18)
AST SERPL-CCNC: 37 U/L (ref 15–37)
ATRIAL RATE: 69 BPM
ATRIAL RATE: 72 BPM
BASOPHILS # BLD: 0 K/UL (ref 0–0.1)
BASOPHILS NFR BLD: 0 % (ref 0–2)
BILIRUB SERPL-MCNC: 0.5 MG/DL (ref 0.2–1)
BNP SERPL-MCNC: ABNORMAL PG/ML (ref 0–1800)
BUN SERPL-MCNC: 27 MG/DL (ref 7–18)
BUN SERPL-MCNC: 28 MG/DL (ref 7–18)
BUN SERPL-MCNC: 28 MG/DL (ref 7–18)
BUN SERPL-MCNC: 29 MG/DL (ref 7–18)
BUN SERPL-MCNC: 31 MG/DL (ref 7–18)
BUN SERPL-MCNC: 32 MG/DL (ref 7–18)
BUN/CREAT SERPL: 14 (ref 12–20)
BUN/CREAT SERPL: 15 (ref 12–20)
BUN/CREAT SERPL: 16 (ref 12–20)
CALCIUM SERPL-MCNC: 9.1 MG/DL (ref 8.5–10.1)
CALCIUM SERPL-MCNC: 9.1 MG/DL (ref 8.5–10.1)
CALCIUM SERPL-MCNC: 9.3 MG/DL (ref 8.5–10.1)
CALCIUM SERPL-MCNC: 9.4 MG/DL (ref 8.5–10.1)
CALCIUM SERPL-MCNC: 9.8 MG/DL (ref 8.5–10.1)
CALCIUM SERPL-MCNC: 9.9 MG/DL (ref 8.5–10.1)
CALCULATED P AXIS, ECG09: 63 DEGREES
CALCULATED R AXIS, ECG10: -58 DEGREES
CALCULATED R AXIS, ECG10: -65 DEGREES
CALCULATED T AXIS, ECG11: 125 DEGREES
CALCULATED T AXIS, ECG11: 138 DEGREES
CHLORIDE SERPL-SCNC: 100 MMOL/L (ref 100–108)
CHLORIDE SERPL-SCNC: 101 MMOL/L (ref 100–108)
CHLORIDE SERPL-SCNC: 101 MMOL/L (ref 100–108)
CHLORIDE SERPL-SCNC: 102 MMOL/L (ref 100–108)
CHLORIDE SERPL-SCNC: 103 MMOL/L (ref 100–108)
CHLORIDE SERPL-SCNC: 103 MMOL/L (ref 100–108)
CK MB CFR SERPL CALC: 1.3 % (ref 0–4)
CK MB CFR SERPL CALC: 3.4 % (ref 0–4)
CK MB CFR SERPL CALC: NORMAL % (ref 0–4)
CK MB SERPL-MCNC: 1.2 NG/ML (ref 5–25)
CK MB SERPL-MCNC: 3.7 NG/ML (ref 5–25)
CK MB SERPL-MCNC: <1 NG/ML (ref 5–25)
CK SERPL-CCNC: 108 U/L (ref 26–192)
CK SERPL-CCNC: 45 U/L (ref 26–192)
CK SERPL-CCNC: 90 U/L (ref 26–192)
CO2 SERPL-SCNC: 35 MMOL/L (ref 21–32)
CO2 SERPL-SCNC: 37 MMOL/L (ref 21–32)
CO2 SERPL-SCNC: 38 MMOL/L (ref 21–32)
CO2 SERPL-SCNC: 39 MMOL/L (ref 21–32)
CREAT SERPL-MCNC: 1.85 MG/DL (ref 0.6–1.3)
CREAT SERPL-MCNC: 1.87 MG/DL (ref 0.6–1.3)
CREAT SERPL-MCNC: 1.92 MG/DL (ref 0.6–1.3)
CREAT SERPL-MCNC: 1.99 MG/DL (ref 0.6–1.3)
CREAT SERPL-MCNC: 2.04 MG/DL (ref 0.6–1.3)
CREAT SERPL-MCNC: 2.1 MG/DL (ref 0.6–1.3)
DIAGNOSIS, 93000: NORMAL
DIAGNOSIS, 93000: NORMAL
DIFFERENTIAL METHOD BLD: ABNORMAL
EOSINOPHIL # BLD: 0.1 K/UL (ref 0–0.4)
EOSINOPHIL # BLD: 0.2 K/UL (ref 0–0.4)
EOSINOPHIL NFR BLD: 1 % (ref 0–5)
EOSINOPHIL NFR BLD: 2 % (ref 0–5)
EOSINOPHIL NFR BLD: 3 % (ref 0–5)
ERYTHROCYTE [DISTWIDTH] IN BLOOD BY AUTOMATED COUNT: 14.6 % (ref 11.6–14.5)
ERYTHROCYTE [DISTWIDTH] IN BLOOD BY AUTOMATED COUNT: 14.8 % (ref 11.6–14.5)
ERYTHROCYTE [DISTWIDTH] IN BLOOD BY AUTOMATED COUNT: 15.4 % (ref 11.6–14.5)
ERYTHROCYTE [DISTWIDTH] IN BLOOD BY AUTOMATED COUNT: 15.5 % (ref 11.6–14.5)
ERYTHROCYTE [DISTWIDTH] IN BLOOD BY AUTOMATED COUNT: 15.7 % (ref 11.6–14.5)
ERYTHROCYTE [DISTWIDTH] IN BLOOD BY AUTOMATED COUNT: 16 % (ref 11.6–14.5)
GLOBULIN SER CALC-MCNC: 3.2 G/DL (ref 2–4)
GLUCOSE SERPL-MCNC: 111 MG/DL (ref 74–99)
GLUCOSE SERPL-MCNC: 116 MG/DL (ref 74–99)
GLUCOSE SERPL-MCNC: 120 MG/DL (ref 74–99)
GLUCOSE SERPL-MCNC: 126 MG/DL (ref 74–99)
GLUCOSE SERPL-MCNC: 127 MG/DL (ref 74–99)
GLUCOSE SERPL-MCNC: 135 MG/DL (ref 74–99)
HCT VFR BLD AUTO: 29.3 % (ref 35–45)
HCT VFR BLD AUTO: 30.6 % (ref 35–45)
HCT VFR BLD AUTO: 30.7 % (ref 35–45)
HCT VFR BLD AUTO: 31 % (ref 35–45)
HCT VFR BLD AUTO: 33 % (ref 35–45)
HCT VFR BLD AUTO: 33.3 % (ref 35–45)
HGB BLD-MCNC: 10.1 G/DL (ref 12–16)
HGB BLD-MCNC: 10.4 G/DL (ref 12–16)
HGB BLD-MCNC: 9 G/DL (ref 12–16)
HGB BLD-MCNC: 9.1 G/DL (ref 12–16)
HGB BLD-MCNC: 9.4 G/DL (ref 12–16)
HGB BLD-MCNC: 9.4 G/DL (ref 12–16)
INR PPP: 1.3 (ref 0.8–1.2)
LYMPHOCYTES # BLD: 0.8 K/UL (ref 0.9–3.6)
LYMPHOCYTES # BLD: 0.9 K/UL (ref 0.9–3.6)
LYMPHOCYTES # BLD: 1 K/UL (ref 0.9–3.6)
LYMPHOCYTES NFR BLD: 17 % (ref 21–52)
LYMPHOCYTES NFR BLD: 18 % (ref 21–52)
LYMPHOCYTES NFR BLD: 20 % (ref 21–52)
MCH RBC QN AUTO: 29.6 PG (ref 24–34)
MCH RBC QN AUTO: 29.8 PG (ref 24–34)
MCH RBC QN AUTO: 29.9 PG (ref 24–34)
MCH RBC QN AUTO: 30.1 PG (ref 24–34)
MCH RBC QN AUTO: 30.3 PG (ref 24–34)
MCH RBC QN AUTO: 30.4 PG (ref 24–34)
MCHC RBC AUTO-ENTMCNC: 29.6 G/DL (ref 31–37)
MCHC RBC AUTO-ENTMCNC: 30.3 G/DL (ref 31–37)
MCHC RBC AUTO-ENTMCNC: 30.6 G/DL (ref 31–37)
MCHC RBC AUTO-ENTMCNC: 30.7 G/DL (ref 31–37)
MCHC RBC AUTO-ENTMCNC: 30.7 G/DL (ref 31–37)
MCHC RBC AUTO-ENTMCNC: 31.2 G/DL (ref 31–37)
MCV RBC AUTO: 100 FL (ref 74–97)
MCV RBC AUTO: 97.3 FL (ref 74–97)
MCV RBC AUTO: 97.4 FL (ref 74–97)
MCV RBC AUTO: 98 FL (ref 74–97)
MCV RBC AUTO: 98.7 FL (ref 74–97)
MCV RBC AUTO: 98.7 FL (ref 74–97)
MONOCYTES # BLD: 0.6 K/UL (ref 0.05–1.2)
MONOCYTES # BLD: 0.7 K/UL (ref 0.05–1.2)
MONOCYTES # BLD: 0.8 K/UL (ref 0.05–1.2)
MONOCYTES # BLD: 0.8 K/UL (ref 0.05–1.2)
MONOCYTES NFR BLD: 13 % (ref 3–10)
MONOCYTES NFR BLD: 15 % (ref 3–10)
MONOCYTES NFR BLD: 16 % (ref 3–10)
MONOCYTES NFR BLD: 17 % (ref 3–10)
NEUTS SEG # BLD: 2.9 K/UL (ref 1.8–8)
NEUTS SEG # BLD: 3 K/UL (ref 1.8–8)
NEUTS SEG # BLD: 3.6 K/UL (ref 1.8–8)
NEUTS SEG NFR BLD: 64 % (ref 40–73)
NEUTS SEG NFR BLD: 64 % (ref 40–73)
NEUTS SEG NFR BLD: 65 % (ref 40–73)
P-R INTERVAL, ECG05: 168 MS
P-R INTERVAL, ECG05: 188 MS
PLATELET # BLD AUTO: 105 K/UL (ref 135–420)
PLATELET # BLD AUTO: 105 K/UL (ref 135–420)
PLATELET # BLD AUTO: 107 K/UL (ref 135–420)
PLATELET # BLD AUTO: 109 K/UL (ref 135–420)
PLATELET # BLD AUTO: 109 K/UL (ref 135–420)
PLATELET # BLD AUTO: 113 K/UL (ref 135–420)
PMV BLD AUTO: 10.5 FL (ref 9.2–11.8)
PMV BLD AUTO: 10.7 FL (ref 9.2–11.8)
PMV BLD AUTO: 10.9 FL (ref 9.2–11.8)
PMV BLD AUTO: 11.1 FL (ref 9.2–11.8)
POTASSIUM SERPL-SCNC: 3.4 MMOL/L (ref 3.5–5.5)
POTASSIUM SERPL-SCNC: 3.4 MMOL/L (ref 3.5–5.5)
POTASSIUM SERPL-SCNC: 3.5 MMOL/L (ref 3.5–5.5)
POTASSIUM SERPL-SCNC: 3.5 MMOL/L (ref 3.5–5.5)
POTASSIUM SERPL-SCNC: 3.6 MMOL/L (ref 3.5–5.5)
POTASSIUM SERPL-SCNC: 3.9 MMOL/L (ref 3.5–5.5)
PROT SERPL-MCNC: 6.7 G/DL (ref 6.4–8.2)
PROTHROMBIN TIME: 15.5 SEC (ref 11.5–15.2)
Q-T INTERVAL, ECG07: 442 MS
Q-T INTERVAL, ECG07: 442 MS
QRS DURATION, ECG06: 124 MS
QRS DURATION, ECG06: 126 MS
QTC CALCULATION (BEZET), ECG08: 473 MS
QTC CALCULATION (BEZET), ECG08: 483 MS
RBC # BLD AUTO: 2.99 M/UL (ref 4.2–5.3)
RBC # BLD AUTO: 3.07 M/UL (ref 4.2–5.3)
RBC # BLD AUTO: 3.1 M/UL (ref 4.2–5.3)
RBC # BLD AUTO: 3.14 M/UL (ref 4.2–5.3)
RBC # BLD AUTO: 3.39 M/UL (ref 4.2–5.3)
RBC # BLD AUTO: 3.42 M/UL (ref 4.2–5.3)
SODIUM SERPL-SCNC: 142 MMOL/L (ref 136–145)
SODIUM SERPL-SCNC: 142 MMOL/L (ref 136–145)
SODIUM SERPL-SCNC: 143 MMOL/L (ref 136–145)
SODIUM SERPL-SCNC: 144 MMOL/L (ref 136–145)
SODIUM SERPL-SCNC: 144 MMOL/L (ref 136–145)
SODIUM SERPL-SCNC: 146 MMOL/L (ref 136–145)
TROPONIN I SERPL-MCNC: 0.02 NG/ML (ref 0–0.04)
TROPONIN I SERPL-MCNC: 0.03 NG/ML (ref 0–0.04)
TROPONIN I SERPL-MCNC: 0.03 NG/ML (ref 0–0.04)
VENTRICULAR RATE, ECG03: 69 BPM
VENTRICULAR RATE, ECG03: 72 BPM
WBC # BLD AUTO: 4.5 K/UL (ref 4.6–13.2)
WBC # BLD AUTO: 4.6 K/UL (ref 4.6–13.2)
WBC # BLD AUTO: 5.5 K/UL (ref 4.6–13.2)

## 2018-01-01 PROCEDURE — 65660000000 HC RM CCU STEPDOWN

## 2018-01-01 PROCEDURE — 74011250637 HC RX REV CODE- 250/637: Performed by: PHYSICIAN ASSISTANT

## 2018-01-01 PROCEDURE — 74011250637 HC RX REV CODE- 250/637: Performed by: STUDENT IN AN ORGANIZED HEALTH CARE EDUCATION/TRAINING PROGRAM

## 2018-01-01 PROCEDURE — 94761 N-INVAS EAR/PLS OXIMETRY MLT: CPT

## 2018-01-01 PROCEDURE — 83880 ASSAY OF NATRIURETIC PEPTIDE: CPT | Performed by: EMERGENCY MEDICINE

## 2018-01-01 PROCEDURE — 76450000000

## 2018-01-01 PROCEDURE — 74011250636 HC RX REV CODE- 250/636: Performed by: FAMILY MEDICINE

## 2018-01-01 PROCEDURE — 36415 COLL VENOUS BLD VENIPUNCTURE: CPT

## 2018-01-01 PROCEDURE — 93005 ELECTROCARDIOGRAM TRACING: CPT

## 2018-01-01 PROCEDURE — 80048 BASIC METABOLIC PNL TOTAL CA: CPT

## 2018-01-01 PROCEDURE — 74011250637 HC RX REV CODE- 250/637: Performed by: EMERGENCY MEDICINE

## 2018-01-01 PROCEDURE — 97116 GAIT TRAINING THERAPY: CPT

## 2018-01-01 PROCEDURE — 85025 COMPLETE CBC W/AUTO DIFF WBC: CPT | Performed by: EMERGENCY MEDICINE

## 2018-01-01 PROCEDURE — 74011250636 HC RX REV CODE- 250/636: Performed by: EMERGENCY MEDICINE

## 2018-01-01 PROCEDURE — 74011250637 HC RX REV CODE- 250/637: Performed by: FAMILY MEDICINE

## 2018-01-01 PROCEDURE — 85610 PROTHROMBIN TIME: CPT | Performed by: EMERGENCY MEDICINE

## 2018-01-01 PROCEDURE — 77010033678 HC OXYGEN DAILY

## 2018-01-01 PROCEDURE — 96374 THER/PROPH/DIAG INJ IV PUSH: CPT

## 2018-01-01 PROCEDURE — 85025 COMPLETE CBC W/AUTO DIFF WBC: CPT

## 2018-01-01 PROCEDURE — 77030027138 HC INCENT SPIROMETER -A

## 2018-01-01 PROCEDURE — 97161 PT EVAL LOW COMPLEX 20 MIN: CPT

## 2018-01-01 PROCEDURE — 99284 EMERGENCY DEPT VISIT MOD MDM: CPT

## 2018-01-01 PROCEDURE — 65270000029 HC RM PRIVATE

## 2018-01-01 PROCEDURE — 97110 THERAPEUTIC EXERCISES: CPT

## 2018-01-01 PROCEDURE — 80053 COMPREHEN METABOLIC PANEL: CPT | Performed by: EMERGENCY MEDICINE

## 2018-01-01 PROCEDURE — 71045 X-RAY EXAM CHEST 1 VIEW: CPT

## 2018-01-01 PROCEDURE — 82553 CREATINE MB FRACTION: CPT

## 2018-01-01 PROCEDURE — 74011250636 HC RX REV CODE- 250/636: Performed by: STUDENT IN AN ORGANIZED HEALTH CARE EDUCATION/TRAINING PROGRAM

## 2018-01-01 RX ORDER — HYDRALAZINE HYDROCHLORIDE 25 MG/1
25 TABLET, FILM COATED ORAL 3 TIMES DAILY
COMMUNITY

## 2018-01-01 RX ORDER — POTASSIUM CHLORIDE 20 MEQ/1
20 TABLET, EXTENDED RELEASE ORAL 2 TIMES DAILY
Status: DISCONTINUED | OUTPATIENT
Start: 2018-01-01 | End: 2018-01-01 | Stop reason: HOSPADM

## 2018-01-01 RX ORDER — BUMETANIDE 1 MG/1
1 TABLET ORAL 2 TIMES DAILY
Status: DISCONTINUED | OUTPATIENT
Start: 2018-01-01 | End: 2018-01-01 | Stop reason: HOSPADM

## 2018-01-01 RX ORDER — GUAIFENESIN 100 MG/5ML
81 LIQUID (ML) ORAL DAILY
Status: DISCONTINUED | OUTPATIENT
Start: 2018-01-01 | End: 2018-01-01 | Stop reason: HOSPADM

## 2018-01-01 RX ORDER — FLUTICASONE PROPIONATE 50 MCG
2 SPRAY, SUSPENSION (ML) NASAL AS NEEDED
Status: DISCONTINUED | OUTPATIENT
Start: 2018-01-01 | End: 2018-01-01

## 2018-01-01 RX ORDER — POTASSIUM CHLORIDE 750 MG/1
10 TABLET, EXTENDED RELEASE ORAL 2 TIMES DAILY
Status: DISCONTINUED | OUTPATIENT
Start: 2018-01-01 | End: 2018-01-01

## 2018-01-01 RX ORDER — SIMETHICONE 80 MG
40 TABLET,CHEWABLE ORAL
Status: DISCONTINUED | OUTPATIENT
Start: 2018-01-01 | End: 2018-01-01 | Stop reason: HOSPADM

## 2018-01-01 RX ORDER — ACETAMINOPHEN 500 MG
500 TABLET ORAL
Status: DISCONTINUED | OUTPATIENT
Start: 2018-01-01 | End: 2018-01-01 | Stop reason: HOSPADM

## 2018-01-01 RX ORDER — SALIVA STIMULANT COMB. NO.4
500 SPRAY, NON-AEROSOL (ML) MUCOUS MEMBRANE DAILY
Status: DISCONTINUED | OUTPATIENT
Start: 2018-01-01 | End: 2018-01-01

## 2018-01-01 RX ORDER — BUMETANIDE 1 MG/1
1 TABLET ORAL 2 TIMES DAILY
Status: DISCONTINUED | OUTPATIENT
Start: 2018-01-01 | End: 2018-01-01

## 2018-01-01 RX ORDER — FUROSEMIDE 10 MG/ML
40 INJECTION INTRAMUSCULAR; INTRAVENOUS 2 TIMES DAILY
Status: DISCONTINUED | OUTPATIENT
Start: 2018-01-01 | End: 2018-01-01

## 2018-01-01 RX ORDER — ATORVASTATIN CALCIUM 20 MG/1
20 TABLET, FILM COATED ORAL
Status: DISCONTINUED | OUTPATIENT
Start: 2018-01-01 | End: 2018-01-01 | Stop reason: HOSPADM

## 2018-01-01 RX ORDER — ATORVASTATIN CALCIUM 20 MG/1
20 TABLET, FILM COATED ORAL DAILY
Status: DISCONTINUED | OUTPATIENT
Start: 2018-01-01 | End: 2018-01-01

## 2018-01-01 RX ORDER — ATORVASTATIN CALCIUM 20 MG/1
20 TABLET, FILM COATED ORAL DAILY
COMMUNITY

## 2018-01-01 RX ORDER — PANTOPRAZOLE SODIUM 40 MG/1
40 TABLET, DELAYED RELEASE ORAL DAILY
Status: DISCONTINUED | OUTPATIENT
Start: 2018-01-01 | End: 2018-01-01 | Stop reason: HOSPADM

## 2018-01-01 RX ORDER — LANOLIN ALCOHOL/MO/W.PET/CERES
650 CREAM (GRAM) TOPICAL DAILY
Status: DISCONTINUED | OUTPATIENT
Start: 2018-01-01 | End: 2018-01-01 | Stop reason: HOSPADM

## 2018-01-01 RX ORDER — CARVEDILOL 25 MG/1
25 TABLET ORAL 2 TIMES DAILY WITH MEALS
Status: DISCONTINUED | OUTPATIENT
Start: 2018-01-01 | End: 2018-01-01 | Stop reason: HOSPADM

## 2018-01-01 RX ORDER — FUROSEMIDE 10 MG/ML
40 INJECTION INTRAMUSCULAR; INTRAVENOUS
Status: COMPLETED | OUTPATIENT
Start: 2018-01-01 | End: 2018-01-01

## 2018-01-01 RX ORDER — MORPHINE SULFATE 2 MG/ML
1 INJECTION, SOLUTION INTRAMUSCULAR; INTRAVENOUS ONCE
Status: ACTIVE | OUTPATIENT
Start: 2018-01-01 | End: 2018-01-01

## 2018-01-01 RX ORDER — NITROGLYCERIN 0.4 MG/1
0.4 TABLET SUBLINGUAL AS NEEDED
Status: DISCONTINUED | OUTPATIENT
Start: 2018-01-01 | End: 2018-01-01 | Stop reason: HOSPADM

## 2018-01-01 RX ORDER — ISOSORBIDE MONONITRATE 30 MG/1
120 TABLET, EXTENDED RELEASE ORAL DAILY
Status: DISCONTINUED | OUTPATIENT
Start: 2018-01-01 | End: 2018-01-01

## 2018-01-01 RX ORDER — LEVOTHYROXINE SODIUM 25 UG/1
50 TABLET ORAL
Status: DISCONTINUED | OUTPATIENT
Start: 2018-01-01 | End: 2018-01-01 | Stop reason: HOSPADM

## 2018-01-01 RX ORDER — FERROUS SULFATE, DRIED 160(50) MG
1 TABLET, EXTENDED RELEASE ORAL DAILY
Status: DISCONTINUED | OUTPATIENT
Start: 2018-01-01 | End: 2018-01-01 | Stop reason: HOSPADM

## 2018-01-01 RX ORDER — SIMETHICONE 125 MG
125 TABLET,CHEWABLE ORAL
Qty: 20 TAB | Refills: 0 | Status: SHIPPED | OUTPATIENT
Start: 2018-01-01

## 2018-01-01 RX ORDER — HYDRALAZINE HYDROCHLORIDE 25 MG/1
25 TABLET, FILM COATED ORAL 3 TIMES DAILY
Status: DISCONTINUED | OUTPATIENT
Start: 2018-01-01 | End: 2018-01-01 | Stop reason: HOSPADM

## 2018-01-01 RX ORDER — UREA 10 %
2 LOTION (ML) TOPICAL 2 TIMES DAILY
Status: DISCONTINUED | OUTPATIENT
Start: 2018-01-01 | End: 2018-01-01 | Stop reason: HOSPADM

## 2018-01-01 RX ORDER — ISOSORBIDE MONONITRATE 60 MG/1
120 TABLET, EXTENDED RELEASE ORAL DAILY
Status: DISCONTINUED | OUTPATIENT
Start: 2018-01-01 | End: 2018-01-01 | Stop reason: HOSPADM

## 2018-01-01 RX ADMIN — BUMETANIDE 1 MG: 1 TABLET ORAL at 18:29

## 2018-01-01 RX ADMIN — FUROSEMIDE 40 MG: 10 INJECTION, SOLUTION INTRAMUSCULAR; INTRAVENOUS at 10:32

## 2018-01-01 RX ADMIN — Medication 2 TABLET: at 09:44

## 2018-01-01 RX ADMIN — CARVEDILOL 25 MG: 25 TABLET, FILM COATED ORAL at 18:06

## 2018-01-01 RX ADMIN — POTASSIUM CHLORIDE 10 MEQ: 10 TABLET, EXTENDED RELEASE ORAL at 10:30

## 2018-01-01 RX ADMIN — Medication 2 TABLET: at 18:29

## 2018-01-01 RX ADMIN — POTASSIUM CHLORIDE 20 MEQ: 20 TABLET, EXTENDED RELEASE ORAL at 09:45

## 2018-01-01 RX ADMIN — PANTOPRAZOLE SODIUM 40 MG: 40 TABLET, DELAYED RELEASE ORAL at 08:30

## 2018-01-01 RX ADMIN — BUMETANIDE 1 MG: 1 TABLET ORAL at 08:27

## 2018-01-01 RX ADMIN — OYSTER SHELL CALCIUM WITH VITAMIN D 1 TABLET: 500; 200 TABLET, FILM COATED ORAL at 10:30

## 2018-01-01 RX ADMIN — BUMETANIDE 1 MG: 1 TABLET ORAL at 18:06

## 2018-01-01 RX ADMIN — BUMETANIDE 1 MG: 1 TABLET ORAL at 09:44

## 2018-01-01 RX ADMIN — POTASSIUM CHLORIDE 10 MEQ: 10 TABLET, EXTENDED RELEASE ORAL at 08:30

## 2018-01-01 RX ADMIN — ATORVASTATIN CALCIUM 20 MG: 20 TABLET, FILM COATED ORAL at 22:05

## 2018-01-01 RX ADMIN — Medication 650 MG: at 09:45

## 2018-01-01 RX ADMIN — POTASSIUM CHLORIDE 10 MEQ: 10 TABLET, EXTENDED RELEASE ORAL at 18:49

## 2018-01-01 RX ADMIN — HYDRALAZINE HYDROCHLORIDE 25 MG: 25 TABLET, FILM COATED ORAL at 08:30

## 2018-01-01 RX ADMIN — ASPIRIN 81 MG 81 MG: 81 TABLET ORAL at 10:31

## 2018-01-01 RX ADMIN — Medication 2 TABLET: at 18:06

## 2018-01-01 RX ADMIN — LEVOTHYROXINE SODIUM 50 MCG: 25 TABLET ORAL at 09:45

## 2018-01-01 RX ADMIN — OYSTER SHELL CALCIUM WITH VITAMIN D 1 TABLET: 500; 200 TABLET, FILM COATED ORAL at 08:26

## 2018-01-01 RX ADMIN — POTASSIUM CHLORIDE 10 MEQ: 10 TABLET, EXTENDED RELEASE ORAL at 18:43

## 2018-01-01 RX ADMIN — HYDRALAZINE HYDROCHLORIDE 25 MG: 25 TABLET, FILM COATED ORAL at 18:06

## 2018-01-01 RX ADMIN — OYSTER SHELL CALCIUM WITH VITAMIN D 1 TABLET: 500; 200 TABLET, FILM COATED ORAL at 10:24

## 2018-01-01 RX ADMIN — LEVOTHYROXINE SODIUM 50 MCG: 25 TABLET ORAL at 08:30

## 2018-01-01 RX ADMIN — PHENOBARBITAL, HYOSCYAMINE SULFATE, ATROPINE SULFATE, SCOPOLAMINE HYDROBROMIDE 30 ML: 16.2; .1037; .0194; .0065 ELIXIR ORAL at 14:41

## 2018-01-01 RX ADMIN — BUMETANIDE 1 MG: 1 TABLET ORAL at 18:43

## 2018-01-01 RX ADMIN — HYDRALAZINE HYDROCHLORIDE 25 MG: 25 TABLET, FILM COATED ORAL at 18:27

## 2018-01-01 RX ADMIN — FUROSEMIDE 40 MG: 10 INJECTION, SOLUTION INTRAMUSCULAR; INTRAVENOUS at 17:11

## 2018-01-01 RX ADMIN — ATORVASTATIN CALCIUM 20 MG: 20 TABLET, FILM COATED ORAL at 22:42

## 2018-01-01 RX ADMIN — ISOSORBIDE MONONITRATE 120 MG: 60 TABLET, EXTENDED RELEASE ORAL at 08:30

## 2018-01-01 RX ADMIN — OYSTER SHELL CALCIUM WITH VITAMIN D 1 TABLET: 500; 200 TABLET, FILM COATED ORAL at 08:30

## 2018-01-01 RX ADMIN — POTASSIUM CHLORIDE 10 MEQ: 10 TABLET, EXTENDED RELEASE ORAL at 18:06

## 2018-01-01 RX ADMIN — ATORVASTATIN CALCIUM 20 MG: 20 TABLET, FILM COATED ORAL at 23:24

## 2018-01-01 RX ADMIN — HYDRALAZINE HYDROCHLORIDE 25 MG: 25 TABLET, FILM COATED ORAL at 08:26

## 2018-01-01 RX ADMIN — HYDRALAZINE HYDROCHLORIDE 25 MG: 25 TABLET, FILM COATED ORAL at 22:42

## 2018-01-01 RX ADMIN — HYDRALAZINE HYDROCHLORIDE 25 MG: 25 TABLET, FILM COATED ORAL at 10:26

## 2018-01-01 RX ADMIN — HYDRALAZINE HYDROCHLORIDE 25 MG: 25 TABLET, FILM COATED ORAL at 10:31

## 2018-01-01 RX ADMIN — Medication 650 MG: at 08:26

## 2018-01-01 RX ADMIN — FUROSEMIDE 40 MG: 10 INJECTION, SOLUTION INTRAMUSCULAR; INTRAVENOUS at 18:27

## 2018-01-01 RX ADMIN — Medication 2 TABLET: at 18:43

## 2018-01-01 RX ADMIN — LEVOTHYROXINE SODIUM 50 MCG: 25 TABLET ORAL at 08:27

## 2018-01-01 RX ADMIN — ASPIRIN 81 MG 81 MG: 81 TABLET ORAL at 08:29

## 2018-01-01 RX ADMIN — HYDRALAZINE HYDROCHLORIDE 25 MG: 25 TABLET, FILM COATED ORAL at 09:45

## 2018-01-01 RX ADMIN — POTASSIUM CHLORIDE 20 MEQ: 20 TABLET, EXTENDED RELEASE ORAL at 18:29

## 2018-01-01 RX ADMIN — LEVOTHYROXINE SODIUM 50 MCG: 25 TABLET ORAL at 10:30

## 2018-01-01 RX ADMIN — CARVEDILOL 25 MG: 25 TABLET, FILM COATED ORAL at 10:31

## 2018-01-01 RX ADMIN — POTASSIUM CHLORIDE 10 MEQ: 10 TABLET, EXTENDED RELEASE ORAL at 10:23

## 2018-01-01 RX ADMIN — SIMETHICONE CHEW TAB 80 MG 40 MG: 80 TABLET ORAL at 11:37

## 2018-01-01 RX ADMIN — CARVEDILOL 25 MG: 25 TABLET, FILM COATED ORAL at 10:26

## 2018-01-01 RX ADMIN — ATORVASTATIN CALCIUM 20 MG: 20 TABLET, FILM COATED ORAL at 22:07

## 2018-01-01 RX ADMIN — Medication 2 TABLET: at 10:30

## 2018-01-01 RX ADMIN — CARVEDILOL 25 MG: 25 TABLET, FILM COATED ORAL at 18:43

## 2018-01-01 RX ADMIN — Medication 650 MG: at 10:27

## 2018-01-01 RX ADMIN — OYSTER SHELL CALCIUM WITH VITAMIN D 1 TABLET: 500; 200 TABLET, FILM COATED ORAL at 09:45

## 2018-01-01 RX ADMIN — HYDRALAZINE HYDROCHLORIDE 25 MG: 25 TABLET, FILM COATED ORAL at 18:50

## 2018-01-01 RX ADMIN — HYDRALAZINE HYDROCHLORIDE 25 MG: 25 TABLET, FILM COATED ORAL at 18:29

## 2018-01-01 RX ADMIN — ISOSORBIDE MONONITRATE 120 MG: 60 TABLET, EXTENDED RELEASE ORAL at 10:30

## 2018-01-01 RX ADMIN — ISOSORBIDE MONONITRATE 120 MG: 60 TABLET, EXTENDED RELEASE ORAL at 09:44

## 2018-01-01 RX ADMIN — CARVEDILOL 25 MG: 25 TABLET, FILM COATED ORAL at 08:26

## 2018-01-01 RX ADMIN — PANTOPRAZOLE SODIUM 40 MG: 40 TABLET, DELAYED RELEASE ORAL at 08:26

## 2018-01-01 RX ADMIN — HYDRALAZINE HYDROCHLORIDE 25 MG: 25 TABLET, FILM COATED ORAL at 21:38

## 2018-01-01 RX ADMIN — ISOSORBIDE MONONITRATE 120 MG: 60 TABLET, EXTENDED RELEASE ORAL at 10:28

## 2018-01-01 RX ADMIN — Medication 650 MG: at 10:30

## 2018-01-01 RX ADMIN — CARVEDILOL 25 MG: 25 TABLET, FILM COATED ORAL at 09:45

## 2018-01-01 RX ADMIN — LEVOTHYROXINE SODIUM 50 MCG: 25 TABLET ORAL at 10:27

## 2018-01-01 RX ADMIN — PANTOPRAZOLE SODIUM 40 MG: 40 TABLET, DELAYED RELEASE ORAL at 10:24

## 2018-01-01 RX ADMIN — CARVEDILOL 25 MG: 25 TABLET, FILM COATED ORAL at 08:30

## 2018-01-01 RX ADMIN — ASPIRIN 81 MG 81 MG: 81 TABLET ORAL at 08:26

## 2018-01-01 RX ADMIN — FUROSEMIDE 40 MG: 10 INJECTION, SOLUTION INTRAMUSCULAR; INTRAVENOUS at 08:30

## 2018-01-01 RX ADMIN — ASPIRIN 81 MG 81 MG: 81 TABLET ORAL at 10:25

## 2018-01-01 RX ADMIN — ISOSORBIDE MONONITRATE 120 MG: 60 TABLET, EXTENDED RELEASE ORAL at 08:26

## 2018-01-01 RX ADMIN — Medication 650 MG: at 09:21

## 2018-01-01 RX ADMIN — BUMETANIDE 1 MG: 1 TABLET ORAL at 18:49

## 2018-01-01 RX ADMIN — PANTOPRAZOLE SODIUM 40 MG: 40 TABLET, DELAYED RELEASE ORAL at 10:31

## 2018-01-01 RX ADMIN — Medication 2 TABLET: at 11:37

## 2018-01-01 RX ADMIN — HYDRALAZINE HYDROCHLORIDE 25 MG: 25 TABLET, FILM COATED ORAL at 18:43

## 2018-01-01 RX ADMIN — CARVEDILOL 25 MG: 25 TABLET, FILM COATED ORAL at 18:27

## 2018-01-01 RX ADMIN — POTASSIUM CHLORIDE 10 MEQ: 10 TABLET, EXTENDED RELEASE ORAL at 18:27

## 2018-01-01 RX ADMIN — HYDRALAZINE HYDROCHLORIDE 25 MG: 25 TABLET, FILM COATED ORAL at 22:06

## 2018-01-01 RX ADMIN — BUMETANIDE 1 MG: 1 TABLET ORAL at 10:31

## 2018-01-01 RX ADMIN — CARVEDILOL 25 MG: 25 TABLET, FILM COATED ORAL at 18:49

## 2018-01-01 RX ADMIN — ATORVASTATIN CALCIUM 20 MG: 20 TABLET, FILM COATED ORAL at 21:38

## 2018-01-01 RX ADMIN — HYDRALAZINE HYDROCHLORIDE 25 MG: 25 TABLET, FILM COATED ORAL at 23:24

## 2018-01-01 RX ADMIN — HYDRALAZINE HYDROCHLORIDE 25 MG: 25 TABLET, FILM COATED ORAL at 22:05

## 2018-01-01 RX ADMIN — ASPIRIN 81 MG 81 MG: 81 TABLET ORAL at 09:45

## 2018-01-01 RX ADMIN — CARVEDILOL 25 MG: 25 TABLET, FILM COATED ORAL at 18:29

## 2018-01-01 RX ADMIN — POTASSIUM CHLORIDE 10 MEQ: 10 TABLET, EXTENDED RELEASE ORAL at 08:26

## 2018-01-01 RX ADMIN — PANTOPRAZOLE SODIUM 40 MG: 40 TABLET, DELAYED RELEASE ORAL at 09:44

## 2018-02-07 PROBLEM — J90 PLEURAL EFFUSION: Status: ACTIVE | Noted: 2018-01-01

## 2018-02-07 PROBLEM — I50.9 CHF EXACERBATION (HCC): Status: ACTIVE | Noted: 2018-01-01

## 2018-02-07 NOTE — IP AVS SNAPSHOT
[address] [address] [place] Ratliff Patient: Cisco Dahl MRN: [id number] KQM:[id number] About your hospitalization You were admitted on:  February 7, 2018 You last received care in the:  [address] [place] [address] You were discharged on:  February 12, 2018 Why you were hospitalized Your primary diagnosis was:  Chf Exacerbation (Hcc) Your diagnoses also included:  Pleural Effusion Follow-up Information Follow up With Details Comments Contact Info Annalee Sam MD On 2/14/2018 at 11am with [address] [place] [address] 
[phone] Discharge Orders None A check maribell indicates which time of day the medication should be taken. My Medications START taking these medications Instructions Each Dose to Equal  
 Morning Noon Evening Bedtime  
 simethicone 125 mg chewable tablet Your last dose was: Your next dose is: Take 125 mg by mouth every eight (8) hours as needed for Flatulence. 125 mg CONTINUE taking these medications Instructions Each Dose to Equal  
 Morning Noon Evening Bedtime  
 aspirin 81 mg tablet Your last dose was: Your next dose is: Take 81 mg by mouth daily. 81 mg  
    
   
   
   
  
 atorvastatin 20 mg tablet Commonly known as:  LIPITOR Your last dose was: Your next dose is: Take 20 mg by mouth daily. 20 mg  
    
   
   
   
  
 BUMEX PO Your last dose was: Your next dose is: Take 1 mg by mouth two (2) times a day. 1 mg CALCIUM 600-D3 PLUS 600 mg calcium- 800 unit-50 mg Tab Generic drug:  Ca-D3-mag ox-zinc--romina-bor Your last dose was: Your next dose is: Take 1 Each by mouth daily. 1 Each  
    
   
   
   
  
 carvedilol 25 mg tablet Commonly known as:  Krystal Rings Your last dose was: Your next dose is: Take 1 Tab by mouth two (2) times daily (with meals). 25 mg CRANBERRY-PROBIOTICS-VITAMIN C PO Your last dose was: Your next dose is: Take 1,500 mg by mouth two (2) times a day. 3 tablets 1500 mg  
    
   
   
   
  
 flaxseed oil 1,000 mg Cap Your last dose was: Your next dose is: Take  by mouth two (2) times a day. FLONASE 50 mcg/actuation nasal spray Generic drug:  fluticasone Your last dose was: Your next dose is: 2 Sprays by Both Nostrils route as needed. 2 Spray  
    
   
   
   
  
 hydrALAZINE 25 mg tablet Commonly known as:  APRESOLINE Your last dose was: Your next dose is: Take 25 mg by mouth three (3) times daily. 25 mg Iron 325 mg (65 mg iron) tablet Generic drug:  ferrous sulfate Your last dose was: Your next dose is: Take 650 mg by mouth daily. 650 mg  
    
   
   
   
  
 isosorbide mononitrate  mg CR tablet Commonly known as:  IMDUR Your last dose was: Your next dose is: TAKE ONE TABLET BY MOUTH ONCE DAILY  
     
   
   
   
  
 K-DUR 20 mEq tablet Generic drug:  potassium chloride Your last dose was: Your next dose is: Take 10 mEq by mouth two (2) times a day. 10 mEq LEVOXYL 50 mcg tablet Generic drug:  levothyroxine Your last dose was: Your next dose is: Take 50 mcg by mouth daily (before breakfast). 50 mcg  
    
   
   
   
  
 nitroglycerin 0.4 mg SL tablet Commonly known as:  NITROSTAT Your last dose was: Your next dose is: 0.4 mg by SubLINGual route every five (5) minutes as needed. for chest pain  0.4 mg  
    
   
   
   
  
 PriLOSEC 20 mg capsule Generic drug:  omeprazole Your last dose was: Your next dose is: Take 20 mg by mouth daily. 20 mg Where to Get Your Medications Information on where to get these meds will be given to you by the nurse or doctor. ! Ask your nurse or doctor about these medications  
  simethicone 125 mg chewable tablet Discharge Instructions DISCHARGE SUMMARY from Nurse PATIENT INSTRUCTIONS: 
 
 
F-face looks uneven A-arms unable to move or move unevenly S-speech slurred or non-existent T-time-call 911 as soon as signs and symptoms begin-DO NOT go Back to bed or wait to see if you get better-TIME IS BRAIN. Warning Signs of HEART ATTACK Call 911 if you have these symptoms: 
? Chest discomfort. Most heart attacks involve discomfort in the center of the chest that lasts more than a few minutes, or that goes away and comes back. It can feel like uncomfortable pressure, squeezing, fullness, or pain. ? Discomfort in other areas of the upper body. Symptoms can include pain or discomfort in one or both arms, the back, neck, jaw, or stomach. ? Shortness of breath with or without chest discomfort. ? Other signs may include breaking out in a cold sweat, nausea, or lightheadedness. Don't wait more than five minutes to call [address]! Fast action can save your life. Calling 911 is almost always the fastest way to get lifesaving treatment. Emergency Medical Services staff can begin treatment when they arrive  up to an hour sooner than if someone gets to the hospital by car. The discharge information has been reviewed with the patient and caregiver. The patient and caregiver verbalized understanding. Discharge medications reviewed with the patient and caregiver and appropriate educational materials and side effects teaching were provided. [place] Joy[employer] Activation Thank you for requesting access to Marketbright. Please follow the instructions below to securely access and download your online medical record. Marketbright allows you to send messages to your doctor, view your test results, renew your prescriptions, schedule appointments, and more. How Do I Sign Up? 1. In your internet browser, go to [email or web address]. 2. Click on the First Time User? Click Here link in the Sign In box. You will see the New Member Sign Up page. 3. Enter your Marketbright Access Code exactly as it appears below. You will not need to use this code after youve completed the sign-up process. If you do not sign up before the expiration date, you must request a new code. Marketbright Access Code: [email or web address] Expires: 2018  7:59 PM (This is the date your Marketbright access code will ) 4. Enter the last four digits of your Social Security Number (xxxx) and Date of Birth (mm/dd/yyyy) as indicated and click Submit. You will be taken to the next sign-up page. 5. Create a Marketbright ID. This will be your Marketbright login ID and cannot be changed, so think of one that is secure and easy to remember. 6. Create a Marketbright password. You can change your password at any time. 7. Enter your Password Reset Question and Answer. This can be used at a later time if you forget your password. 8. Enter your e-mail address. You will receive e-mail notification when new information is available in [address] 9. Click Sign Up. You can now view and download portions of your medical record. 10. Click the Download Summary menu link to download a portable copy of your medical information. Additional Information If you have questions, please visit the Frequently Asked Questions section of the [employer] website at [email or web address] Remember, MyChart is NOT to be used for urgent needs. For medical emergencies, dial 911. Patient armband removed and shredded Learning About ACE Inhibitors for Heart Failure Introduction ACE (angiotensin-converting enzyme) inhibitors block an enzyme that makes blood vessels narrow. As a result, the blood vessels relax and widen. This lowers blood pressure. These medicines also put more water and salt into the urine. This also lowers blood pressure. In heart failure, your heart does not pump as much blood as your body needs. These medicines: · Make it easier for your heart to pump. · Help reduce symptoms. · Make a heart attack or stroke less likely. Examples These medicines include: · Benazepril (Lotensin). · Lisinopril (Prinivil, Zestril). · Ramipril (Altace). This is not a complete list. 
Possible side effects Side effects may include: · Cough. · Low blood pressure. You may feel dizzy and weak. · High potassium levels. · An allergic reaction. You may have other side effects or reactions not listed here. Check the information that comes with your medicine. What to know about taking this medicine · ACE inhibitors: ¨ Are often used to treat heart failure. They may be the only medicine used if your only symptoms are feeling tired and mildly short of breath with no fluid buildup (edema). But in most other cases, they are used with other medicines. ¨ Can cause a dry cough. If the cough is bad, talk to your doctor. You may need to try a different medicine. ¨ Can relieve symptoms, improve how you feel, and make it less likely you will need to stay in a hospital. And they can reduce the risk of early death. ¨ Can cause an allergic reaction of the skin.  Symptoms may range from mild swelling to painful welts. It is rare to have severe swelling that makes it hard to breathe. · Take your medicines exactly as prescribed. Call your doctor if you think you are having a problem with your medicine. · Check with your doctor or pharmacist before you use any other medicines. This includes over-the-counter medicines. Make sure your doctor knows all of the medicines, vitamins, herbal products, and supplements you take. Taking some medicines together can cause problems. · You should not take an ACE inhibitor if you are pregnant or planning to become pregnant. · You may need regular blood tests. Where can you learn more? Go to http://cosme-urszula.info/. Enter E173 in the search box to learn more about \"Learning About ACE Inhibitors for Heart Failure. \" Current as of: September 21, 2016 Content Version: 11.4 © 2522-8322 [employer]. Care instructions adapted under license by [employer] (which disclaims liability or warranty for this information). If you have questions about a medical condition or this instruction, always ask your healthcare professional. [address] [address] any warranty or liability for your use of this information. Advance Directives: Care Instructions Your Care Instructions An advance directive is a legal way to state your wishes at the end of your life. It tells your family and your doctor what to do if you can no longer say what you want. There are two main types of advance directives. You can change them any time that your wishes change. · A living will tells your family and your doctor your wishes about life support and other treatment. · A durable power of  for health care lets you name a person to make treatment decisions for you when you can't speak for yourself. This person is called a health care agent.  
If you do not have an advance directive, decisions about your medical care may be made by a doctor or a  who doesn't know you. It may help to think of an advance directive as a gift to the people who care for you. If you have one, they won't have to make tough decisions by themselves. Follow-up care is a key part of your treatment and safety. Be sure to make and go to all appointments, and call your doctor if you are having problems. It's also a good idea to know your test results and keep a list of the medicines you take. How can you care for yourself at home? · Discuss your wishes with your loved ones and your doctor. This way, there are no surprises. · Many states have a unique form. Or you might use a universal form that has been approved by many states. This kind of form can sometimes be completed and stored online. Your electronic copy will then be available wherever you have a connection to the Internet. In most cases, doctors will respect your wishes even if you have a form from a different state. · You don't need a  to do an advance directive. But you may want to get legal advice. · Think about these questions when you prepare an advance directive: ¨ Who do you want to make decisions about your medical care if you are not able to? Many people choose a family member or close friend. ¨ Do you know enough about life support methods that might be used? If not, talk to your doctor so you understand. ¨ What are you most afraid of that might happen? You might be afraid of having pain, losing your independence, or being kept alive by machines. ¨ Where would you prefer to die? Choices include your home, a hospital, or a nursing home. ¨ Would you like to have information about hospice care to support you and your family? ¨ Do you want to donate organs when you die? ¨ Do you want certain Shinto practices performed before you die? If so, put your wishes in the advance directive. · Read your advance directive every year, and make changes as needed. When should you call for help? Be sure to contact your doctor if you have any questions. Where can you learn more? Go to http://cosme-urszula.info/. Enter R264 in the search box to learn more about \"Advance Directives: Care Instructions. \" Current as of: September 24, 2016 Content Version: 11.4 © 6084-5551 Healthwise, Incorporated. Care instructions adapted under license by [employer] (which disclaims liability or warranty for this information). If you have questions about a medical condition or this instruction, always ask your healthcare professional. [address] any warranty or liability for your use of this information. ___________________________________________________________________________________________________________________________________ Emotienthart Announcement We are excited to announce that we are making your provider's discharge notes available to you in Korbit. You will see these notes when they are completed and signed by the physician that discharged you from your recent hospital stay. If you have any questions or concerns about any information you see in Glasshouse Internationalt, please call the Health Information Department where you were seen or reach out to your Primary Care Provider for more information about your plan of care. Introducing [facility]! [facility] introduces Korbit patient portal. Now you can access parts of your medical record, email your doctor's office, and request medication refills online. 1. In your internet browser, go to [email or web address] 2. Click on the First Time User? Click Here link in the Sign In box. You will see the New Member Sign Up page. 3. Enter your Korbit Access Code exactly as it appears below. You will not need to use this code after youve completed the sign-up process. If you do not sign up before the expiration date, you must request a new code. · Moko [employer] Access Code: [email or web address] Expires: 5/13/2018  7:59 PM 
 
4. Enter the last four digits of your Social Security Number (xxxx) and Date of Birth (mm/dd/yyyy) as indicated and click Submit. You will be taken to the next sign-up page. 5. Create a [employer]. Die FÃ¶rdermittelmanufakturt ID. This will be your Moko Social Media login ID and cannot be changed, so think of one that is secure and easy to remember. 6. Create a Moko [employer] Media password. You can change your password at any time. 7. Enter your Password Reset Question and Answer. This can be used at a later time if you forget your password. 8. Enter your e-mail address. You will receive e-mail notification when new information is available in [address] 9. Click Sign Up. You can now view and download portions of your medical record. 10. Click the Download Summary menu link to download a portable copy of your medical information. If you have questions, please visit the Frequently Asked Questions section of the [employer] website. Remember, [employer] Media is NOT to be used for urgent needs. For medical emergencies, dial 911. Now available from your iPhone and Android! Providers Seen During Your Hospitalization Provider Specialty Primary office phone Carmella Armstrong MD Emergency Medicine [phone] Isra Bryan MD Family Practice [phone] Janny Whittaker MD Family Practice [phone] Your Primary Care Physician (PCP) Primary Care Physician Office Phone Office Fax Mary Newton [phone] You are allergic to the following Allergen Reactions Ace Inhibitors Swelling  
 wheezing Ciprofloxacin Nausea Only Dizziness, nausea Codeine Nausea Only Cozaar (Losartan) Swelling Tongue edema and generalized swelling Darvocet A500 (Propoxyphene N-Acetaminophen) Other (comments) Dizziness Gabapentin Swelling (Neurontin) Lisinopril Swelling Throat swells Magox (Magnesium Oxide) Diarrhea Norvasc (Amlodipine) Swelling Tongue edema and generalized swelling Smz-Tmp Ds (Sulfamethoxazole-Trimethoprim) Swelling Throat swells Recent Documentation Height Weight Breastfeeding? BMI OB Status Smoking Status 1.6 m 57.9 kg No 22.62 kg/m2 Hysterectomy Never Smoker Emergency Contacts Name Discharge Info Relation Home Work Mobile [address] CAREGIVER [3] Other Relative [6] [phone] [address] [facility] CAREGIVER [3] Child [2] [phone] Melissa Esposito DISCHARGE CAREGIVER [3] Other Relative [6] [phone] Patient Belongings The following personal items are in your possession at time of discharge: 
  Dental Appliances: Lowers, Uppers, With patient  Visual Aid: With patient, Glasses      Home Medications: None   Jewelry: Earrings  Clothing: Pants, Undergarments, Shirt, Hat, Jacket/Coat, Footwear, Other (comment) (knee high stockings)    Other Valuables: Cane, Eyeglasses, Purse  Personal Items Sent to Safe: no 
 
  
  
Discharge Instructions Attachments/References ACE INHIBITORS: HEART FAILURE: GENERAL INFO (ENGLISH) Patient Handouts Learning About ACE Inhibitors for Heart Failure Introduction ACE (angiotensin-converting enzyme) inhibitors block an enzyme that makes blood vessels narrow. As a result, the blood vessels relax and widen. This lowers blood pressure. These medicines also put more water and salt into the urine. This also lowers blood pressure. In heart failure, your heart does not pump as much blood as your body needs. These medicines: · Make it easier for your heart to pump. · Help reduce symptoms. · Make a heart attack or stroke less likely. Examples These medicines include: · Benazepril (Lotensin). · Lisinopril (Prinivil, Zestril). · Ramipril (Altace). This is not a complete list. 
Possible side effects Side effects may include: · Cough. · Low blood pressure. You may feel dizzy and weak. · High potassium levels. · An allergic reaction. You may have other side effects or reactions not listed here. Check the information that comes with your medicine. What to know about taking this medicine · ACE inhibitors: ¨ Are often used to treat heart failure. They may be the only medicine used if your only symptoms are feeling tired and mildly short of breath with no fluid buildup (edema). But in most other cases, they are used with other medicines. ¨ Can cause a dry cough. If the cough is bad, talk to your doctor. You may need to try a different medicine. ¨ Can relieve symptoms, improve how you feel, and make it less likely you will need to stay in a hospital. And they can reduce the risk of early death. ¨ Can cause an allergic reaction of the skin. Symptoms may range from mild swelling to painful welts. It is rare to have severe swelling that makes it hard to breathe. · Take your medicines exactly as prescribed. Call your doctor if you think you are having a problem with your medicine. · Check with your doctor or pharmacist before you use any other medicines. This includes over-the-counter medicines. Make sure your doctor knows all of the medicines, vitamins, herbal products, and supplements you take. Taking some medicines together can cause problems. · You should not take an ACE inhibitor if you are pregnant or planning to become pregnant. · You may need regular blood tests. Where can you learn more? Go to http://cosme-urszula.info/. Enter Z409 in the search box to learn more about \"Learning About ACE Inhibitors for Heart Failure. \" Current as of: September 21, 2016 Content Version: 11.4 © 3601-4408 Social Point.  Care instructions adapted under license by [employer] (which disclaims liability or warranty for this information). If you have questions about a medical condition or this instruction, always ask your healthcare professional. [address] any warranty or liability for your use of this information. Please provide this summary of care documentation to your next provider. Signatures-by signing, you are acknowledging that this After Visit Summary has been reviewed with you and you have received a copy. Patient Signature:  ____________________________________________________________ Date:  ____________________________________________________________  
  
[facility] Provider Signature:  ____________________________________________________________ Date:  ____________________________________________________________

## 2018-02-07 NOTE — IP AVS SNAPSHOT
[address] [address] [place] Patient: Marilynn Armstrong MRN: [id number] [facility]:[birth year]26 A check maribell indicates which time of day the medication should be taken. My Medications START taking these medications Instructions Each Dose to Equal  
 Morning Noon Evening Bedtime  
 simethicone 125 mg chewable tablet Your last dose was: Your next dose is: Take 125 mg by mouth every eight (8) hours as needed for Flatulence. 125 mg CONTINUE taking these medications Instructions Each Dose to Equal  
 Morning Noon Evening Bedtime  
 aspirin 81 mg tablet Your last dose was: Your next dose is: Take 81 mg by mouth daily. 81 mg  
    
   
   
   
  
 atorvastatin 20 mg tablet Commonly known as:  LIPITOR Your last dose was: Your next dose is: Take 20 mg by mouth daily. 20 mg  
    
   
   
   
  
 BUMEX PO Your last dose was: Your next dose is: Take 1 mg by mouth two (2) times a day. 1 mg CALCIUM 600-D3 PLUS 600 mg calcium- 800 unit-50 mg Tab Generic drug:  Ca-D3-mag ox-zinc--romina-bor Your last dose was: Your next dose is: Take 1 Each by mouth daily. 1 Each  
    
   
   
   
  
 carvedilol 25 mg tablet Commonly known as:  Rissa Ulloa Your last dose was: Your next dose is: Take 1 Tab by mouth two (2) times daily (with meals). 25 mg CRANBERRY-PROBIOTICS-VITAMIN C PO Your last dose was: Your next dose is: Take 1,500 mg by mouth two (2) times a day. 3 tablets 1500 mg  
    
   
   
   
  
 flaxseed oil 1,000 mg Cap Your last dose was: Your next dose is: Take  by mouth two (2) times a day. FLONASE 50 mcg/actuation nasal spray Generic drug:  fluticasone Your last dose was: Your next dose is: 2 Sprays by Both Nostrils route as needed. 2 Spray  
    
   
   
   
  
 hydrALAZINE 25 mg tablet Commonly known as:  APRESOLINE Your last dose was: Your next dose is: Take 25 mg by mouth three (3) times daily. 25 mg Iron 325 mg (65 mg iron) tablet Generic drug:  ferrous sulfate Your last dose was: Your next dose is: Take 650 mg by mouth daily. 650 mg  
    
   
   
   
  
 isosorbide mononitrate  mg CR tablet Commonly known as:  IMDUR Your last dose was: Your next dose is: TAKE ONE TABLET BY MOUTH ONCE DAILY  
     
   
   
   
  
 K-DUR 20 mEq tablet Generic drug:  potassium chloride Your last dose was: Your next dose is: Take 10 mEq by mouth two (2) times a day. 10 mEq LEVOXYL 50 mcg tablet Generic drug:  levothyroxine Your last dose was: Your next dose is: Take 50 mcg by mouth daily (before breakfast). 50 mcg  
    
   
   
   
  
 nitroglycerin 0.4 mg SL tablet Commonly known as:  NITROSTAT Your last dose was: Your next dose is: 0.4 mg by SubLINGual route every five (5) minutes as needed. for chest pain 0.4 mg  
    
   
   
   
  
 PriLOSEC 20 mg capsule Generic drug:  omeprazole Your last dose was: Your next dose is: Take 20 mg by mouth daily. 20 mg Where to Get Your Medications Information on where to get these meds will be given to you by the nurse or doctor. ! Ask your nurse or doctor about these medications  
  simethicone 125 mg chewable tablet

## 2018-02-07 NOTE — Clinical Note
Status[de-identified] Inpatient [101] Type of Bed: Telemetry [19] Inpatient Hospitalization Certified Necessary for the Following Reasons: 3. Patient receiving treatment that can only be provided in an inpatient setting (further clarification in H&P documentation) Admitting Diagnosis: Pleural effusion [639089] Admitting Physician: Liliya Horne [[id number]] Attending Physician: Liliya Horne [[id number]] Estimated Length of Stay: 2 Midnights Discharge Plan[de-identified] Home with Office Follow-up

## 2018-02-07 NOTE — ED PROVIDER NOTES
EMERGENCY DEPARTMENT HISTORY AND PHYSICAL EXAM    4:20 PM      Date: 2/7/2018  Patient Name: Marilynn Armstrong    History of Presenting Illness     No chief complaint on file. History Provided By: Patient    Additional History (Context): Marilynn Armstrong is a 80 y.o. female with hypertension and CHF who presents with several days of worsening moderate shortness of breath with chronic back and abdominal pain. Pt was sent over by their doctor due to worsening shortness of breath due to fluid build up in lungs, direct admit was desired but no beds were available so pt was sent to ED. Pt states she has back and abdominal pain for longer than she can remember that has gotten worse. No other concerns or symptoms at this time. PCP: Randy Jason MD    Chief Complaint: shortness of breath  Duration: several Days  Timing:  Worsening  Location: n/a  Quality: n/a  Severity: Moderate  Modifying Factors:none  Associated Symptoms: chronic back and abdominal pain      Current Facility-Administered Medications   Medication Dose Route Frequency Provider Last Rate Last Dose    furosemide (LASIX) injection 40 mg  40 mg IntraVENous NOW Alec Gifford MD         Current Outpatient Prescriptions   Medication Sig Dispense Refill    isosorbide mononitrate ER (IMDUR) 120 mg CR tablet TAKE ONE TABLET BY MOUTH ONCE DAILY 30 Tab 11    lidocaine (ASPERCREME, LIDOCAINE,) 4 % topical cream Apply  to affected area two (2) times daily as needed for Pain. Apply on skin around the pacemaker. 1 Tube 1    Ca-D3-mag ox-zinc--romina-bor (CALCIUM 600-D3 PLUS) 600 mg calcium- 800 unit-50 mg tab Take 1 Each by mouth daily.  b complex vitamins (B COMPLEX 1) tablet Take 1 Tab by mouth daily.  carvedilol (COREG) 25 mg tablet Take 1 Tab by mouth two (2) times daily (with meals). 60 Tab 6    fluticasone (FLONASE) 50 mcg/actuation nasal spray 2 Sprays by Both Nostrils route as needed.       omeprazole (PRILOSEC) 20 mg capsule Take 20 mg by mouth daily.  ferrous sulfate (IRON) 325 mg (65 mg iron) tablet Take 650 mg by mouth daily.  flaxseed oil 1,000 mg cap Take  by mouth two (2) times a day.  LACTOBACILLUS ACIDOPHILUS (PROBIOTIC PO) Take  by mouth two (2) times a day. 2 tabs bid      BUMETANIDE (BUMEX PO) Take 1 mg by mouth as directed. Take one tablet on Mon, Wed, Fri, and Sat. Take one tablet twice a day on Sun, Tue, and Thu.      potassium chloride (K-DUR) 20 mEq tablet Take 20 mEq by mouth. Take 1 tablet daily on M, W, F, Sat and take 1 tablet twice a day on T, Th, Sun      CRANBERRY EXT/C/L. SPOROGENES (CRANBERRY-PROBIOTICS-VITAMIN C PO) Take 1,500 mg by mouth two (2) times a day. 3 tablets      aspirin 81 mg tablet Take 81 mg by mouth daily.  levothyroxine (LEVOXYL) 50 mcg tablet Take 50 mcg by mouth daily (before breakfast).  nitroglycerin (NITROSTAT) 0.4 mg SL tablet 0.4 mg by SubLINGual route every five (5) minutes as needed. for chest pain         Past History     Past Medical History:  Past Medical History:   Diagnosis Date    Actinic keratoses     Angioedema 2003    and respiratory failure from reaction to lisinopril    Atypical chest pain     Cardiac cath 02/06/2009    mRCA 100% (overlapping 2.75 x 28 & 2.75 x 12 Vision BMS). LM patent. mLAD 80%. CX 25%. OM1 100%. LVEDP 28.  EF 45%. Inferobasal akn. MR 3+. Left RA 50%. RA 7.  RV 40/4. PA 37/13. W 18.  CO/CI 5.0/3.2 (TD); 4.0 x 2.6 (Dick).  Cardiac echocardiogram 01/15/2017    Severe LVE. EF 20%. Severe diffuse hypk. Indeterminate diastolic fx. RVSP 30 mmHg. Mild DORIS. Mod MR. Mod PI.  IVCE. Left pleural effusion. Similar to study of 2/9/10.  Cardiac nuclear imaging test, abnormal 09/27/2010    Mod basal inferior, early mid inferior infarction w/o ischemia. Mild mid anterior artifact vs infarction w/o ischemia. Massive LVE. EF 18%. Marked septal, inferior hypk. Inferoapical, inferoseptal dysk. Anterior hypk.   RVE suggested.  Cardiovascular ankle brachial index 03/28/2014    No arterial disease at rest bilaterally. R АНДРЕЙ 1.02.  L АНДРЕЙ 1.05.  R DBI 0.48. L DBI 0.64. Exercise deferred.  Cardiovascular RLE venous duplex 01/14/2017    Right leg:  No DVT.  Carotid duplex 03/28/2014    Mild <50% bilateral ICA plaquing.  Coronary artery disease     Most likely myocardial infarction 12-08; stenting of the RCA and LIMA to LAD with bypass surgery later that year    Dyslipidemia     GERD (gastroesophageal reflux disease)     Heart failure 6/09,2/10,11/10    readmission to hospital 6/09, 2/10, 11/10    Hypertension     Hypothyroidism     Incomplete bundle branch block     left    Intraventricular conduction delay     with QRS duration of 120 msec with incomplete left BBB    Irritable bowel syndrome     Ischemic cardiomyopathy     EF now in the 15% to 20% range, last assessed September 2010 by nuclear stress test.   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  Thrombocytopenia ([address]        Past Surgical History:  Past Surgical History:   Procedure Laterality Date    HX BLADDER REPAIR      HX CORONARY STENT PLACEMENT  2/6/09    totally occluded right coronary artery was stented successfully with 2.75 mm x 28 mm Vision stent overlapping with the 12 mm length Vision stent    HX HEART CATHETERIZATION  02/09    with stenting of the total occluded right coronary artery    HX HEART VALVE SURGERY      1. Mitral valve repair with size 30 CE Physio ring.   2. Single LIMA to the LAD    HX HEMORRHOIDECTOMY      HX HYSTERECTOMY      HX PACEMAKER  10/13/10    dual-chamber Medtronic AICD without left ventricular lead       Family History:  Family History   Problem Relation Age of Onset    Hypertension Sister        Social History:  Social History   Substance Use Topics    Smoking status: Never Smoker    Smokeless tobacco: Never Used    Alcohol use No       Allergies: Allergies   Allergen Reactions    Ace Inhibitors Swelling     wheezing    Ciprofloxacin Nausea Only     Dizziness, nausea    Codeine Nausea Only    Cozaar [Losartan] Swelling     Tongue edema and generalized swelling    Darvocet A500 [Propoxyphene N-Acetaminophen] Other (comments)     Dizziness    Gabapentin Swelling     (Neurontin)    Lisinopril Swelling     Throat swells      Magox [Magnesium Oxide] Diarrhea    Norvasc [Amlodipine] Swelling     Tongue edema and generalized swelling    Smz-Tmp Ds [Sulfamethoxazole-Trimethoprim] Swelling     Throat swells         Review of Systems     Review of Systems   Respiratory: Positive for shortness of breath. Gastrointestinal: Positive for abdominal pain. Musculoskeletal: Positive for back pain. All other systems reviewed and are negative. Physical Exam   There were no vitals taken for this visit. Physical Exam   Constitutional: She is oriented to person, place, and time. She appears well-developed. HENT:   Head: Normocephalic and atraumatic. Eyes: EOM are normal. Pupils are equal, round, and reactive to light. Neck: Normal range of motion. Neck supple. Cardiovascular: Normal rate, regular rhythm and normal heart sounds. Exam reveals no friction rub. No murmur heard. Pulmonary/Chest: Effort normal and breath sounds normal. No respiratory distress. She has no wheezes. Abdominal: Soft. She exhibits no distension. There is no tenderness. There is no rebound and no guarding. Musculoskeletal: Normal range of motion.    Neurological: She is alert and oriented to person, place, and time. Skin: Skin is warm and dry. Psychiatric: She has a normal mood and affect. Her behavior is normal. Thought content normal.         Diagnostic Study Results   MPRESSION  Impression:     Cardiomegaly. Enlarging moderate sized right pleural effusion. Persistent left  basilar consolidation similar to the prior study likely related atelectasis and  pleural fluid. No convincing evidence of heart failure at this time. EKG shows normal sinus rhythm with a rate of 95. Normal axis normal intervals no ST elevation or depression is no hypertrophy      Medical Decision Making      patient with shortness of breath. 70-year-old female with a history of effusion trying to get some fluid off. She looks like she is uncomfortable. She was sent in from the primary care office we discussed this with the respiratory medicine attending Dr. Connor He . Will admit    Diagnosis     No diagnosis found. _______________________________    Attestations:  Scribe [address] [place] [address] acting as a scribe for and in the presence of Vicky Mares MD      February 07, 2018 at 4:20 PM       Provider Attestation:      I personally performed the services described in the documentation, reviewed the documentation, as recorded by the scribe in my presence, and it accurately and completely records my words and actions.  February 07, 2018 at 4:20 PM - Vicky Mares MD    _______________________________

## 2018-02-07 NOTE — ED TRIAGE NOTES
Pt c/o increasing SOB, pt denies CP pt aao*4, pt talking full sentences, diminished lung sounds, pt was at her PCPs today and he was concerned , he sent her to the ER, BL LE swelling non weeping pt states it hurts to ambulate, bl DP palpable pt connected to cardiac machine continue to monitor pt

## 2018-02-08 NOTE — ED NOTES
TRANSFER - OUT REPORT:    Verbal report given to Caitlyn Armendariz RN (name) on Reather Pedlar  being transferred to  (unit) for routine progression of care       Report consisted of patients Situation, Background, Assessment and   Recommendations(SBAR). Information from the following report(s) SBAR, ED Summary and MAR was reviewed with the receiving nurse. Lines:   Peripheral IV 02/07/18 Left; Lower Arm (Active)   Site Assessment Clean, dry, & intact 2/7/2018  4:52 PM   Phlebitis Assessment 0 2/7/2018  4:52 PM   Infiltration Assessment 0 2/7/2018  4:52 PM   Dressing Status Clean, dry, & intact 2/7/2018  4:52 PM   Hub Color/Line Status Pink 2/7/2018  4:52 PM        Opportunity for questions and clarification was provided.       Patient transported with:   Registered Nurse

## 2018-02-08 NOTE — PALLIATIVE CARE
Palliative medicine NP Keisha Pina met with Mrs Zuhair Qiu bedside. Mrs Zuhair Qiu has capacity to make complex medical decisions for herself at this time. She is worried about Medicaid as she thinks she will \"need to go to a nursing home soon\". Scheduled time for Advance Care Planning Discussion for Monday Feb 12,2018 at 1100. She plans to have her family present.       Keisha Douglas [phone]

## 2018-02-08 NOTE — PROGRESS NOTES
Intern Progress Note  [facility]       Patient: Izabel Deshpande MRN: [id number]  CSN: [id number]    YOB: 1928  Age: 80 y.o. Sex: female    DOA: 2/7/2018 LOS:  LOS: 1 day                    Subjective:     Acute events: No acute events noted or reported. Patient still endorsing lot of chest pain that waxes and wanes and at times travels to her stomach. States the chest pain can become debilitating that she just curls up and moans at home. Also states she becomes dyspneic during the episodes. Still feeling SOB      Review of Systems   Constitutional: Negative for chills, diaphoresis and fever. Respiratory: Positive for cough and shortness of breath. Cardiovascular: Positive for chest pain and leg swelling. Negative for palpitations. Gastrointestinal: Positive for abdominal pain. Neurological: Negative for tingling and headaches.        Objective:      Patient Vitals for the past 24 hrs:   Temp Pulse Resp BP SpO2   02/08/18 0532 98.2 °F (36.8 °C) 66 20 146/74 98 %   02/08/18 0039 97.8 °F (36.6 °C) 68 (!) 32 148/63 93 %   02/07/18 2300 - 65 30 145/65 99 %   02/07/18 2245 - 67 (!) 34 143/67 97 %   02/07/18 2230 - 69 22 143/66 99 %   02/07/18 2215 - 65 28 138/71 98 %   02/07/18 2200 - 67 (!) 31 141/66 97 %   02/07/18 2145 - 67 27 140/87 97 %   02/07/18 2130 - 66 30 143/68 99 %   02/07/18 2115 - 66 30 141/67 98 %   02/07/18 2100 - 67 25 141/67 100 %   02/07/18 2045 - 70 22 135/75 99 %   02/07/18 2030 - 67 26 136/68 100 %   02/07/18 2015 - 67 23 136/77 100 %   02/07/18 2000 - 68 20 148/72 100 %   02/07/18 1945 - 65 21 141/72 100 %   02/07/18 1930 - 69 23 - 99 %   02/07/18 1915 - 68 19 151/70 98 %   02/07/18 1900 - 66 22 138/66 100 %   02/07/18 1850 98.2 °F (36.8 °C) - - - -   02/07/18 1845 - 63 19 138/62 100 %   02/07/18 1830 - 63 22 139/72 100 %   02/07/18 1815 - 67 22 138/68 99 %   02/07/18 1810 - - - - 97 %   02/07/18 1800 - 69 18 136/78 99 %   02/07/18 1745 - 69 21 143/74 99 %   02/07/18 1730 - 69 28 138/70 100 %   02/07/18 1715 - 81 (!) 32 - 96 %   02/07/18 1700 - 67 21 134/65 100 %   02/07/18 1645 - 68 23 138/66 100 %   02/07/18 1630 - 74 26 137/68 100 %   02/07/18 1615 - 70 15 132/69 100 %   02/07/18 1600 - 71 20 136/67 -       No intake or output data in the 24 hours ending 02/08/18 1667    Physical Exam   Constitutional: She is oriented to person, place, and time. She appears well-developed. She appears distressed (Mild ). Cachectic and frail appearing   HENT:   Head: Normocephalic and atraumatic. Eyes: EOM are normal. No scleral icterus. Neck: Normal range of motion. Neck supple. No tracheal deviation present. Cardiovascular: Normal rate, regular rhythm and normal heart sounds. Pulmonary/Chest: She has wheezes. She has rales. AICD in place on left. Chest wall not TTP   Abdominal: Soft. Bowel sounds are normal. She exhibits no distension. Musculoskeletal: She exhibits edema (BLE pitting edema). Neurological: She is alert and oriented to person, place, and time. Skin: Skin is warm. She is not diaphoretic.        Lab/Data Reviewed:  Recent Results (from the past 12 hour(s))   METABOLIC PANEL, BASIC    Collection Time: 02/08/18  3:44 AM   Result Value Ref Range    Sodium 142 136 - 145 mmol/L    Potassium 3.5 3.5 - 5.5 mmol/L    Chloride 100 100 - 108 mmol/L    CO2 35 (H) 21 - 32 mmol/L    Anion gap 7 3.0 - 18 mmol/L    Glucose 127 (H) 74 - 99 mg/dL    BUN 27 (H) 7.0 - 18 MG/DL    Creatinine 1.85 (H) 0.6 - 1.3 MG/DL    BUN/Creatinine ratio 15 12 - 20      GFR est AA 31 (L) >60 ml/min/1.73m2    GFR est non-AA 26 (L) >60 ml/min/1.73m2    Calcium 9.8 8.5 - 10.1 MG/DL   CBC WITH AUTOMATED DIFF    Collection Time: 02/08/18  3:44 AM   Result Value Ref Range    WBC 5.5 4.6 - 13.2 K/uL    RBC 3.39 (L) 4.20 - 5.30 M/uL    HGB 10.1 (L) 12.0 - 16.0 g/dL    HCT 33.0 (L) 35.0 - 45.0 %    MCV 97.3 (H) 74.0 - 97.0 FL    MCH 29.8 24.0 - 34.0 PG    MCHC 30.6 (L) 31.0 - 37.0 g/dL RDW 14.8 (H) 11.6 - 14.5 %    PLATELET 585 (L) 753 - 420 K/uL    MPV 11.1 9.2 - 11.8 FL    NEUTROPHILS 65 40 - 73 %    LYMPHOCYTES 18 (L) 21 - 52 %    MONOCYTES 15 (H) 3 - 10 %    EOSINOPHILS 2 0 - 5 %    BASOPHILS 0 0 - 2 %    ABS. NEUTROPHILS 3.6 1.8 - 8.0 K/UL    ABS. LYMPHOCYTES 1.0 0.9 - 3.6 K/UL    ABS. MONOCYTES 0.8 0.05 - 1.2 K/UL    ABS. EOSINOPHILS 0.1 0.0 - 0.4 K/UL    ABS.  BASOPHILS 0.0 0.0 - 0.1 K/UL    DF AUTOMATED     CARDIAC PANEL,(CK, CKMB & TROPONIN)    Collection Time: 02/08/18  3:44 AM   Result Value Ref Range    CK 45 26 - 192 U/L    CK - MB <1.0 <3.6 ng/ml    CK-MB Index  0.0 - 4.0 %     CALCULATION NOT PERFORMED WHEN RESULT IS BELOW LINEAR LIMIT    Troponin-I, Qt. 0.03 0.0 - 0.045 NG/ML   EKG, 12 LEAD, SUBSEQUENT    Collection Time: 02/08/18  7:39 AM   Result Value Ref Range    Ventricular Rate 72 BPM    Atrial Rate 72 BPM    P-R Interval 168 ms    QRS Duration 126 ms    Q-T Interval 442 ms    QTC Calculation (Bezet) 483 ms    Calculated R Axis -65 degrees    Calculated T Axis 125 degrees    Diagnosis       Normal sinus rhythm  Left axis deviation  Nonspecific intraventricular block  Cannot rule out Septal infarct (cited on or before 07-FEB-2018)  T wave abnormality, consider lateral ischemia  Abnormal ECG  When compared with ECG of 07-FEB-2018 16:36,  Serial changes of Septal infarct present           Scheduled Medications Reviewed:  Current Facility-Administered Medications   Medication Dose Route Frequency    aspirin chewable tablet 81 mg  81 mg Oral DAILY    calcium-vitamin D (OS-ARUN) 500 mg-200 unit tablet  1 Tab Oral DAILY    carvedilol (COREG) tablet 25 mg  25 mg Oral BID WITH MEALS    ferrous sulfate tablet 650 mg  650 mg Oral DAILY    hydrALAZINE (APRESOLINE) tablet 25 mg  25 mg Oral TID    levothyroxine (SYNTHROID) tablet 50 mcg  50 mcg Oral ACB    pantoprazole (PROTONIX) tablet 40 mg  40 mg Oral DAILY    potassium chloride (KLOR-CON) tablet 10 mEq  10 mEq Oral BID    atorvastatin (LIPITOR) tablet 20 mg  20 mg Oral QHS    isosorbide mononitrate ER (IMDUR) tablet 120 mg  120 mg Oral DAILY    furosemide (LASIX) injection 40 mg  40 mg IntraVENous BID         Imaging, microbiology, and EKG/Telemetry:  CXR Results  (Last 48 hours)               02/07/18 1627  XR CHEST PORT Final result    Impression:  Impression:       Cardiomegaly. Enlarging moderate sized right pleural effusion. Persistent left   basilar consolidation similar to the prior study likely related atelectasis and   pleural fluid. No convincing evidence of heart failure at this time. Narrative:  CHEST PORTABLE       CPT CODE: 96728       COMPARISON: 7/22/2017       INDICATIONS: Pleural effusion       FINDINGS: The heart is enlarged. A prosthetic mitral valve is present. A   left-sided bipolar ICD is present. Status post median sternotomy. There is an   enlarging moderate sized right pleural effusion. There is persistent left   basilar consolidation probably related atelectasis and pleural fluid. Aerated   lungs are relatively clear. Assessment/Plan     80 y.o. female with PMH chronic systolic and diastolic CHF, CKD stage IV, HTN, hypothyroidism, anemia, OA, DM- diet controlled, CAD s/p AICD, GERD, HLD, neuropathy, and IBS, now admitted with shortness of breath and worsening edema     Shortness of breath 2/2 CHF exacerbation: Pt has history of systolic and diastolic CHF. Echo (7/2017): EF 15-20% with grade 2 diastolic dysfunction. On 2L O2 at home. BNP [id number] at admission. Initial trop: 0.02>0.03  - Cardiology consult in AM. Appreciate rec  - c/w 40mg IV Lasix BID  - Hold home Bumex 1mg BID  - Continue home Coreg 25mg BID  - Daily CBC, BMP  - Trend Cardiac enzymes  - Repeat EKG in AM unchanged. No evidence of acute ischemia. Will FU w/ cardio  - Daily weights  - Fluid restrictions.  Strict I&O  - CM consult  - IS/acapella  - Fall precautions     CAD: s/p dual chamber AICD placement, stents in RCA and LIMA to LAD in 2009. Has been seeing Dr. Heather Sage as outpatient. CXR shows mitral prosthetic valve (unsure what type)  - Continue home Imdur 120mg daily  - Continue ASA 81mg daily  - Continue home NG PRN  - Monitor on tele     CKD stage IV: Followed by Dr. Car Hernandez as outpatient. Baseline Cr 1.4 - 1.9. On admit it was 1.87  - Avoid nephrotoxic drugs  - Renally dose medications     HTN:   - Continue hydralazine 25mg TID  - Monitor VS     Hypothyroidism: TSH (6/2017): 3.75  - Continue home Synthroid 50mcg daily     HLD: Lipid panel (12/2016): TC- 183, triglycerides- 50, LDL-106, HDL- 67  - Continue home atorvastatin 20mg daily     GERD:   - Protonix 40mg daily as substitution for home omeprazole     DM2: diet controlled, A1c (9/2015): 5.9  - Monitor clinically     Diet: Cardiac, FR 1L  DVT Prophylaxis: SCDs  Code Status: DNR  Point of Contact: Ina Anguiano                  Son                [phone]  TCKWAYF \"KYB\" Gio Víctor                 Granddaughter                      [phone]     Disposition and anticipated LOS: >2 Juanice Noun In LORNA Dangelo  PGY-1 [facility]  02/08/18 7:18 AM

## 2018-02-08 NOTE — PROGRESS NOTES
Care Management Interventions  Current Support Network: Lives with Spouse, Family Lives Nearby  Confirm Follow Up Transport: Family  Plan discussed with Pt/Family/Caregiver: Yes      80 yr old female admit with CHF exacerbation. Patient states that she lives with her spouse at home address verified. Patient states that she has a rollator/cane/shower chair/home 02 with [employer] and HHC with [facility]. Patient stated that she is able to do her own ADL's but struggles. Palliative Care consulted. Patient at this time wants to go home for discharge disposition, will remain available.

## 2018-02-08 NOTE — PROGRESS NOTES
conducted an Initial consultation and Spiritual Assessment for Kris Villegas, who is a 80 y.o.,female. Patients Primary Language is: [place]. According to the patients EMR Synagogue Affiliation is: Christian. The reason the Patient came to the hospital is:   Patient Active Problem List    Diagnosis Date Noted    Pleural effusion 02/07/2018    [facility] 02/07/2018    Chest pain 07/22/2017    Hypoxia 07/22/2017    Mitral regurgitation, s/p MV repair # 30 Physio Ring 03/28/2017    CHF (congestive heart failure) ([facility] 01/14/2017    Respiratory distress 01/13/2017    Palpitation 11/28/2016    Vertigo 11/20/2012    Angina of effort ([facility] 10/27/2011    Biventricular ICD (implantable cardiac defibrillator) in place,no LV lead 09/13/2011    Ischemic cardiomyopathy, CABG X1 LIMA to the LAD 2009/EF 25%     [facility] ([facility]     Two vessel coronary artery [id number][104.66]     Systolic CHF, acute on chronic (HCC)     Pulmonary hypertension     Hypertension     Angioedema     Mitral regurgitation, s/p MV repair # 30 Physio Ring     Incomplete bundle branch block     Intraventricular conduction delay     Dyspnea     Leg edema         The  provided the following Interventions:  Initiated a relationship of care and support. Patient is hard of hearing. Explored issues of laz, belief, and spirituality. Patient confirmed her Baptist affiliation as Christian and went to [facility] until she was no longer able to. Listened empathically to patient's narrative. She shared about her life and those of her two sons. Offered assurance of continued prayer on patient's behalf. Chart reviewed. The following outcomes were achieved:  Patient shared limited information about her medical narrative and spiritual journey. Patient processed feeling about current hospitalization. She stated she is ready to go \"home. \"  Patient expressed gratitude for the 's visit. Patient's granddaughter came into the room towards the end of the visit. Assessment:  Patient does not have any Lutheran or cultural needs that will affect patients preferences in health care. Patient did not indicate any other spiritual or Lutheran issues which require Spiritual Care Services interventions at this time. Plan:  Chaplains will continue to follow and will provide pastoral care as needed or requested. The Rev.  [address] [facility] [place], [address]  [facility] [phone] / [facility] [phone]

## 2018-02-08 NOTE — H&P
Admission History and Physical  [facility]      Patient: Mosie Boeck MRN: [id number]  CSN: [id number]    YOB: 1928  Age: 80 y.o. Sex: female      DOA: 2/7/2018       HPI:     Mosie Boeck is a 80 y.o. female with PMH chronic systolic and diastolic CHF, CKD stage IV, HTN, hypothyroidism, anemia, OA, DM- diet controlled, CAD s/p AICD, GERD, HLD, neuropathy, and IBS, now presenting with complaint of worsening shortness of breath. Pt reports worsening shortness of breath and leg edema over past few weeks. She is on home O2 and has been experiencing shortness of breath with even minimal exertion on oxygen. She has been unable to care for herself or her , who is also ailing. She is being seen by Dr. Alfredo Montana, cardiology, and Dr. Kiki Mercer, nephrology. She was admitted to [facility] in July 2017 for a similar presentation and was treated with oxygen, morphine and was evaluated for cardiac cath. Pt did not receive cath during last admission d/t declining necessary HD before cath for poor renal function. ED Course:  CBC: unremarkable  CMP: Cr: 1.87  BNP: 97016  PT: 15.5  INR: 1.3  EKG: Normal sinus rhythm, Left axis deviation, Septal infarct , age undetermined, ST & T wave abnormality, consider lateral ischemia  CXR: Cardiomegaly. Enlarging moderate sized right pleural effusion. Persistent left basilar consolidation similar to the prior study likely related atelectasis and pleural fluid. No convincing evidence of heart failure at this time.   Lasix 40mg IV administered    Review of Systems  General ROS: negative for  - fever, night sweats, weight gain and weight loss  Ophthalmic ROS: negative for - blurry vision, decreased vision or loss of vision  ENT ROS: negative for - headaches, hearing change or visual changes  Hematological and Lymphatic ROS: negative for - bruising, jaundice  Respiratory ROS: negative for - wheezing, shortness of breath and cough Cardiovascular ROS: negative for -  loss of consciousness or palpitations; chest pain, dyspnea on exertion, edema,  Gastrointestinal ROS: negative for - abdominal pain, blood in stools, change in stools, constipation, diarrhea, hematemesis, melena, nausea/vomiting or swallowing difficulty/pain  Genito-Urinary ROS: negative for - dysuria, hematuria or urinary frequency/urgency  Musculoskeletal ROS: negative for - joint pain, joint swelling or muscle pain  Neurological ROS: negative for - dizziness, headaches, numbness/tingling or weakness  Dermatological ROS: negative for - rash or skin lesion changes      Past Medical History:   Diagnosis Date    Actinic keratoses     Angioedema 2003    and respiratory failure from reaction to lisinopril    Atypical chest pain     Cardiac cath 02/06/2009    mRCA 100% (overlapping 2.75 x 28 & 2.75 x 12 Vision BMS). LM patent. mLAD 80%. CX 25%. OM1 100%. LVEDP 28.  EF 45%. Inferobasal akn. MR 3+. Left RA 50%. RA 7.  RV 40/4. PA 37/13. W 18.  CO/CI 5.0/3.2 (TD); 4.0 x 2.6 (Dick).  Cardiac echocardiogram 01/15/2017    Severe LVE. EF 20%. Severe diffuse hypk. Indeterminate diastolic fx. RVSP 30 mmHg. Mild DORIS. Mod MR. Mod PI.  IVCE. Left pleural effusion. Similar to study of 2/9/10.  Cardiac nuclear imaging test, abnormal 09/27/2010    Mod basal inferior, early mid inferior infarction w/o ischemia. Mild mid anterior artifact vs infarction w/o ischemia. Massive LVE. EF 18%. Marked septal, inferior hypk. Inferoapical, inferoseptal dysk. Anterior hypk. RVE suggested.  Cardiovascular ankle brachial index 03/28/2014    No arterial disease at rest bilaterally. R АНДРЕЙ 1.02.  L АНДРЕЙ 1.05.  R DBI 0.48. L DBI 0.64. Exercise deferred.  Cardiovascular RLE venous duplex 01/14/2017    Right leg:  No DVT.  Carotid duplex 03/28/2014    Mild <50% bilateral ICA plaquing.       Coronary artery disease     Most likely myocardial infarction 12-08; stenting of the RCA and LIMA to LAD with bypass surgery later that year    Dyslipidemia     GERD (gastroesophageal reflux disease)     Heart failure 6/09,2/10,11/10    readmission to hospital 6/09, 2/10, 11/10    Hypertension     Hypothyroidism     Incomplete bundle branch block     left    Intraventricular conduction delay     with QRS duration of 120 msec with incomplete left BBB    Irritable bowel syndrome     Ischemic cardiomyopathy     EF now in the 15% to 20% range, last assessed September 2010 by nuclear stress test.    Mitral regurgitation     moderately severe, s/p mitral valve repair in 05/2009    Osteoarthritis of knee     Pacemaker 10/10    dual-chamber ICD placed, with LV lead placement with current RV pacing worsening mitral regurgitation    Pulmonary hypertension     moderate    Renal artery stenosis (HCC)     Respiratory failure ([address] 2003    Rheumatoid [id number](029.1)     Systolic CHF, chronic (HCC)     class III to IV, with multiple recurrent admissions in the past year.  Thrombocytopenia ([address]        Past Surgical History:   Procedure Laterality Date    HX BLADDER REPAIR      HX CORONARY STENT PLACEMENT  2/6/09    totally occluded right coronary artery was stented successfully with 2.75 mm x 28 mm Vision stent overlapping with the 12 mm length Vision stent    HX HEART CATHETERIZATION  02/09    with stenting of the total occluded right coronary artery    HX HEART VALVE SURGERY      1. Mitral valve repair with size 30 CE Physio ring.   2. Single LIMA to the LAD    HX HEMORRHOIDECTOMY      HX HYSTERECTOMY      HX PACEMAKER  10/13/10    dual-chamber Medtronic AICD without left ventricular lead       Family History   Problem Relation Age of Onset    Hypertension Sister        Social History     Social History    Marital status:      Spouse name: N/A    Number of children: N/A    Years of education: N/A     Social History Main Topics    Smoking status: Never Smoker    Smokeless tobacco: Never Used    Alcohol use No    Drug use: No    Sexual activity: Not Asked     Other Topics Concern    None     Social History Narrative       Allergies   Allergen Reactions    Ace Inhibitors Swelling     wheezing    Ciprofloxacin Nausea Only     Dizziness, nausea    Codeine Nausea Only    Cozaar [Losartan] Swelling     Tongue edema and generalized swelling    Darvocet A500 [Propoxyphene N-Acetaminophen] Other (comments)     Dizziness    Gabapentin Swelling     (Neurontin)    Lisinopril Swelling     Throat swells      Magox [Magnesium Oxide] Diarrhea    Norvasc [Amlodipine] Swelling     Tongue edema and generalized swelling    Smz-Tmp Ds [Sulfamethoxazole-Trimethoprim] Swelling     Throat swells       Prior to Admission Medications   Prescriptions Last Dose Informant Patient Reported? Taking? BUMETANIDE (BUMEX PO)   Yes No   Sig: Take 1 mg by mouth as directed. Take one tablet on Mon, Wed, Fri, and Sat. Take one tablet twice a day on Sun, e, and Thu.   CRANBERRY EXT/C/L. SPOROGENES (CRANBERRY-PROBIOTICS-VITAMIN C PO)   Yes No   Sig: Take 1,500 mg by mouth two (2) times a day. 3 tablets   Ca-D3-mag ox-zinc--romina-bor (CALCIUM 600-D3 PLUS) 600 mg calcium- 800 unit-50 mg tab   Yes No   Sig: Take 1 Each by mouth daily. LACTOBACILLUS ACIDOPHILUS (PROBIOTIC PO)   Yes No   Sig: Take  by mouth two (2) times a day. 2 tabs bid   aspirin 81 mg tablet   Yes No   Sig: Take 81 mg by mouth daily. b complex vitamins (B COMPLEX 1) tablet   Yes No   Sig: Take 1 Tab by mouth daily. carvedilol (COREG) 25 mg tablet   No No   Sig: Take 1 Tab by mouth two (2) times daily (with meals). ferrous sulfate (IRON) 325 mg (65 mg iron) tablet   Yes No   Sig: Take 650 mg by mouth daily. flaxseed oil 1,000 mg cap   Yes No   Sig: Take  by mouth two (2) times a day. fluticasone (FLONASE) 50 mcg/actuation nasal spray   Yes No   Si Sprays by Both Nostrils route as needed.    isosorbide mononitrate ER (IMDUR) 120 mg CR tablet   No No   Sig: TAKE ONE TABLET BY MOUTH ONCE DAILY   levothyroxine (LEVOXYL) 50 mcg tablet   Yes No   Sig: Take 50 mcg by mouth daily (before breakfast). lidocaine (ASPERCREME, LIDOCAINE,) 4 % topical cream   No No   Sig: Apply  to affected area two (2) times daily as needed for Pain. Apply on skin around the pacemaker. nitroglycerin (NITROSTAT) 0.4 mg SL tablet   Yes No   Si.4 mg by SubLINGual route every five (5) minutes as needed. for chest pain   omeprazole (PRILOSEC) 20 mg capsule   Yes No   Sig: Take 20 mg by mouth daily. potassium chloride (K-DUR) 20 mEq tablet   Yes No   Sig: Take 20 mEq by mouth. Take 1 tablet daily on M, W, F, Sat and take 1 tablet twice a day on T, Th, Sun      Facility-Administered Medications: None       Physical Exam:     No data found. Physical Exam:   General:  Alert and Responsive and in No acute distress. HEENT: Conjunctiva pink, sclera anicteric. EOMI. Pharynx moist, nonerythematous. Moist mucous membranes. Thyroid not enlarged, no nodules. No cervical, supraclavicular, occipital or submandibular lymphadenopathy. Marked JVD  CV:  RRR. No murmurs, rubs, or gallops appreciated. Visible pulsations. RESP:  Unlabored breathing. Bibasilar rales. Equal expansion bilaterally. ABD:  Soft, diffusely tender, distended. Normoactive bowel sounds. No hepatosplenomegaly. No suprapubic tenderness. MS:  No joint deformity or instability. No atrophy. Neuro:  5/5 strength bilateral upper extremities and lower extremities. A+Ox3. Strength and sensation equal bilaterally, CN II-XII grossly intact  Ext: 2+ pitting edema with tenderness bilaterally. 2+ radial and dp pulses bilaterally. Skin:  No rashes, lesions, or ulcers. Good turgor. IMAGING:   EKG: Normal sinus rhythm, Left axis deviation, Septal infarct , age undetermined, ST & T wave abnormality, consider lateral ischemia  CXR: Cardiomegaly.  Enlarging moderate sized right pleural effusion. Persistent left basilar consolidation similar to the prior study likely related atelectasis and pleural fluid. No convincing evidence of heart failure at this time. Recent Results (from the past 12 hour(s))   EKG, 12 LEAD, INITIAL    Collection Time: 02/07/18  4:36 PM   Result Value Ref Range    Ventricular Rate 69 BPM    Atrial Rate 69 BPM    P-R Interval 188 ms    QRS Duration 124 ms    Q-T Interval 442 ms    QTC Calculation (Bezet) 473 ms    Calculated P Axis 63 degrees    Calculated R Axis -58 degrees    Calculated T Axis 138 degrees    Diagnosis       Normal sinus rhythm  Left axis deviation  Septal infarct , age undetermined  ST & T wave abnormality, consider lateral ischemia  Abnormal ECG  When compared with ECG of 22-JUL-2017 11:05,  QRS axis shifted left  Septal infarct is now present     CBC WITH AUTOMATED DIFF    Collection Time: 02/07/18  4:49 PM   Result Value Ref Range    WBC 4.6 4.6 - 13.2 K/uL    RBC 3.42 (L) 4.20 - 5.30 M/uL    HGB 10.4 (L) 12.0 - 16.0 g/dL    HCT 33.3 (L) 35.0 - 45.0 %    MCV 97.4 (H) 74.0 - 97.0 FL    MCH 30.4 24.0 - 34.0 PG    MCHC 31.2 31.0 - 37.0 g/dL    RDW 14.6 (H) 11.6 - 14.5 %    PLATELET 253 (L) 536 - 420 K/uL    MPV 11.1 9.2 - 11.8 FL    NEUTROPHILS 65 40 - 73 %    LYMPHOCYTES 18 (L) 21 - 52 %    MONOCYTES 15 (H) 3 - 10 %    EOSINOPHILS 2 0 - 5 %    BASOPHILS 0 0 - 2 %    ABS. NEUTROPHILS 3.0 1.8 - 8.0 K/UL    ABS. LYMPHOCYTES 0.8 (L) 0.9 - 3.6 K/UL    ABS. MONOCYTES 0.7 0.05 - 1.2 K/UL    ABS. EOSINOPHILS 0.1 0.0 - 0.4 K/UL    ABS.  BASOPHILS 0.0 0.0 - 0.1 K/UL    DF AUTOMATED     METABOLIC PANEL, COMPREHENSIVE    Collection Time: 02/07/18  4:49 PM   Result Value Ref Range    Sodium 142 136 - 145 mmol/L    Potassium 3.9 3.5 - 5.5 mmol/L    Chloride 101 100 - 108 mmol/L    CO2 37 (H) 21 - 32 mmol/L    Anion gap 4 3.0 - 18 mmol/L    Glucose 135 (H) 74 - 99 mg/dL    BUN 28 (H) 7.0 - 18 MG/DL    Creatinine 1.87 (H) 0.6 - 1.3 MG/DL    BUN/Creatinine ratio 15 12 - 20      GFR est AA 31 (L) >60 ml/min/1.73m2    GFR est non-AA 25 (L) >60 ml/min/1.73m2    Calcium 9.9 8.5 - 10.1 MG/DL    Bilirubin, total 0.5 0.2 - 1.0 MG/DL    ALT (SGPT) 28 13 - 56 U/L    AST (SGOT) 37 15 - 37 U/L    Alk. phosphatase 20 (L) 45 - 117 U/L    Protein, total 6.7 6.4 - 8.2 g/dL    Albumin 3.5 3.4 - 5.0 g/dL    Globulin 3.2 2.0 - 4.0 g/dL    A-G Ratio 1.1 0.8 - 1.7     NT-PRO BNP    Collection Time: 02/07/18  4:49 PM   Result Value Ref Range    NT pro-BNP 49252 (H) 0 - 1800 PG/ML   PROTHROMBIN TIME + INR    Collection Time: 02/07/18  4:49 PM   Result Value Ref Range    Prothrombin time 15.5 (H) 11.5 - 15.2 sec    INR 1.3 (H) 0.8 - 1.2           Assessment/Plan:   80 y.o. female with PMH chronic systolic and diastolic CHF, CKD stage IV, HTN, hypothyroidism, anemia, OA, DM- diet controlled, CAD s/p AICD, GERD, HLD, neuropathy, and IBS, now admitted with shortness of breath. Shortness of breath 2/2 CHF exacerbation: Pt has history of systolic and diastolic CHF. Echo (7/2017): EF 15-20% with grade 2 diastolic dysfunction. On 2L O2 at home. BNP [id number] at admission. Initial trop: 0.02  - Admitted to tele  - Cardiology consult in AM  - Received 40mg IV Lasix in ED, will continue   - Hold home Bumex 1mg BID  - Continue home Coreg 25mg BID  - Daily CBC, BMP  - Cardiac enzymes x3  - Repeat EKG in AM  - Monitor VS  - Daily weights  - Fluid restrictions  - Strict I&O  - CM consult  - IS/acapella  - Fall precautions    CAD: s/p dual chamber AICD placement, stents in RCA and LIMA to LAD in 2009. Has been seeing Dr. Nacho Samaniego as outpatient. - Continue home Imdur 120mg daily  - Continue ASA 81mg daily  - Continue home NG PRN  - Monitor on tele    CKD stage IV: Followed by Dr. Beth Mccabe as outpatient.    - Avoid nephrotoxic drugs  - Renally dose medications  - Nephro consult in AM    HTN:   - Continue hydralazine 25mg TID  - Monitor VS    Hypothyroidism: TSH (6/2017): 3.75  - Continue home Synthroid 50mcg daily    HLD: Lipid panel (12/2016): TC- 183, triglycerides- 50, LDL-106, HDL- 67  - Continue home atorvastatin 20mg daily    GERD:   - Protonix 40mg daily as substitution for home omeprazole    DM2: diet controlled, A1c (9/2015): 5.9  - Monitor clinically    Diet: Cardiac, FR 1L  DVT Prophylaxis: SCDs  Code Status: DNR  Point of Contact: Angel Luis Qiu Son [phone]  FJDLXEM \"AWQ\" Melissa Powell [phone]    Disposition and anticipated LOS: >2 midnights    Laureen Ma MD, PGY-1  [facility]  02/07/18 7:02 PM                Senior Resident History and Physical  [facility]     HPI:      Jhonatan Hinojosa is a 80 y.o. female with a PMH of chronic systolic and diastolic CHF, SKD Stage IV, HTN, hypothyroidism, anemia, and arthritis now admitted with complaint of shortness of breath.     She was seen in the PFM clinic today by PCP Dr. Shadi Castrejon. He attempted direct admission, however bed availability was an issue. He referred her to the ER for further evaluation and management. In reading Dr. Mireille Velázquez note, he desires to have the team consult her cardiology and nephrology groups.       HPI, ROS, PMH, PSH, Family Hx, Social Hx, home medications, and allergies as above in intern H&P. I agree with history as above.      Physical Exam:   There were no vitals taken for this visit.     General:  WD, WN, NAD  HEENT:  NCAT. Conjunctiva pink, sclera anicteric. PERRL. EOMI. Pharynx moist, nonerythematous. Moist mucous membranes. Thyroid not enlarged, no nodules. No cervical, supraclavicular, or submandibular lymphadenopathy. CV:  RRR, no mumurs. PMI not displaced. RESP:  Tachypneic. Bibasilar rhales present. No wheezing. ABD:  Soft, nontender, nondistended. Normoactive bowel sounds. No hepatosplenomegaly. No suprapubic tenderness. No CVA tenderness. MS:  No joint deformity or instability. No atrophy.   Neuro:  5/5 strength bilateral upper extremities and lower extremities. CN II-XII intact. Sensation grossly intact. A+Ox3. Ext:  2+ pitting edema to the level of the knees bilaterally. Calves were equal in circumference. 2+ radial and dp pulses bilaterally. Skin:  No rashes, lesions, or ulcers. Good turgor.     Labs and imaging reviewed     Assessment/Plan:   Jhonatan Hinojosa is a 80 y.o. female with a PMH of chronic systolic and diastolic CHF, SKD Stage IV, HTN, hypothyroidism, anemia, and arthritis now admitted with complaint of shortness of breath.     CHF Exacerbation:  Combined systolic and diastolic CHF with last EF 15-20% in July 2017. Now with peripheral edema, pleural effusion on CXR, shortness of breath and BNP> 80,000. She reports compliance with her medication regimen as listed in outpatient records. - Continue home asa, carvedilol, imdur.  - Will consult cardiology in the morning.   - Will hold home bumex for now. Will diurese with Lasix 40mg BID. Will watch kidney functioning closely.         *Please see intern note above for additional chronic disease mgmt.     Christopher D. Doyne Goltz, MD  2/7/2018, 9:16 PM

## 2018-02-08 NOTE — ROUTINE PROCESS
TRANSFER - IN REPORT:    Verbal report received from IraRN(name) on [facility]  being received from ER(unit) for routine progression of care      Report consisted of patients Situation, Background, Assessment and   Recommendations(SBAR). Information from the following report(s) SBAR, Kardex, ED Summary, Intake/Output, MAR, Recent Results and Cardiac Rhythm SR was reviewed with the receiving nurse. Opportunity for questions and clarification was provided. Assessment completed upon patients arrival to unit and care assumed. Bedside and Verbal shift change report given to *** (oncoming nurse) by me (offgoing nurse).  Report included the following information SBAR, Kardex, ED Summary, Intake/Output, MAR, Recent Results and Cardiac Rhythm SR.

## 2018-02-09 NOTE — PROGRESS NOTES
Intern Progress Note  [facility]       Patient: Ashlie Smith MRN: [id number]  CSN: [id number]    YOB: 1928  Age: 80 y.o. Sex: female    DOA: 2/7/2018 LOS:  LOS: 2 days                    Subjective:     Acute events: No acute events overnight. Complaining of abd discomfort mostly. She believes it is gas. States GI cocktail did not help too much and said she has taken GasX with alleviation in the past. Says she has been using the bedside commode without issues but has been feeling tired which has been an ongoing issues for her. Feels her leg swelling has improved. Review of Systems   Constitutional: Negative for chills and fever. Respiratory: Negative for cough and shortness of breath. Cardiovascular: Positive for leg swelling. Negative for chest pain. Gastrointestinal: Positive for abdominal pain. Neurological: Positive for weakness. Negative for headaches. Objective:      Patient Vitals for the past 24 hrs:   Temp Pulse Resp BP SpO2   02/09/18 0355 98.2 °F (36.8 °C) (!) 59 20 133/70 99 %   02/09/18 0001 97.9 °F (36.6 °C) 65 20 133/64 99 %   02/08/18 1944 97.8 °F (36.6 °C) 61 20 117/63 98 %         Intake/Output Summary (Last 24 hours) at 02/09/18 0727  Last data filed at 02/09/18 0455   Gross per 24 hour   Intake                0 ml   Output              550 ml   Net             -550 ml       Physical Exam   Constitutional: She is oriented to person, place, and time. She appears well-developed. No distress. Frail and cachectic   HENT:   Head: Normocephalic and atraumatic. Eyes: EOM are normal.   Neck: Normal range of motion. Cardiovascular: Normal rate, regular rhythm and normal heart sounds. Pulmonary/Chest: Effort normal and breath sounds normal. No respiratory distress. Abdominal: Soft. Bowel sounds are normal. She exhibits no distension. Musculoskeletal: Normal range of motion. She exhibits edema.    Neurological: She is alert and oriented to person, place, and time. Skin: Skin is warm. She is not diaphoretic. Psychiatric: She has a normal mood and affect. Lab/Data Reviewed:  Recent Results (from the past 12 hour(s))   METABOLIC PANEL, BASIC    Collection Time: 02/09/18  4:13 AM   Result Value Ref Range    Sodium 143 136 - 145 mmol/L    Potassium 3.6 3.5 - 5.5 mmol/L    Chloride 102 100 - 108 mmol/L    CO2 37 (H) 21 - 32 mmol/L    Anion gap 4 3.0 - 18 mmol/L    Glucose 120 (H) 74 - 99 mg/dL    BUN 28 (H) 7.0 - 18 MG/DL    Creatinine 2.04 (H) 0.6 - 1.3 MG/DL    BUN/Creatinine ratio 14 12 - 20      GFR est AA 28 (L) >60 ml/min/1.73m2    GFR est non-AA 23 (L) >60 ml/min/1.73m2    Calcium 9.1 8.5 - 10.1 MG/DL   CBC WITH AUTOMATED DIFF    Collection Time: 02/09/18  4:13 AM   Result Value Ref Range    WBC 4.5 (L) 4.6 - 13.2 K/uL    RBC 2.99 (L) 4.20 - 5.30 M/uL    HGB 9.0 (L) 12.0 - 16.0 g/dL    HCT 29.3 (L) 35.0 - 45.0 %    MCV 98.0 (H) 74.0 - 97.0 FL    MCH 30.1 24.0 - 34.0 PG    MCHC 30.7 (L) 31.0 - 37.0 g/dL    RDW 15.4 (H) 11.6 - 14.5 %    PLATELET 380 (L) 397 - 420 K/uL    MPV 10.9 9.2 - 11.8 FL    NEUTROPHILS 65 40 - 73 %    LYMPHOCYTES 18 (L) 21 - 52 %    MONOCYTES 16 (H) 3 - 10 %    EOSINOPHILS 1 0 - 5 %    BASOPHILS 0 0 - 2 %    ABS. NEUTROPHILS 3.0 1.8 - 8.0 K/UL    ABS. LYMPHOCYTES 0.8 (L) 0.9 - 3.6 K/UL    ABS. MONOCYTES 0.7 0.05 - 1.2 K/UL    ABS. EOSINOPHILS 0.1 0.0 - 0.4 K/UL    ABS.  BASOPHILS 0.0 0.0 - 0.1 K/UL    DF AUTOMATED           Scheduled Medications Reviewed:  Current Facility-Administered Medications   Medication Dose Route Frequency    aspirin chewable tablet 81 mg  81 mg Oral DAILY    calcium-vitamin D (OS-ARUN) 500 mg-200 unit tablet  1 Tab Oral DAILY    carvedilol (COREG) tablet 25 mg  25 mg Oral BID WITH MEALS    ferrous sulfate tablet 650 mg  650 mg Oral DAILY    hydrALAZINE (APRESOLINE) tablet 25 mg  25 mg Oral TID    levothyroxine (SYNTHROID) tablet 50 mcg  50 mcg Oral ACB    pantoprazole (PROTONIX) tablet 40 mg  40 mg Oral DAILY    potassium chloride (KLOR-CON) tablet 10 mEq  10 mEq Oral BID    atorvastatin (LIPITOR) tablet 20 mg  20 mg Oral QHS    isosorbide mononitrate ER (IMDUR) tablet 120 mg  120 mg Oral DAILY    furosemide (LASIX) injection 40 mg  40 mg IntraVENous BID         Imaging, microbiology, and EKG/Telemetry:  None in 24hrs    Assessment/Plan     80 y.o. female with PMH chronic systolic and diastolic CHF, CKD stage IV, HTN, hypothyroidism, anemia, OA, DM- diet controlled, CAD s/p AICD, GERD, HLD, neuropathy, and IBS, admitted with shortness of breath and worsening edema      Shortness of breath 2/2 CHF exacerbation: Pt has history of systolic and diastolic CHF. Echo (7/2017): EF 15-20% with grade 2 diastolic dysfunction. On 2L O2 at home. BNP [id number] at admission. Initial trop: 0.02>0.03>0.03  - Cardiology rec appreciated: Diuresis  - c/w 40mg IV Lasix BID  - Hold home Bumex 1mg BID  - Continue home Coreg 25mg BID  - Daily CBC, BMP  - Daily weights  - Fluid restrictions. Strict I&O  - CM consult  - IS/acapella  - Fall precautions      CAD: s/p dual chamber AICD placement, stents in RCA and LIMA to LAD in 2009. Has been seeing Dr. Bobo Moctezuma as outpatient. CXR shows mitral prosthetic valve (unsure what type)  - Continue home Imdur 120mg daily  - Continue ASA 81mg daily  - Continue home NG PRN  - Monitor on tele      CKD stage IV: Followed by Dr. Karthik Patel as outpatient. Baseline Cr 1.4 - 1.9. On admit it was 1.87. Currently 2, likely effect of increased diuretic  - Avoid nephrotoxic drugs  - Renally dose medications      HTN:   - Continue hydralazine 25mg TID  - Monitor VS      Hypothyroidism: TSH (6/2017): 3.75  - Continue home Synthroid 50mcg daily      HLD: Lipid panel (12/2016): TC- 183, triglycerides- 50, LDL-106, HDL- 67  - Continue home atorvastatin 20mg daily      GERD:   - Protonix 40mg daily as substitution for home omeprazole  - Gas X PRN      DM2: diet controlled, A1c (9/2015): 5.9.  Currently stable <140  - Monitor clinically      Diet: Cardiac, FR 1L  DVT Prophylaxis: SCDs  Code Status: DNR  Point of Contact: Lorenzo Hopkins \"Ree\" Vaibhav                 Granddaughter                      [phone]      Disposition and anticipated LOS: >2 midnights    Luis Ruff  PGY-1 [facility]  02/09/18 7:27 AM

## 2018-02-09 NOTE — PROGRESS NOTES
[facility]  -  Progress Note      Patient: Sammy Coughlin MRN: [id number]  SSN: [id number]    YOB: 1928  Age: 80 y.o. Sex: female      Admit Date: 2/7/2018    Hospital Problem List:     Hospital Problems  Date Reviewed: 10/10/2017          Codes Class Noted POA    Pleural effusion ICD-10-CM: J90  ICD-9-CM: 511.9  2/7/2018 Unknown        * (Principal)CHF exacerbation ([address] ICD-10-CM: I50.9  ICD-9-CM: 428.0  2/7/2018 Unknown            -Acute on chronic CHF exacerbation, with mixed systolic and diastolic dysfunction, BNP 80k, + LE edema and orthopnea, EF 20% by echo 7/2017, G2DD, moderate mitral regurgitation and small circumferential pericardial effusion. Class III-IV symptomology with multiple hospital admissions in the past year or so. -Acute on chronic sob, uses 2L NC O2 at home continuously  -Ischemic cardiomyopathy, s/p Medtronic dual chamber ICD placement 2010, unable to place LV lead  -CAD hx, MI 2008, PCI to RCA in 2009, CABG x 1 (LIMA - LAD)   -Stable chronic angina, on Imdur 120mg daily, unable to afford Ranexa  -Incomplete LBBB  -HTN  -Moderate pulmonary HTN  -Hypothyroidism  -HLD  -GERD  -Renal artery stenosis  -CKD  -Advanced age, still lives independently at home with   -DNR     Primary cardiologist, Dr Coty Bradley:     -Resume PO diuretic bumex. DCing IV lasix. Symptomatically much improved. Diuresed well.   -Palliative meeting planned for Monday with family to address long-term goals.  -Continue coreg, hydralazine and imdur.   -Sent message to our office to schedule follow up in 4-5 weeks. Subjective:     Feeling much better. On NC O2. Still short of breath on exertion but more comfortable at rest. LE swelling has improved. No chest pain.     Objective:      Patient Vitals for the past 8 hrs:   Temp Pulse Resp BP SpO2   02/09/18 1148 97 °F (36.1 °C) (!) 59 20 114/67 96 %   02/09/18 0844 97.3 °F (36.3 °C) 65 18 137/78 98 % Patient Vitals for the past 96 hrs:   Weight   02/09/18 0355 59.1 kg (130 lb 4.8 oz)   02/08/18 0427 59 kg (130 lb 1.6 oz)   02/07/18 2203 55.8 kg (123 lb)         Intake/Output Summary (Last 24 hours) at 02/09/18 1341  Last data filed at 02/09/18 1148   Gross per 24 hour   Intake              360 ml   Output             1950 ml   Net            -1590 ml       Physical Exam:  General:  Awake, alert, oriented x 3, up in recliner, grand-daughter at bedside  Neck:  Supple, no jvd  Lungs:  Clear to auscultation bilat  Heart:  Reg rate and rhythm  Abdomen:  Soft, non-tender  Extremities: trace ankle edema, atraumatic, no cyanosis    Data Review:     Labs: Results:       Chemistry Recent Labs      02/09/18   0413  02/08/18   0344  02/07/18   1649   GLU  120*  127*  135*   NA  143  142  142   K  3.6  3.5  3.9   CL  102  100  101   CO2  37*  35*  37*   BUN  28*  27*  28*   CREA  2.04*  1.85*  1.87*   CA  9.1  9.8  9.9   AGAP  4  7  4   BUCR  14  15  15   AP   --    --   20*   TP   --    --   6.7   ALB   --    --   3.5   GLOB   --    --   3.2   AGRAT   --    --   1.1      CBC w/Diff Recent Labs      02/09/18   0413  02/08/18   0344  02/07/18   1649   WBC  4.5*  5.5  4.6   RBC  2.99*  3.39*  3.42*   HGB  9.0*  10.1*  10.4*   HCT  29.3*  33.0*  33.3*   PLT  105*  113*  109*   GRANS  65  65  65   LYMPH  18*  18*  18*   EOS  1  2  2      Cardiac Enzymes No results found for: CPK, CK, CKMMB, CKMB, RCK3, CKMBT, CKNDX, CKND1, RHONDA, TROPT, TROIQ, LAMBERT, TROPT, TNIPOC, BNP, BNPP   Coagulation Recent Labs      02/07/18   1649   PTP  15.5*   INR  1.3*       Lipid Panel Lab Results   Component Value Date/Time    Cholesterol, total 183 12/07/2016 04:31 PM    HDL Cholesterol 67 (H) 12/07/2016 04:31 PM    LDL,Direct 66 07/29/2010 12:18 PM    LDL, calculated 106 (H) 12/07/2016 04:31 PM    VLDL, calculated 10 12/07/2016 04:31 PM    Triglyceride 50 12/07/2016 04:31 PM    CHOL/HDL Ratio 2.7 12/07/2016 04:31 PM      BNP No results found for: BNP, BNPP, XBNPT   Liver Enzymes Recent Labs      02/07/18   1649   TP  6.7   ALB  3.5   AP  20*   SGOT  37      Digoxin    Thyroid Studies Lab Results   Component Value Date/Time    TSH 2.53 01/13/2017 11:35 AM            Signed By: ANNALISA Edwards     February 9, 2018

## 2018-02-09 NOTE — ROUTINE PROCESS
Bedside and Verbal shift change report given by Jennifer Garcia RN (offgoing nurse). Report included the following information SBAR, Kardex, MAR and Recent Results. Patient resting in bed. Denies pain or needs. Call bell in reach.      1430 Bedside and Verbal report given to Bia Martinez RN (oncoming nurse).  Report included the following information SBAR, Kardex, [facility] and Recent Results

## 2018-02-09 NOTE — ROUTINE PROCESS
1430: Report given by offgoing RN. 1500: Spoke with daughter and pt about plan of care for pt. They are aware of the intent for discharge in the morning. 1905: Report given to oncoming RN.

## 2018-02-09 NOTE — PROGRESS NOTES
Care Management Interventions  Transition of Care Consult (CM Consult): Home Health ([id number] [facility])  [facility]: No  Reason Outside [place]: Patient already serviced by other home care/hospice agency  Physical Therapy Consult: Yes  Occupational Therapy Consult: Yes  Current Support Network: Lives with Spouse, Family Lives Nearby  Confirm Follow Up Transport: Family  Plan discussed with Pt/Family/Caregiver: Yes  Freedom of Choice Offered: Yes  Discharge Location  Discharge Placement: Home with home health      Orders to resume [facility], referral sent via [facility], called and informed them of patient admission. Anticipate discharge to home tomorrow, patient verbalized no further needs at this time.

## 2018-02-09 NOTE — CARDIO/PULMONARY
Cardiac rehab stopped by to see patient. She is well known to the cardiac rehab program. She stated that she is doing ok at the present time. She is exercising and eating a healthy diet. She appreciated me stopping by to see her. Will follow throughout her hospital stay.

## 2018-02-09 NOTE — PROGRESS NOTES
Verbal and bedside report given to Valeriy Mace RN (Oncoming nurse) by Sarah Bartlett RN (offgoing nurse). Report included SBAR, Kardex, MARS, I/O, and lab results.

## 2018-02-10 NOTE — PROGRESS NOTES
1930:  Beside shift change report given to LUKAS Alfaro RN. Report included the information from the SBAR and [address]. Family/visitors present room. 7:55 AM Beside shift change report given by LUKAS Alfaro RN to oncoming, RN. Report included the information from the SBAR and [address].

## 2018-02-10 NOTE — PROGRESS NOTES
Intern Progress Note  [facility]       Patient: Sammy Coughlin MRN: [id number]  CSN: [id number]    YOB: 1928  Age: 80 y.o. Sex: female    DOA: 2/7/2018 LOS:  LOS: 3 days                    Subjective:     Acute events: No acute events. States she is feeling ok today, but admits to some belly pain which she associates with acid reflux. She has been having trouble eating due to GERD and gas buildup. She has ambulated with assistance and states she felt good about her ambulation. She is worried about her  at home who is sick and is dependent on her. Review of Systems   Constitutional: Negative for chills and fever. Respiratory: Positive for cough. Negative for sputum production and shortness of breath. Cardiovascular: Negative for chest pain and leg swelling. Gastrointestinal: Positive for abdominal pain. Negative for nausea and vomiting. Neurological: Negative for dizziness and headaches. Objective:      Patient Vitals for the past 24 hrs:   Temp Pulse Resp BP SpO2   02/10/18 1107 97.6 °F (36.4 °C) 63 20 141/53 94 %   02/10/18 0800 96.4 °F (35.8 °C) 61 18 145/69 99 %   02/10/18 0400 97.8 °F (36.6 °C) 66 20 146/70 98 %   02/10/18 0000 97.6 °F (36.4 °C) 62 20 136/67 99 %   02/09/18 2100 - - - - 97 %   02/09/18 2000 97.8 °F (36.6 °C) 72 20 133/65 95 %   02/09/18 1526 97.2 °F (36.2 °C) 64 20 131/73 99 %   02/09/18 1148 97 °F (36.1 °C) (!) 59 20 114/67 96 %         Intake/Output Summary (Last 24 hours) at 02/10/18 1118  Last data filed at 02/10/18 0834   Gross per 24 hour   Intake              440 ml   Output             1600 ml   Net            -1160 ml       Physical Exam   Constitutional: She is oriented to person, place, and time. She appears well-developed and well-nourished. HENT:   Head: Normocephalic and atraumatic. Eyes: EOM are normal. No scleral icterus. Neck: Neck supple. Cardiovascular: Normal rate, regular rhythm and normal heart sounds. Pulmonary/Chest: Effort normal. No respiratory distress. Mild rales bibaslar lobes   Musculoskeletal: Normal range of motion. She exhibits edema (Pitting edema. Improved). Neurological: She is alert and oriented to person, place, and time. Skin: Skin is warm. Psychiatric: She has a normal mood and affect. Lab/Data Reviewed:  Recent Results (from the past 12 hour(s))   CBC WITH AUTOMATED DIFF    Collection Time: 02/10/18  2:46 AM   Result Value Ref Range    WBC 4.6 4.6 - 13.2 K/uL    RBC 3.10 (L) 4.20 - 5.30 M/uL    HGB 9.4 (L) 12.0 - 16.0 g/dL    HCT 30.6 (L) 35.0 - 45.0 %    MCV 98.7 (H) 74.0 - 97.0 FL    MCH 30.3 24.0 - 34.0 PG    MCHC 30.7 (L) 31.0 - 37.0 g/dL    RDW 15.5 (H) 11.6 - 14.5 %    PLATELET 212 (L) 760 - 420 K/uL    MPV 10.7 9.2 - 11.8 FL    NEUTROPHILS 65 40 - 73 %    LYMPHOCYTES 18 (L) 21 - 52 %    MONOCYTES 15 (H) 3 - 10 %    EOSINOPHILS 2 0 - 5 %    BASOPHILS 0 0 - 2 %    ABS. NEUTROPHILS 2.9 1.8 - 8.0 K/UL    ABS. LYMPHOCYTES 0.8 (L) 0.9 - 3.6 K/UL    ABS. MONOCYTES 0.7 0.05 - 1.2 K/UL    ABS. EOSINOPHILS 0.1 0.0 - 0.4 K/UL    ABS.  BASOPHILS 0.0 0.0 - 0.1 K/UL    DF AUTOMATED     METABOLIC PANEL, BASIC    Collection Time: 02/10/18  2:46 AM   Result Value Ref Range    Sodium 144 136 - 145 mmol/L    Potassium 3.5 3.5 - 5.5 mmol/L    Chloride 103 100 - 108 mmol/L    CO2 39 (H) 21 - 32 mmol/L    Anion gap 2 (L) 3.0 - 18 mmol/L    Glucose 126 (H) 74 - 99 mg/dL    BUN 32 (H) 7.0 - 18 MG/DL    Creatinine 2.10 (H) 0.6 - 1.3 MG/DL    BUN/Creatinine ratio 15 12 - 20      GFR est AA 27 (L) >60 ml/min/1.73m2    GFR est non-AA 22 (L) >60 ml/min/1.73m2    Calcium 9.1 8.5 - 10.1 MG/DL         Scheduled Medications Reviewed:  Current Facility-Administered Medications   Medication Dose Route Frequency    Lactobacillus Acidoph & Bulgar (FLORANEX) tablet 2 Tab  2 Tab Oral BID    bumetanide (BUMEX) tablet 1 mg  1 mg Oral BID    aspirin chewable tablet 81 mg  81 mg Oral DAILY    calcium-vitamin D (OS-ARUN) 500 mg-200 unit tablet  1 Tab Oral DAILY    carvedilol (COREG) tablet 25 mg  25 mg Oral BID WITH MEALS    ferrous sulfate tablet 650 mg  650 mg Oral DAILY    hydrALAZINE (APRESOLINE) tablet 25 mg  25 mg Oral TID    levothyroxine (SYNTHROID) tablet 50 mcg  50 mcg Oral ACB    pantoprazole (PROTONIX) tablet 40 mg  40 mg Oral DAILY    potassium chloride (KLOR-CON) tablet 10 mEq  10 mEq Oral BID    atorvastatin (LIPITOR) tablet 20 mg  20 mg Oral QHS    isosorbide mononitrate ER (IMDUR) tablet 120 mg  120 mg Oral DAILY         Imaging, microbiology, and EKG/Telemetry:  No new imaging    Assessment/Plan     80 y.o. female with PMH chronic systolic and diastolic CHF, CKD stage IV, HTN, hypothyroidism, anemia, OA, DM- diet controlled, CAD s/p AICD, GERD, HLD, neuropathy, and IBS, admitted with shortness of breath and worsening edema      Shortness of breath 2/2 CHF exacerbation (improved): Pt has history of systolic and diastolic CHF. Echo (7/2017): EF 15-20% with grade 2 diastolic dysfunction. On 2L O2 at home. BNP [id number] at admission. Initial trop: 0.02>0.03>0.03  - Cardiology rec appreciated: Changed diuretic to home med Bumex   - Continue home Coreg 25mg BID  - Daily CBC, BMP  - Daily weights  - Fluid restrictions. Strict I&O  - CM consult  - IS/acapella  - Fall precautions      CAD: s/p dual chamber AICD placement, stents in RCA and LIMA to LAD in 2009. Has been seeing Dr. Christ Dennison as outpatient. CXR shows mitral prosthetic valve (unsure what type)  - Continue home Imdur 120mg daily  - Continue ASA 81mg daily  - Continue home NG PRN  - Monitor on tele      CKD stage IV: Followed by Dr. Toby Gonsalez as outpatient. Baseline Cr 1.4 - 1.9. On admit it was 1.87.  Currently 2.1, likely effect of increased diuretic on admission.  - Avoid nephrotoxic drugs  - Renally dose medications      HTN:   - Continue hydralazine 25mg TID  - Monitor VS      Hypothyroidism: TSH (6/2017): 3.75  - Continue home Synthroid 50mcg daily      HLD: Lipid panel (12/2016): TC- 183, triglycerides- 50, LDL-106, HDL- 67  - Continue home atorvastatin 20mg daily      GERD:   - Protonix 40mg daily as substitution for home omeprazole  - Gas X PRN  - Probiotics (home med)      DM2: diet controlled, A1c (9/2015): 5.9. Currently stable <140  - Monitor clinically      Diet: Cardiac, FR 1L  DVT Prophylaxis: SCDs  Code Status: DNR  Point of Contact: Debbie Forte \"Ree\" Vaibhav                 Granddaughter                      [phone]      Disposition and anticipated LOS: Home with home health. Patient will need palliative care meeting with family. Ziggy Devine  PGY-1 [facility]  02/10/18 11:18 AM

## 2018-02-10 NOTE — PROGRESS NOTES
Problem: Falls - Risk of  Goal: *Absence of Falls  Document Cam Fall Risk and appropriate interventions in the flowsheet.    Outcome: Progressing Towards Goal  Fall Risk Interventions:            Medication Interventions: Assess postural VS orthostatic hypotension, Evaluate medications/consider consulting pharmacy, Patient to call before getting OOB, Teach patient to arise slowly    Elimination Interventions: Call light in reach, Patient to call for help with toileting needs

## 2018-02-10 NOTE — ROUTINE PROCESS
Bedside and Verbal shift change report given by Abiola Redmond RN (offgoing nurse). Report included the following information SBAR, Kardex, MAR and Recent Results. Patient resting in bed. Denies pain or needs. Call bell in reach.     1930 Bedside and Verbal shift change report given to Ifeoma Dasilva RN (oncoming nurse). Report included the following information SBAR, Kardex, MAR and Recent Results. Patient resting in bed. Denies pain or needs.   Call bell in reach.

## 2018-02-10 NOTE — PROGRESS NOTES
conducted a Follow up consultation and Spiritual Assessment for Ammy Butcher, who is a 80 y.o.,female. The  provided the following Interventions:  Continued the relationship of care and support. This was a very brief visit. Chart reviewed. The following outcomes were achieved:    Assessment:      Plan:  Chaplains will continue to follow and will provide pastoral care on an as needed/requested basis.  recommends bedside caregivers page  on duty if patient shows signs of acute spiritual or emotional distress.        [address] [facility].   [phone]

## 2018-02-10 NOTE — PROGRESS NOTES
Problem: Mobility Impaired (Adult and Pediatric)  Goal: *Acute Goals and Plan of Care (Insert Text)  Physical Therapy Goals  Initiated 2/10/2018 and to be accomplished within 5-7 day(s)  1. Patient will move from supine to sit and sit to supine  in bed with modified independence. 2.  Patient will transfer from bed to chair and chair to bed with modified independence using the least restrictive device. 3.  Patient will perform sit to stand with modified independence. 4.  Patient will ambulate with supervision/set-up for 50 feet with the least restrictive device. .  Outcome: Progressing Towards Goal  physical Therapy EVALUATION    Patient: Joshua Garcia (27 y.o. female)  Date: 2/10/2018  Primary Diagnosis: Pleural effusion  CHF exacerbation ([address]     Precautions: Other (comment) (cardiac)    ASSESSMENT :  Based on the objective data described below, the patient presents with decreased endurance and activity tolerance, dyspnea with short distance ambulation . She was alert and supine in bed ready to eat however cooperative for consult. She uses a SC with rubber tip and nasal canula O2 24/7. She is highly independent with all transfers and bed mobility SBA for sup to sit, sit to stand and stand to sit. Due to SOB she required min A for B LEs for sit to sup. She notes SOB post walk of approximately 20 feet in the room from the bed to the saravia and then bathroom door before returning to bed. She notes she is not feeling as well as she did yesterday and was told she may have fluid in her chest.   We reviewed PLB which she is knowledgeable of. Patient will benefit from skilled  PTintervention to address the above impairments and assist with discharge planning.   Patients rehabilitation potential is considered to be Good  Factors which may influence rehabilitation potential include:   [x]         None noted  []         Mental ability/status  []         Medical condition  []         Home/family situation and support systems  []         Safety awareness  []         Pain tolerance/management  []         Other:      PLAN :  Recommendations and Planned Interventions:  [x]           Bed Mobility Training             [x]    Neuromuscular Re-Education  [x]           Transfer Training                   []    Orthotic/Prosthetic Training  [x]           Gait Training                          []    Modalities  [x]           Therapeutic Exercises          []    Edema Management/Control  [x]           Therapeutic Activities            [x]    Patient and Family Training/Education  []           Other (comment):    Frequency/Duration: Patient will be followed by physical therapy 1-2 times per day/4-7 days per week to address goals. Discharge Recommendations: Home Health  Further Equipment Recommendations for Discharge: N/A     G-CODES      Mobility  Current  CI= 1-19%   Goal  CI= 1-19%. The severity rating is based on the Level of Assistance required for Functional Mobility and ADLs. Eval Complexity: History: MEDIUM  Complexity : 1-2 comorbidities / personal factors will impact the outcome/ POC Exam:MEDIUM Complexity : 3 Standardized tests and measures addressing body structure, function, activity limitation and / or participation in recreation  Presentation: LOW Complexity : Stable, uncomplicated  Clinical Decision Making:Other outcome measures level of assistance with functional mobility  LOW Overall Complexity:LOW     SUBJECTIVE:   Patient stated I am not as good as yesterday. I have a bad feeling in my chest and breathing. The doctor said it is fluid.     OBJECTIVE DATA SUMMARY:     Past Medical History:   Diagnosis Date    Actinic keratoses     Angioedema 2003    and respiratory failure from reaction to lisinopril    Atypical chest pain     Cardiac cath 02/06/2009    mRCA 100% (overlapping 2.75 x 28 & 2.75 x 12 Vision BMS). LM patent. mLAD 80%. CX 25%. OM1 100%. LVEDP 28.  EF 45%. Inferobasal akn.   MR 3+.  Left RA 50%. RA 7.  RV 40/4. PA 37/13. W 18.  CO/CI 5.0/3.2 (TD); 4.0 x 2.6 (Dick).  Cardiac echocardiogram 01/15/2017    Severe LVE. EF 20%. Severe diffuse hypk. Indeterminate diastolic fx. RVSP 30 mmHg. Mild DORIS. Mod MR. Mod PI.  IVCE. Left pleural effusion. Similar to study of 2/9/10.  Cardiac nuclear imaging test, abnormal 09/27/2010    Mod basal inferior, early mid inferior infarction w/o ischemia. Mild mid anterior artifact vs infarction w/o ischemia. Massive LVE. EF 18%. Marked septal, inferior hypk. Inferoapical, inferoseptal dysk. Anterior hypk. RVE suggested.  Cardiovascular ankle brachial index 03/28/2014    No arterial disease at rest bilaterally. R АНДРЕЙ 1.02.  L АНДРЕЙ 1.05.  R DBI 0.48. L DBI 0.64. Exercise deferred.  Cardiovascular RLE venous duplex 01/14/2017    Right leg:  No DVT.  Carotid duplex 03/28/2014    Mild <50% bilateral ICA plaquing.       Coronary artery disease     Most likely myocardial infarction 12-08; stenting of the RCA and LIMA to LAD with bypass surgery later that year    Dyslipidemia     GERD (gastroesophageal reflux disease)     Heart failure 6/09,2/10,11/10    readmission to hospital 6/09, 2/10, 11/10    Hypertension     Hypothyroidism     Incomplete bundle branch block     left    Intraventricular conduction delay     with QRS duration of 120 msec with incomplete left BBB    Irritable bowel syndrome     Ischemic cardiomyopathy     EF now in the 15% to 20% range, last assessed September 2010 by nuclear stress test.    Mitral regurgitation     moderately severe, s/p mitral valve repair in 05/2009    Osteoarthritis of knee     Pacemaker 10/10    dual-chamber ICD placed, with LV lead placement with current RV pacing worsening mitral regurgitation    Pulmonary hypertension     moderate    Renal artery stenosis (HCC)     Respiratory failure ([address] 2003    Rheumatoid ONOCASRSL(328.3)     Systolic CHF, chronic (HCC)     class III to IV, with multiple recurrent admissions in the past year.  Thrombocytopenia ([address]      Past Surgical History:   Procedure Laterality Date    HX BLADDER REPAIR      HX CORONARY STENT PLACEMENT  2/6/09    totally occluded right coronary artery was stented successfully with 2.75 mm x 28 mm Vision stent overlapping with the 12 mm length Vision stent    HX HEART CATHETERIZATION  02/09    with stenting of the total occluded right coronary artery    HX HEART VALVE SURGERY      1. Mitral valve repair with size 30 CE Physio ring. 2. Single LIMA to the LAD    HX HEMORRHOIDECTOMY      HX HYSTERECTOMY      HX PACEMAKER  10/13/10    dual-chamber Medtronic AICD without left ventricular lead     Prior Level of Function/Home Situation: Lives at home with older spouse who has medical issues as well. She uses SC with rubber base. She is on O2 at home 24/7  [address]: Private residence  # Steps to Enter: 3  One/Two Story Residence: One story  Living Alone: No  Support Systems: Child(vinnie), Spouse/Significant Other/Partner  Patient Expects to be Discharged to[de-identified] Private residence  Current DME Used/Available at Home: allan Hankins  Critical Behavior:  Neurologic State: Alert  Orientation Level: Oriented X4  Cognition: Appropriate decision making; Appropriate for age attention/concentration; Appropriate safety awareness; Follows commands  Safety/Judgement: Fall prevention  Psychosocial  Patient Behaviors: Calm; Cooperative  Purposeful Interaction: Yes  Strength:    Strength:  Within functional limits  Tone & Sensation:   Tone: Normal  Sensation: Intact  Range Of Motion:  AROM: Within functional limits  Functional Mobility:  Bed Mobility:  Rolling: Stand-by asssistance  Supine to Sit: Stand-by asssistance  Sit to Supine: Minimum assistance (LEs)  Transfers:  Sit to Stand: Stand-by asssistance  Stand to Sit: Stand-by asssistance  Balance:   Sitting: Intact  Standing: With support  Ambulation/Gait Training:  Distance (ft): 20 Feet (ft)  Assistive Device: Cane, straight  Ambulation - Level of Assistance: Stand-by asssistance     Gait Description (WDL): Exceptions to [facility]    Base of Support: Narrowed     Speed/Diane: Delayed  Step Length: Left shortened;Right shortened     Therapeutic Exercises:   Reviewed LE AROM she could do in sitting or supine  Pain:  Pt reports 0/10 pain or discomfort prior to treatment.    Pt reports 0/10 pain or discomfort post treatment. Activity Tolerance:   lyn well with SOB noted after return to sitting on EOB  Please refer to the flowsheet for vital signs taken during this treatment. After treatment:   []         Patient left in no apparent distress sitting up in chair  [x]         Patient left in no apparent distress in bed  [x]         Call bell left within reach  []         Nursing notified  []         Caregiver present  []         Bed alarm activated    COMMUNICATION/EDUCATION:   [x]         Fall prevention education was provided and the patient/caregiver indicated understanding. [x]         Patient/family have participated as able in goal setting and plan of care. [x]         Patient/family agree to work toward stated goals and plan of care. []         Patient understands intent and goals of therapy, but is neutral about his/her participation. []         Patient is unable to participate in goal setting and plan of care.     Thank you for this referral.  Reyes Daniels, PT   Time Calculation: 11 mins

## 2018-02-11 NOTE — ROUTINE PROCESS
Bedside and Verbal shift change report given by Tashia Philip RN (off going nurse). Report included the following information SBAR, Kardex, MAR and Recent Results. Patient resting in bed. Denies pain or needs. Call bell in reach.      1945 Bedside and Verbal shift change report given to Reanna Heard RN (oncoming nurse). Report included the following information SBAR, Kardex, MAR and Recent Results. Patient resting in bed. Denies pain or needs.   Call bell in reach.

## 2018-02-11 NOTE — PROGRESS NOTES
Occupational Therapy Note:  Orders received, chart reviewed and OT evaluation initiated. Patient with MD and upon return was on the phone requesting OT to return later. Will f/u as appropriate for this patient.  Krystal Ybarra, OTR/L

## 2018-02-11 NOTE — PROGRESS NOTES
Intern Progress Note  [facility]       Patient: Cheri Abel MRN: [id number]  CSN: [id number]    YOB: 1928  Age: 80 y.o. Sex: female    DOA: 2/7/2018 LOS:  LOS: 4 days                    Subjective:     Acute events: No acute events. Patient was sitting in bed about to give herself a sponge bath. Patient states that she is feeling better and denies any worsening SOB. States she is unsure about going home to her . Would like to have a family meeting with palliative tomorrow. Review of Systems   Constitutional: Negative for chills and fever. Respiratory: Negative for cough, sputum production and shortness of breath. Cardiovascular: Negative for chest pain and leg swelling. Gastrointestinal: Negative for abdominal pain, nausea and vomiting. Neurological: Negative for dizziness and headaches. Objective:      Patient Vitals for the past 24 hrs:   Temp Pulse Resp BP SpO2   02/11/18 0336 98.1 °F (36.7 °C) 73 20 125/58 94 %   02/10/18 2344 98.4 °F (36.9 °C) 63 20 123/70 97 %   02/10/18 1913 98.3 °F (36.8 °C) 71 20 136/71 93 %         Intake/Output Summary (Last 24 hours) at 02/11/18 1305  Last data filed at 02/11/18 0532   Gross per 24 hour   Intake              360 ml   Output              700 ml   Net             -340 ml       Physical Exam   Constitutional: She is oriented to person, place, and time. She appears well-developed and well-nourished. HENT:   Head: Normocephalic and atraumatic. Eyes: EOM are normal. No scleral icterus. Neck: Neck supple. Cardiovascular: Normal rate, regular rhythm and normal heart sounds. Pulmonary/Chest: Effort normal. No respiratory distress. Mild rales bibaslar lobes   Musculoskeletal: Normal range of motion. She exhibits edema (Pitting edema. Improved). Neurological: She is alert and oriented to person, place, and time. Skin: Skin is warm. Psychiatric: She has a normal mood and affect.        Alida Silva Reviewed:  Recent Results (from the past 12 hour(s))   CBC WITH AUTOMATED DIFF    Collection Time: 02/11/18  3:41 AM   Result Value Ref Range    WBC 4.6 4.6 - 13.2 K/uL    RBC 3.14 (L) 4.20 - 5.30 M/uL    HGB 9.4 (L) 12.0 - 16.0 g/dL    HCT 31.0 (L) 35.0 - 45.0 %    MCV 98.7 (H) 74.0 - 97.0 FL    MCH 29.9 24.0 - 34.0 PG    MCHC 30.3 (L) 31.0 - 37.0 g/dL    RDW 15.7 (H) 11.6 - 14.5 %    PLATELET 344 (L) 474 - 420 K/uL    MPV 10.5 9.2 - 11.8 FL    NEUTROPHILS 64 40 - 73 %    LYMPHOCYTES 20 (L) 21 - 52 %    MONOCYTES 13 (H) 3 - 10 %    EOSINOPHILS 3 0 - 5 %    BASOPHILS 0 0 - 2 %    ABS. NEUTROPHILS 3.0 1.8 - 8.0 K/UL    ABS. LYMPHOCYTES 0.9 0.9 - 3.6 K/UL    ABS. MONOCYTES 0.6 0.05 - 1.2 K/UL    ABS. EOSINOPHILS 0.2 0.0 - 0.4 K/UL    ABS.  BASOPHILS 0.0 0.0 - 0.1 K/UL    DF AUTOMATED     METABOLIC PANEL, BASIC    Collection Time: 02/11/18  3:41 AM   Result Value Ref Range    Sodium 146 (H) 136 - 145 mmol/L    Potassium 3.4 (L) 3.5 - 5.5 mmol/L    Chloride 103 100 - 108 mmol/L    CO2 37 (H) 21 - 32 mmol/L    Anion gap 6 3.0 - 18 mmol/L    Glucose 111 (H) 74 - 99 mg/dL    BUN 29 (H) 7.0 - 18 MG/DL    Creatinine 1.99 (H) 0.6 - 1.3 MG/DL    BUN/Creatinine ratio 15 12 - 20      GFR est AA 29 (L) >60 ml/min/1.73m2    GFR est non-AA 24 (L) >60 ml/min/1.73m2    Calcium 9.3 8.5 - 10.1 MG/DL         Scheduled Medications Reviewed:  Current Facility-Administered Medications   Medication Dose Route Frequency    Lactobacillus Acidoph & Bulgar (FLORANEX) tablet 2 Tab  2 Tab Oral BID    bumetanide (BUMEX) tablet 1 mg  1 mg Oral BID    aspirin chewable tablet 81 mg  81 mg Oral DAILY    calcium-vitamin D (OS-ARUN) 500 mg-200 unit tablet  1 Tab Oral DAILY    carvedilol (COREG) tablet 25 mg  25 mg Oral BID WITH MEALS    ferrous sulfate tablet 650 mg  650 mg Oral DAILY    hydrALAZINE (APRESOLINE) tablet 25 mg  25 mg Oral TID    levothyroxine (SYNTHROID) tablet 50 mcg  50 mcg Oral ACB    pantoprazole (PROTONIX) tablet 40 mg  40 mg Oral DAILY    potassium chloride (KLOR-CON) tablet 10 mEq  10 mEq Oral BID    atorvastatin (LIPITOR) tablet 20 mg  20 mg Oral QHS    isosorbide mononitrate ER (IMDUR) tablet 120 mg  120 mg Oral DAILY     Imaging, microbiology, and EKG/Telemetry:  No new imaging    Assessment/Plan     80 y.o. female with PMH chronic systolic and diastolic CHF, CKD stage IV, HTN, hypothyroidism, anemia, OA, DM- diet controlled, CAD s/p AICD, GERD, HLD, neuropathy, and IBS, admitted with shortness of breath and worsening edema      Shortness of breath 2/2 CHF exacerbation (improved): Pt has history of systolic and diastolic CHF. Echo (7/2017): EF 15-20% with grade 2 diastolic dysfunction. On 2L O2 at home. BNP [id number] at admission. Initial trop: 0.02>0.03>0.03  - Cardiology rec appreciated  - Continue home Coreg 25mg BID  - Daily CBC, BMP  - Daily weights  - Fluid restrictions. Strict I&O  - CM/Palliative consult  - IS/acapella  - Fall precautions      CAD: s/p dual chamber AICD placement, stents in RCA and LIMA to LAD in 2009. Has been seeing Dr. Bran Nash as outpatient. CXR shows mitral prosthetic valve (unsure what type)  - Continue home Imdur 120mg daily  - Continue ASA 81mg daily  - Continue home NG PRN  - Monitor on tele      CKD stage IV, stable: Followed by Dr. Rancho Camara as outpatient. Baseline Cr 1.4 - 1.9. On admit it was 1.87. Currently 2.1, likely effect of increased diuretic on admission.  - Avoid nephrotoxic drugs  - Renally dose medications      HTN, stable:   - Continue hydralazine 25mg TID  - Monitor VS      Hypothyroidism: TSH (6/2017): 3.75  - Continue home Synthroid 50mcg daily      HLD: Lipid panel (12/2016): TC- 183, triglycerides- 50, LDL-106, HDL- 67  - Continue home atorvastatin 20mg daily      GERD:   - Protonix 40mg daily as substitution for home omeprazole  - Gas X PRN  - Probiotics (home med)      DM2: diet controlled, A1c (9/2015): 5.9.  Currently stable <140  - Monitor clinically      Diet: Cardiac, FR 1L  DVT Prophylaxis: SCDs  Code Status: DNR  Point of Contact: Soren Samuel \"Ree\" Vaibhav (Granddaughter)  [phone]      Disposition and anticipated LOS: Home with home health. Patient will need palliative care meeting with family.     Apolinar Sandoval DO PGY-2  120 [facility]  02/11/18 11:18 AM

## 2018-02-11 NOTE — ROUTINE PROCESS
1946 Assumed care of patient from off going nurse. Patient resting in bed. No distress noted. Call bell within reach, siderails up x 3, bed in lowest position, and patient instructed to use call bell for assistance. Will continue to monitor. Bed alarm activated. 0720 Bedside and Verbal shift change report given to [address] [place] [address] (oncoming nurse) by Clarisa Vanegas RN(offgoing nurse). Report included the following information Kardex, Intake/Output, MAR and Recent Results.

## 2018-02-12 NOTE — PROGRESS NOTES
Problem: Mobility Impaired (Adult and Pediatric)  Goal: *Acute Goals and Plan of Care (Insert Text)  Physical Therapy Goals  Initiated 2/10/2018 and to be accomplished within 5-7 day(s)  1. Patient will move from supine to sit and sit to supine  in bed with modified independence. 2.  Patient will transfer from bed to chair and chair to bed with modified independence using the least restrictive device. 3.  Patient will perform sit to stand with modified independence. 4.  Patient will ambulate with supervision/set-up for 50 feet with the least restrictive device. .   physical Therapy TREATMENT    Patient: Rajiv Redmond (44 y.o. female)  Date: 2/12/2018  Diagnosis: Pleural effusion  CHF exacerbation (HCC) CHF exacerbation (HCC)       Precautions: Other (comment) (cardiac)  Chart, physical therapy assessment, plan of care and goals were reviewed. ASSESSMENT:  Increased activity tolerance noted as pt is able to significantly increase single trial ambulation distance with decreased assist req'd. Pt is motivated to participate and receptive to recommendations specifically pursed lip breathing with emphasis on exhale with exertion (long and strong). Pt is progressing toward all goals and will benefit from cont'd PT in [facility] setting to facilitate increased strength,increased activity tolerance, and maximal safety with household mobility. Progression toward goals:  [x]      Improving appropriately and progressing toward goals  []      Improving slowly and progressing toward goals  []      Not making progress toward goals and plan of care will be adjusted     PLAN:  Patient continues to benefit from skilled intervention to address the above impairments. Continue treatment per established plan of care. Discharge Recommendations:  Home Health  Further Equipment Recommendations for Discharge:  N/A     G-CODES:     Mobility  Current  CI= 1-19%   Goal  CI= 1-19%.   The severity rating is based on the Level of Assistance required for Functional Mobility and ADLs. SUBJECTIVE:   Patient stated I have been doing the best I can. You see my  is 80 and we do the best we can.     OBJECTIVE DATA SUMMARY:   Critical Behavior:  Neurologic State: Alert, Eyes open spontaneously, Appropriate for age  Orientation Level: Oriented X4  Cognition: Appropriate decision making, Appropriate for age attention/concentration, Appropriate safety awareness, Follows commands  Safety/Judgement: Fall prevention  Functional Mobility Training:  Bed Mobility:  Rolling: Independent  Supine to Sit: Independent  Sit to Supine: Independent  Scooting: Independent  Transfers:  Sit to Stand: Modified independent  Stand to Sit: Supervision  Bed to Chair: Supervision  Other: stand step without AD  Balance:  Sitting: Intact  Standing: With support; Impaired (0 overt LOB noted. mild SOB noted)  Standing - Static: Fair  Standing - Dynamic : Fair  Ambulation/Gait Training:  Distance (ft): 65 Feet (ft)  Assistive Device: Cane, straight  Ambulation - Level of Assistance: Supervision  Gait Abnormalities: Decreased step clearance (good step through pattern with straight cane(triangle point))  Base of Support: Narrowed  Step Length: Left shortened;Right shortened  Swing Pattern: Right symmetrical;Left symmetrical  Therapeutic Exercises:   Seated: h/t raises,march with step over,laq  Sets: 2  Reps: 12  Assist level: supervision  Pain:  Pt reports 0/10 pain or discomfort prior to treatment.    Pt reports 0/10 pain or discomfort post treatment. Activity Tolerance:   Fair+  Please refer to the flowsheet for vital signs taken during this treatment.   After treatment:   [] Patient left in no apparent distress sitting up in chair  [x] Patient left in no apparent distress in bed  [x] Call bell left within reach  [] Nursing notified  [] Caregiver present  [] Bed alarm activated      Lucinda Acosta PTA   Time Calculation: 24 mins

## 2018-02-12 NOTE — PROGRESS NOTES
Intern Progress Note  [facility]       Patient: Tonya Caceres MRN: [id number]  CSN: [id number]    YOB: 1928  Age: 80 y.o. Sex: female    DOA: 2/7/2018 LOS:  LOS: 5 days                    Subjective:     Acute events: No acute events. Says she isn't feeling as good today as she did prior. She is complaining of rectal pain and having no BM for 3 days. Also admits to straining but denies any bleed on toilet paper. States she had dyspnea while ambulating to the bathroom. Otherwise her eructation and flatus has gone down. Review of Systems   Constitutional: Negative for chills and fever. Eyes: Negative for blurred vision. Respiratory: Negative for shortness of breath. Cardiovascular: Negative for chest pain. Gastrointestinal: Positive for constipation. Negative for abdominal pain, nausea and vomiting. Musculoskeletal: Negative for myalgias. Neurological: Negative for tingling and headaches. Objective:      Patient Vitals for the past 24 hrs:   Temp Pulse Resp BP SpO2   02/12/18 0402 97.7 °F (36.5 °C) (!) 59 20 148/72 96 %   02/11/18 2319 97.9 °F (36.6 °C) 61 20 132/68 99 %   02/11/18 1948 97.8 °F (36.6 °C) (!) 59 20 130/54 98 %   02/11/18 1600 98.2 °F (36.8 °C) 64 20 125/72 98 %   02/11/18 1200 98 °F (36.7 °C) 64 20 122/72 97 %   02/11/18 0900 97.6 °F (36.4 °C) 60 18 124/70 96 %         Intake/Output Summary (Last 24 hours) at 02/12/18 0735  Last data filed at 02/12/18 0044   Gross per 24 hour   Intake              250 ml   Output              825 ml   Net             -575 ml       Physical Exam   Constitutional: She is oriented to person, place, and time. She appears well-developed. No distress. HENT:   Head: Normocephalic and atraumatic. Eyes: EOM are normal.   Neck: Normal range of motion. Neck supple. Cardiovascular: Normal rate, regular rhythm and normal heart sounds. Pulmonary/Chest: Effort normal. She has rales. Abdominal: Soft.  Bowel sounds are normal.   Musculoskeletal: Normal range of motion. She exhibits edema (BLE). Neurological: She is alert and oriented to person, place, and time. Skin: Skin is warm and dry. She is not diaphoretic. Psychiatric: She has a normal mood and affect. Lab/Data Reviewed:  Recent Results (from the past 12 hour(s))   CBC WITH AUTOMATED DIFF    Collection Time: 02/12/18  2:28 AM   Result Value Ref Range    WBC 4.6 4.6 - 13.2 K/uL    RBC 3.07 (L) 4.20 - 5.30 M/uL    HGB 9.1 (L) 12.0 - 16.0 g/dL    HCT 30.7 (L) 35.0 - 45.0 %    .0 (H) 74.0 - 97.0 FL    MCH 29.6 24.0 - 34.0 PG    MCHC 29.6 (L) 31.0 - 37.0 g/dL    RDW 16.0 (H) 11.6 - 14.5 %    PLATELET 537 (L) 612 - 420 K/uL    MPV 11.1 9.2 - 11.8 FL    NEUTROPHILS 64 40 - 73 %    LYMPHOCYTES 17 (L) 21 - 52 %    MONOCYTES 17 (H) 3 - 10 %    EOSINOPHILS 2 0 - 5 %    BASOPHILS 0 0 - 2 %    ABS. NEUTROPHILS 3.0 1.8 - 8.0 K/UL    ABS. LYMPHOCYTES 0.8 (L) 0.9 - 3.6 K/UL    ABS. MONOCYTES 0.8 0.05 - 1.2 K/UL    ABS. EOSINOPHILS 0.1 0.0 - 0.4 K/UL    ABS.  BASOPHILS 0.0 0.0 - 0.1 K/UL    DF AUTOMATED     METABOLIC PANEL, BASIC    Collection Time: 02/12/18  2:28 AM   Result Value Ref Range    Sodium 144 136 - 145 mmol/L    Potassium 3.4 (L) 3.5 - 5.5 mmol/L    Chloride 101 100 - 108 mmol/L    CO2 38 (H) 21 - 32 mmol/L    Anion gap 5 3.0 - 18 mmol/L    Glucose 116 (H) 74 - 99 mg/dL    BUN 31 (H) 7.0 - 18 MG/DL    Creatinine 1.92 (H) 0.6 - 1.3 MG/DL    BUN/Creatinine ratio 16 12 - 20      GFR est AA 30 (L) >60 ml/min/1.73m2    GFR est non-AA 25 (L) >60 ml/min/1.73m2    Calcium 9.4 8.5 - 10.1 MG/DL         Scheduled Medications Reviewed:  Current Facility-Administered Medications   Medication Dose Route Frequency    potassium chloride (K-DUR, KLOR-CON) SR tablet 20 mEq  20 mEq Oral BID    Lactobacillus Acidoph & Bon[facility]) tablet 2 Tab  2 Tab Oral BID    bumetanide (BUMEX) tablet 1 mg  1 mg Oral BID    aspirin chewable tablet 81 mg  81 mg Oral DAILY    calcium-vitamin D (OS-ARUN) 500 mg-200 unit tablet  1 Tab Oral DAILY    carvedilol (COREG) tablet 25 mg  25 mg Oral BID WITH MEALS    ferrous sulfate tablet 650 mg  650 mg Oral DAILY    hydrALAZINE (APRESOLINE) tablet 25 mg  25 mg Oral TID    levothyroxine (SYNTHROID) tablet 50 mcg  50 mcg Oral ACB    pantoprazole (PROTONIX) tablet 40 mg  40 mg Oral DAILY    atorvastatin (LIPITOR) tablet 20 mg  20 mg Oral QHS    isosorbide mononitrate ER (IMDUR) tablet 120 mg  120 mg Oral DAILY         Imaging, microbiology, and EKG/Telemetry:  No new imaging    Assessment/Plan     80 y.o. female with PMH chronic systolic and diastolic CHF, CKD stage IV, HTN, hypothyroidism, anemia, OA, DM- diet controlled, CAD s/p AICD, GERD, HLD, neuropathy, and IBS, admitted with shortness of breath and worsening edema      Shortness of breath 2/2 CHF exacerbation (improved): Pt has history of systolic and diastolic CHF. Echo (7/2017): EF 15-20% with grade 2 diastolic dysfunction. On 2L O2 at home. BNP [id number] at admission. Initial trop: 0.02>0.03>0.03  - Cardiology rec appreciated: on home regimen currently  - Continue home Coreg 25mg BID  - Daily CBC, BMP  - Daily weights  - Fluid restrictions. Strict I&O  - CM/Palliative consult (planned meeting today)  - IS/acapella  - Fall precautions  - Consider suppository PRN      CAD: s/p dual chamber AICD placement, stents in RCA and LIMA to LAD in 2009. Has been seeing Dr. Evy Briceño as outpatient. CXR shows mitral prosthetic valve (unsure what type)  - Continue home Imdur 120mg daily  - Continue ASA 81mg daily  - Continue home NG PRN  - Monitor on tele      CKD stage IV, stable: Followed by Dr. Colletta Atkinson as outpatient. Baseline Cr 1.4 - 1.9. On admit it was 1.87.  Currently 1.92, likely effect of increased diuretic on admission.  - Avoid nephrotoxic drugs  - Renally dose medications      HTN, stable:   - Continue hydralazine 25mg TID  - Monitor VS      Hypothyroidism: TSH (6/2017): 3.75  - Continue home Synthroid 50mcg daily      HLD: Lipid panel (12/2016): TC- 183, triglycerides- 50, LDL-106, HDL- 67  - Continue home atorvastatin 20mg daily      GERD:   - Protonix 40mg daily as substitution for home omeprazole  - Gas X PRN  - Probiotics (home med)      DM2: diet controlled, A1c (9/2015): 5.9. Currently stable <140  - Monitor clinically      Diet: Cardiac, FR 1L  DVT Prophylaxis: SCDs  Code Status: DNR  Point of Contact: Miesha Sanders \"Ree\" Vaibhav (Granddaughter)  [phone]      Disposition and anticipated LOS: Palliative care meeting today    Patrick Ramirez  PGY-1 120 [facility]  02/12/18 7:35 AM

## 2018-02-12 NOTE — PROGRESS NOTES
Care Management Interventions  Transition of Care Consult (CM Consult): Home Hospice ([facility] )  [address]: No  [facility]: No  Reason Outside [place]: Patient already serviced by other home care/hospice agency  Physical Therapy Consult: Yes  Occupational Therapy Consult: Yes  Current Support Network: Lives with Spouse  Confirm Follow Up Transport: Family  Plan discussed with Pt/Family/Caregiver: Yes  Freedom of Choice Offered: Yes  Discharge Location  Discharge Placement: Home with hospice    Spoke with Mana Siu from [facility] and she stated that she spoke with patient and she is wanting to go home with home hospice. Spoke with Dr. Bethany Mccray and patient to be discharge to home tonight. Spoke with patient and she stated understanding with Hospice, and verbalized no further needs at this time. Anticipate discharge to home this evening.

## 2018-02-12 NOTE — PROGRESS NOTES
Received report on pt.from off going RN. Pt was sitting in the side of the bed and stated she had gone into BR to try to wash up but was too weak and got SOB so had to sit back down on the bed. Reminded pt to not get up w/o help. Assisted into chair for increased comfort. Call light at side. Pt sts she will not get up without assistance. No acute distress noted. Will cont to monitor for changes in status. 1300 palliative care team in to talk with pt and family. 1400 [employer] Rep in and deactivated ICD.

## 2018-02-12 NOTE — PROGRESS NOTES
OT orders received and chart reviewed. Patient meeting with hospice. Patient reported she wants OT services in the hospital and requests to come back tomorrow. Will follow up. Thank you.     Roxana Gaytan OTR/L

## 2018-02-12 NOTE — ACP (ADVANCE CARE PLANNING)
POST form was explained and completed by Palliative NP Marnie Catherine and Ms Evelyne Hill. POST indicates DNR, DNI, comfort measures and no feeding tube. All of Ms Jerry Irene choices are consistent with her clear goals of care. NP Marnie Catherine noted on the POST form that Ms Evelyne Hill does want to come back to hospital if her symptoms are not able to be controlled if she becomes short of breath. Copies were made and one given to Ms Evelyne Hill; original placed on chart.

## 2018-02-12 NOTE — PROGRESS NOTES
Care Management Interventions  Transition of Care Consult (CM Consult): Home Health ([address] [place] [address])  [address]: No  Reason Outside [place]: Patient already serviced by other home care/hospice agency  Physical Therapy Consult: Yes  Occupational Therapy Consult: Yes  Current Support Network: Lives with Spouse, Family Lives Nearby  Confirm Follow Up Transport: Family  Plan discussed with Pt/Family/Caregiver: Yes  Freedom of Choice Offered: Yes  Discharge Location  Discharge Placement: Home with home health      Patient met with Palliative Care Team today at bedside. Orders for Hospice consult, patient already had [id number] [place] [address]  Referral called to [facility] and will come to hospital today to meet with patient and give information concerning Hospice benefits. Patient is going to have ICD turned off prior to discharge. Will remain available.

## 2018-02-12 NOTE — CONSULTS
[facility]: [phone] ([phone])  [facility]: [phone]   [facility]: [phone]    Patient Name: Cisco Dahl  YOB: 1928    Date of Initial Consult: 2/12/18  Reason for Consult: care decisions  Requesting Provider: Dr. Milford Favre  Primary Care Physician: Annalee Sam MD      SUMMARY:   Cisco Dahl is a 80y.o. year old with an extensive past medical history including mixed diastolic and systolic heart failure, CKD, pulmonary HTN, HTN, incomplete bundle branch block, ischemic cardiomyopathy, who was admitted on 2/7/2018 from home with a diagnosis of CHF exacerbation. Current medical issues leading to Palliative Medicine involvement include: 79 y/o with CHF, EF 15-20%, 3 admissions over the past year, patient has said she wants to discuss goals of care with family present. PALLIATIVE DIAGNOSES:   1. ACP/goals of care discussion  2. CHF  3. CKD  4. AICD  5. Debility         PLAN:   1. ACP/goals of care discussion: Met with patient at bedside along with [address] [place] [address] Road. The patient's son Anjali Rice is present and several other children are on speaker phone. The patient's granddaughter also called in later in the conversation. We discussed the patient's chronic medical issues at length and talked about goals of care. The patient clearly expressed a wish for comfort, stating that she is tired. She does not want to come back to the hospital excepting uncontrolled symptoms. Her family expressed concerns that she lives alone with her elderly, ill . We discussed options for additional help and home and talked about hospice care in detail. The patient says that she is interested in hospice care at home. Placed consult for information and spoke with .  The patient confirmed that she is a DNR/DNI and completed a POST today for DNR, comfort measures (transfer to hospital if SOB cannot be managed at home), and no feeding tube. For now continue current care, but no escalation of care (no ICU, no intubation, no pressors), DNR/DNI. 2. CHF: combined systolic and diastolic, EF 24-36%. Discussed with family. Patient says she does not want further aggressive interventions, but wants to be comfortable  3. CKD: stage 4, discussed difficulty in treating CHF and CKD, patient says she would not want dialysis  4. AICD: [employer] dual chamber, placed in 2010, patient states she has never noticed a discharge. After discussion she would like the AICD deactivated. [employer] has been contacted. 5. Debility: The patient was previously able to keep up with housework and ADLs, but has been unable as her fatigue and SOB have worsened, discussed options for help at home  6. Initial consult note routed to primary continuity provider  7. Communicated plan of care with: Palliative IDT    GOALS OF CARE:  Patient/Health Care Proxy Stated Goals: Comfort      TREATMENT PREFERENCES:   Code Status: DNR    Advance Care Planning:  Advance Care Planning 2/8/2018   Patient's Healthcare Decision Maker is: Legal Next of Sara 69   Primary Decision Maker Name Cammie Maloney   Primary Decision Maker Phone Number [phone]   Primary Decision Maker Relationship to Patient Adult child   Secondary Decision Maker Name -   Secondary Decision Maker Relationship to Patient -   Confirm Advance Directive Yes, not on file   Patient Would Like to Complete Advance Directive -   Does the patient have other document types Do Not Resuscitate       Medical Interventions: Comfort measures (once at home)  Other Instructions: continue current care for now, no escalation of care  Artificially Administered Nutrition: No feeding tube     Other:  As far as possible, the palliative care team has discussed with patient / health care proxy about goals of care / treatment preferences for patient.      HISTORY:     History obtained from: family, patient, chart    CHIEF COMPLAINT: SOB, LE edema    HPI/SUBJECTIVE:    The patient is:   [x] Verbal and participatory  [] Non-participatory due to:       Clinical Pain Assessment (nonverbal scale for nonverbal patients): Clinical Pain Assessment  Severity: 0          Duration: for how long has pt been experiencing pain (e.g., 2 days, 1 month, years)  Frequency: how often pain is an issue (e.g., several times per day, once every few days, constant)     FUNCTIONAL ASSESSMENT:     Palliative Performance Scale (PPS):  PPS: 30            PSYCHOSOCIAL/SPIRITUAL SCREENING:      Any spiritual / Voodoo concerns:  [] Yes /  [x] No    Caregiver Burnout:  [] Yes /  [x] No /  [] No Caregiver Present      Anticipatory grief assessment:   [x] Normal  / [] Maladaptive        REVIEW OF SYSTEMS:     Positive and pertinent negative findings in ROS are noted above in HPI. The following systems were [x] reviewed / [] unable to be reviewed as noted in HPI  Other findings are noted below. Systems: constitutional, ears/nose/mouth/throat, respiratory, gastrointestinal, genitourinary, musculoskeletal, integumentary, neurologic, psychiatric, endocrine. Positive findings noted below. Modified ESAS Completed by: provider   Fatigue: 4       Pain: 0           Dyspnea: 4           Stool Occurrence(s): 1        PHYSICAL EXAM:     Wt Readings from Last 3 Encounters:   02/12/18 57.9 kg (127 lb 11.2 oz)   10/10/17 55.8 kg (123 lb)   08/28/17 54.9 kg (121 lb)     Blood pressure 128/73, pulse 62, temperature 97.5 °F (36.4 °C), resp. rate 18, height 5' 3\" (1.6 m), weight 57.9 kg (127 lb 11.2 oz), SpO2 97 %, not currently breastfeeding.   Pain:  Pain Scale 1: Numeric (0 - 10)  Pain Intensity 1: 0              Pain Intervention(s) 1: Medication (see MAR)  Last bowel movement: none recorded    Constitutional: eldery AA woman, in bed with HOB elevated  Eyes: pupils equal, anicteric  ENMT: no nasal discharge, moist mucous membranes, NC  Cardiovascular: no edema  Respiratory: dyspneic with speech  Musculoskeletal: no deformity, no tenderness to palpation  Skin: warm, dry  Neurologic: following commands, moving all extremities  Psychiatric: full affect, no hallucinations       HISTORY:     Principal Problem:    CHF exacerbation ([address] (2/7/2018)    Active Problems:    Pleural effusion (2/7/2018)      Past Medical History:   Diagnosis Date    Actinic keratoses     Angioedema 2003    and respiratory failure from reaction to lisinopril    Atypical chest pain     Cardiac cath 02/06/2009    mRCA 100% (overlapping 2.75 x 28 & 2.75 x 12 Vision BMS). LM patent. mLAD 80%. CX 25%. OM1 100%. LVEDP 28.  EF 45%. Inferobasal akn. MR 3+. Left RA 50%. RA 7.  RV 40/4. PA 37/13. W 18.  CO/CI 5.0/3.2 (TD); 4.0 x 2.6 (Dick).  Cardiac echocardiogram 01/15/2017    Severe LVE. EF 20%. Severe diffuse hypk. Indeterminate diastolic fx. RVSP 30 mmHg. Mild DORIS. Mod MR. Mod PI.  IVCE. Left pleural effusion. Similar to study of 2/9/10.  Cardiac nuclear imaging test, abnormal 09/27/2010    Mod basal inferior, early mid inferior infarction w/o ischemia. Mild mid anterior artifact vs infarction w/o ischemia. Massive LVE. EF 18%. Marked septal, inferior hypk. Inferoapical, inferoseptal dysk. Anterior hypk. RVE suggested.  Cardiovascular ankle brachial index 03/28/2014    No arterial disease at rest bilaterally. R АНДРЕЙ 1.02.  L АНДРЕЙ 1.05.  R DBI 0.48. L DBI 0.64. Exercise deferred.  Cardiovascular RLE venous duplex 01/14/2017    Right leg:  No DVT.  Carotid duplex 03/28/2014    Mild <50% bilateral ICA plaquing.       Coronary artery disease     Most likely myocardial infarction 12-08; stenting of the RCA and LIMA to LAD with bypass surgery later that year    Dyslipidemia     GERD (gastroesophageal reflux disease)     Heart failure 6/09,2/10,11/10    readmission to hospital 6/09, 2/10, 11/10    Hypertension     Hypothyroidism     Incomplete bundle branch block     left  Intraventricular conduction delay     with QRS duration of 120 msec with incomplete left BBB    Irritable bowel syndrome     Ischemic cardiomyopathy     EF now in the 15% to 20% range, last assessed September 2010 by nuclear stress test.    Mitral regurgitation     moderately severe, s/p mitral valve repair in 05/2009    Osteoarthritis of knee     Pacemaker 10/10    dual-chamber ICD placed, with LV lead placement with current RV pacing worsening mitral regurgitation    Pulmonary hypertension     moderate    Renal artery stenosis (HCC)     Respiratory failure ([address] 2003    Rheumatoid [id number](105.5)     Systolic CHF, chronic (HCC)     class III to IV, with multiple recurrent admissions in the past year.  Thrombocytopenia ([address]       Past Surgical History:   Procedure Laterality Date    HX BLADDER REPAIR      HX CORONARY STENT PLACEMENT  2/6/09    totally occluded right coronary artery was stented successfully with 2.75 mm x 28 mm Vision stent overlapping with the 12 mm length Vision stent    HX HEART CATHETERIZATION  02/09    with stenting of the total occluded right coronary artery    HX HEART VALVE SURGERY      1. Mitral valve repair with size 30 CE Physio ring. 2. Single LIMA to the LAD    HX HEMORRHOIDECTOMY      HX HYSTERECTOMY      HX PACEMAKER  10/13/10    dual-chamber Medtronic AICD without left ventricular lead      Family History   Problem Relation Age of Onset    Hypertension Sister      History reviewed, no pertinent family history.   Social History   Substance Use Topics    Smoking status: Never Smoker    Smokeless tobacco: Never Used    Alcohol use No     Allergies   Allergen Reactions    Ace Inhibitors Swelling     wheezing    Ciprofloxacin Nausea Only     Dizziness, nausea    Codeine Nausea Only    Cozaar [Losartan] Swelling     Tongue edema and generalized swelling    Darvocet A500 [Propoxyphene N-Acetaminophen] Other (comments)     Dizziness    Gabapentin Swelling (Neurontin)    Lisinopril Swelling     Throat swells      Magox [Magnesium Oxide] Diarrhea    Norvasc [Amlodipine] Swelling     Tongue edema and generalized swelling    Smz-Tmp Ds [Sulfamethoxazole-Trimethoprim] Swelling     Throat swells      Current Facility-Administered Medications   Medication Dose Route Frequency    potassium chloride (K-DUR, KLOR-CON) SR tablet 20 mEq  20 mEq Oral BID    Lactobacillus Acidoph & Bulgar [facility]) tablet 2 Tab  2 Tab Oral BID    simethicone (MYLICON) tablet 40 mg  40 mg Oral TID PRN    bumetanide (BUMEX) tablet 1 mg  1 mg Oral BID    acetaminophen (TYLENOL) tablet 500 mg  500 mg Oral Q4H PRN    aspirin chewable tablet 81 mg  81 mg Oral DAILY    calcium-vitamin D (OS-ARUN) 500 mg-200 unit tablet  1 Tab Oral DAILY    carvedilol (COREG) tablet 25 mg  25 mg Oral BID WITH MEALS    ferrous sulfate tablet 650 mg  650 mg Oral DAILY    hydrALAZINE (APRESOLINE) tablet 25 mg  25 mg Oral TID    levothyroxine (SYNTHROID) tablet 50 mcg  50 mcg Oral ACB    nitroglycerin (NITROSTAT) tablet 0.4 mg  0.4 mg SubLINGual PRN    pantoprazole (PROTONIX) tablet 40 mg  40 mg Oral DAILY    atorvastatin (LIPITOR) tablet 20 mg  20 mg Oral QHS    isosorbide mononitrate ER (IMDUR) tablet 120 mg  120 mg Oral DAILY        LAB AND IMAGING FINDINGS:     Lab Results   Component Value Date/Time    WBC 4.6 02/12/2018 02:28 AM    HGB 9.1 (L) 02/12/2018 02:28 AM    PLATELET 367 (L) 88/37/7263 02:28 AM     Lab Results   Component Value Date/Time    Sodium 144 02/12/2018 02:28 AM    Potassium 3.4 (L) 02/12/2018 02:28 AM    Chloride 101 02/12/2018 02:28 AM    CO2 38 (H) 02/12/2018 02:28 AM    BUN 31 (H) 02/12/2018 02:28 AM    Creatinine 1.92 (H) 02/12/2018 02:28 AM    Calcium 9.4 02/12/2018 02:28 AM    Magnesium 2.2 01/17/2017 02:32 AM    Phosphorus 3.8 11/09/2017 02:43 PM      Lab Results   Component Value Date/Time    AST (SGOT) 37 02/07/2018 04:49 PM    Alk.  phosphatase 20 (L) 02/07/2018 04:49 PM Protein, total 6.7 02/07/2018 04:49 PM    Albumin 3.5 02/07/2018 04:49 PM    Globulin 3.2 02/07/2018 04:49 PM     Lab Results   Component Value Date/Time    INR 1.3 (H) 02/07/2018 04:49 PM    Prothrombin time 15.5 (H) 02/07/2018 04:49 PM    aPTT 43.4 (H) 01/22/2012 02:26 AM      Lab Results   Component Value Date/Time    Iron 41 (L) 01/13/2017 04:12 PM    TIBC 276 01/13/2017 04:12 PM    Iron % saturation 15 01/13/2017 04:12 PM    Ferritin 529 (H) 01/13/2017 04:12 PM      No results found for: PH, PCO2, PO2  No components found for: [place]   Lab Results   Component Value Date/Time     02/08/2018 10:22 AM    CK - MB 3.7 (H) 02/08/2018 10:22 AM              Total time: 145   Counseling / coordination time, spent as noted above: 130  > 50% counseling / coordination: yes with family, provider, nurse,     Prolonged service was provided for  []30 min   [x]75 min in face to face time in the presence of the patient, spent as noted above. Time Start:  1110  Time End: 1335  Note: this can only be billed with  (initial) or 21 [phone] (follow up). If multiple start / stop times, list each separately.

## 2018-02-12 NOTE — PROGRESS NOTES
7:35 PM Beside shift change report given by Jacques Golden, RN  to LUKAS Alfaro RN. Report included the information from the [employer], [facility], I/O, and recent results. Denies pain or discomfort. Call light within reach. Bed in low position. 8:04 AM Beside shift change report given by LUKAS Alfaro RN to Camryn Jensen RN. Report included the information from the [employer], [facility], I/O, and recent results. Patient sitting on bedside commode c/o being at the bathroom and getting too out of breath to continue brushing teeth and wash face. Patient was transferred to room recliner and is currently with the NC at 2L/min. Instructed to call RN before getting out of bed for assistance to bed.

## 2018-02-12 NOTE — PROGRESS NOTES
Problem: Falls - Risk of  Goal: *Absence of Falls  Document Cam Fall Risk and appropriate interventions in the flowsheet.    Outcome: Progressing Towards Goal  Fall Risk Interventions:  Mobility Interventions: Bed/chair exit alarm, Patient to call before getting OOB    Mentation Interventions: Adequate sleep, hydration, pain control, Bed/chair exit alarm, Door open when patient unattended    Medication Interventions: Bed/chair exit alarm, Patient to call before getting OOB, Teach patient to arise slowly    Elimination Interventions: Call light in reach, Patient to call for help with toileting needs, Toilet paper/wipes in reach, Bed/chair exit alarm

## 2018-02-12 NOTE — DISCHARGE SUMMARY
Discharge Summary  [address] [facility] [employer] [facility] [employer]      Patient: Robert Nelson Age: 80 y.o. Sex: female  : 1928    MRN: [id number]      DOA: 2018      Discharge Date: 18      Devi Villa MD      PCP: Earl Monique MD        ================================================================    Reason for Admission:   Pleural effusion  CHF exacerbation [facility])    Discharge Diagnoses:   CHF exacerbation (resolved)  CAD (stable)  CKD stage 4 (stable)  HTN (stable)  Hypothyroidism (stable)  HLD/GERD/DM2 (stable)    Important notes to PCP/ follow-up studies and evaluations   1) Palliative care consulted inpatient. Patient has planned hospice consult outpatient  2) AICD was turned off per patient request. Patient does not want escalation of care (No ICU or pressors). Continue current care. Pending labs and studies:  None    Operative Procedures:   None    Discharge Medications:     Current Discharge Medication List      START taking these medications    Details   simethicone 125 mg chewable tablet Take 125 mg by mouth every eight (8) hours as needed for Flatulence. Qty: 20 Tab, Refills: 0         CONTINUE these medications which have NOT CHANGED    Details   hydrALAZINE (APRESOLINE) 25 mg tablet Take 25 mg by mouth three (3) times daily. atorvastatin (LIPITOR) 20 mg tablet Take 20 mg by mouth daily. isosorbide mononitrate ER (IMDUR) 120 mg CR tablet TAKE ONE TABLET BY MOUTH ONCE DAILY  Qty: 30 Tab, Refills: 11    Associated Diagnoses: Angina of effort (HCC)      Ca-D3-mag ox-zinc--romina-bor (CALCIUM 600-D3 PLUS) 600 mg calcium- 800 unit-50 mg tab Take 1 Each by mouth daily. carvedilol (COREG) 25 mg tablet Take 1 Tab by mouth two (2) times daily (with meals). Qty: 60 Tab, Refills: 6      omeprazole (PRILOSEC) 20 mg capsule Take 20 mg by mouth daily. ferrous sulfate (IRON) 325 mg (65 mg iron) tablet Take 650 mg by mouth daily.       flaxseed oil 1,000 mg cap Take  by mouth two (2) times a day. BUMETANIDE (BUMEX PO) Take 1 mg by mouth two (2) times a day. Associated Diagnoses: Systolic CHF, chronic (HCC)      potassium chloride (K-DUR) 20 mEq tablet Take 10 mEq by mouth two (2) times a day. CRANBERRY EXT/C/L. SPOROGENES (CRANBERRY-PROBIOTICS-VITAMIN C PO) Take 1,500 mg by mouth two (2) times a day. 3 tablets      aspirin 81 mg tablet Take 81 mg by mouth daily. levothyroxine (LEVOXYL) 50 mcg tablet Take 50 mcg by mouth daily (before breakfast). fluticasone (FLONASE) 50 mcg/actuation nasal spray 2 Sprays by Both Nostrils route as needed. nitroglycerin (NITROSTAT) 0.4 mg SL tablet 0.4 mg by SubLINGual route every five (5) minutes as needed. for chest pain             Disposition: Home with Home Health    Consultants:    Jennifer Post NP, Osei Meza MD, Cardiology    Brief Hospital Course (including pertinent history and physical findings)  80 y.o. female with PMH chronic systolic and diastolic CHF, CKD stage IV, HTN, hypothyroidism, anemia, OA, DM- diet controlled, CAD s/p AICD, GERD, HLD, neuropathy, and IBS, presented to [address] [place] [address] for worsening dyspnea and bilateral leg edema of several weeks. The patient was admitted for CHF exacerbation and was placed on IV lasix. She was switched back to her home med Bumex once stable and was discharged to home health after palliative consultation. During the hospital stay, the patient expressed her desires to not have any aggressive plan. Palliative care team consulted hospice team and the patient was approved for home hospice. CHF Exacerbation. HFrEF  The patient had BNP ~46898 on admission with negative troponin. Cardiology was consulted and she was placed on IV lasix en lieu of home med Bumex. She was continued on home O2 supplemental therapy and the patient was improving daily. By the time of her discharge, the swelling of her legs was much improved.  She remained hemodynamically stable during her stay. CAD/CKD stage 4/HTN/Hypothyroidism/HLD/DM2  Patient continued on her home medications    GERD  Patient complained of lot of eructation and increased flatus. She was given PRN Simethicone which helped. She was discharged home with PRN simethicone. She was continued on PPI and probiotics during her stay. Summarized key findings and results (labs, imaging studies, ECHO, cardiac cath, endoscopies, etc):  Cr: 1.87>1.85>2.04>2.10>1.99>1.92  Trop: 0.02>0.03>0.03  BNP: [id number]  EKG (2/8): Normal sinus rhythm, Left axis deviation, Nonspecific intraventricular block, Cannot rule out Septal infarct (cited on or before 07-FEB-2018), T wave abnormality, consider lateral ischemia  CXR: Cardiomegaly. Enlarging moderate sized right pleural effusion. Persistent left basilar consolidation similar to the prior study likely related atelectasis and pleural fluid. No convincing evidence of heart failure at this time. Functional status and cognitive function:    Ambulates with standard cane  Status: alert, cooperative, no distress, appears stated age    Diet:  Cardiac. 1L fluid restriction    Code status and advanced care plan: DNR  Power Of [facility] of Contact: Abby Beverly"Vaibhav (Granddaughter)  [phone]    Patient Education:  Patient was educated on the following topics prior to discharge:ACEi CHF    Follow-up:   Follow-up Information     Follow up With Details Comments Contact Info    Manda Landry MD On 2/14/2018 at 11am with [address] [place] [id number]  [phone]              ================================================================  Isha Rios  PGY-1 [facility]  02/12/18 4:46 PM

## 2018-02-12 NOTE — PALLIATIVE CARE
Full note to follow  Discussion with patient and family. Pt is DNR/DNI, do not intubate for respiratory failure. Continue current care, but no escalation of care (No ICU, no pressors). AICD to be deactivated. Patient is interested in home hospice, consult placed.

## 2018-02-12 NOTE — PROGRESS NOTES
and Palliative NP Jr Shrestha met with Ms Princess Pineda who welcomed our visit and stated that her son Rian Killian was on his way. She shared that she is feeling much better today.  offered listening support and compassion as Rian Killian arrived and connected with 3 of Ms Bradley's children by phone - Dixie Jorge and Twan Ortez. Ms Bradley's granddaughter \"Re\" called in also. Norma Brooks and Re are only two family members in town.)    Ms Princess Pineda is very clear in her wishes - \" I'm tired. I want to be at peace and I want to be comfortable. I am not afraid. \" She continued to describe how difficult it is for her when she is not able to breathe. \"I don't want anyone to have to go through that suffering. \" Ms Princess Pineda also stated that she wants to have the AICD turned off. Ms Princess Pineda is helping care for her spouse at home and shared that she needs help and support. Friends and family bring meals to her. Hospice support was introduced. Family added that Ms Princess Pineda needs 24 hour care and expressed understanding that Ms Princess Pineda applied for [place] 1-19-18, and received a letter on 1-24-18, stating that it may take up to 45 days for approval of application. (Ms Princess Pineda shared copies of this letter with us.)      Ms Sarmiento Drivers in her children and grandchildren and pulled out a couple of pictures to share with us. She and Rian Killian expressed gratitude for having good kids - \"no trouble. \"     POST form was explained and completed by NP and Ms Princess Pineda. POST indicates DNR, DNI, comfort measures and no feeding tube. All of Ms Margaret Castellanos choices are consistent with her clear goals of care. NP Jr Shrestha noted on the POST form that Ms Princess Pineda does want to come back to hospital if her symptoms are not able to be controlled if she becomes short of breath. Family all expressed their gratitude for support and information. Team will continue to follow up for support.

## 2018-02-13 NOTE — DISCHARGE INSTRUCTIONS
DISCHARGE SUMMARY from Nurse    PATIENT INSTRUCTIONS:    After general anesthesia or intravenous sedation, for 24 hours or while taking prescription Narcotics:  · Limit your activities  · Do not drive and operate hazardous machinery  · Do not make important personal or business decisions  · Do  not drink alcoholic beverages  · If you have not urinated within 8 hours after discharge, please contact your surgeon on call. Report the following to your surgeon:  · Excessive pain, swelling, redness or odor of or around the surgical area  · Temperature over 100.5  · Nausea and vomiting lasting longer than 4 hours or if unable to take medications  · Any signs of decreased circulation or nerve impairment to extremity: change in color, persistent  numbness, tingling, coldness or increase pain  · Any questions    What to do at Home:  Recommended activity: Activity as tolerated, use caution for falls      If you experience any of the following symptoms increased pain, shortness of breath, nausea or vomiting please follow up with PCP, home health nurse, . *  Please give a list of your current medications to your Primary Care Provider. *  Please update this list whenever your medications are discontinued, doses are      changed, or new medications (including over-the-counter products) are added. *  Please carry medication information at all times in case of emergency situations. These are general instructions for a healthy lifestyle:    No smoking/ No tobacco products/ Avoid exposure to second hand smoke  Surgeon General's Warning:  Quitting smoking now greatly reduces serious risk to your health.     Obesity, smoking, and sedentary lifestyle greatly increases your risk for illness    A healthy diet, regular physical exercise & weight monitoring are important for maintaining a healthy lifestyle    You may be retaining fluid if you have a history of heart failure or if you experience any of the following symptoms: Weight gain of 3 pounds or more overnight or 5 pounds in a week, increased swelling in our hands or feet or shortness of breath while lying flat in bed. Please call your doctor as soon as you notice any of these symptoms; do not wait until your next office visit. Recognize signs and symptoms of STROKE:    F-face looks uneven    A-arms unable to move or move unevenly    S-speech slurred or non-existent    T-time-call 911 as soon as signs and symptoms begin-DO NOT go       Back to bed or wait to see if you get better-TIME IS BRAIN. Warning Signs of HEART ATTACK     Call 911 if you have these symptoms:   Chest discomfort. Most heart attacks involve discomfort in the center of the chest that lasts more than a few minutes, or that goes away and comes back. It can feel like uncomfortable pressure, squeezing, fullness, or pain.  Discomfort in other areas of the upper body. Symptoms can include pain or discomfort in one or both arms, the back, neck, jaw, or stomach.  Shortness of breath with or without chest discomfort.  Other signs may include breaking out in a cold sweat, nausea, or lightheadedness. Don't wait more than five minutes to call 911 - MINUTES MATTER! Fast action can save your life. Calling 911 is almost always the fastest way to get lifesaving treatment. Emergency Medical Services staff can begin treatment when they arrive -- up to an hour sooner than if someone gets to the hospital by car. The discharge information has been reviewed with the patient and caregiver. The patient and caregiver verbalized understanding. Discharge medications reviewed with the patient and caregiver and appropriate educational materials and side effects teaching were provided. MedTel24 Activation    Thank you for requesting access to MedTel24. Please follow the instructions below to securely access and download your online medical record.  MedTel24 allows you to send messages to your doctor, view your test results, renew your prescriptions, schedule appointments, and more. How Do I Sign Up? 1. In your internet browser, go to [email or web address]. 2. Click on the First Time User? Click Here link in the Sign In box. You will see the New Member Sign Up page. 3. Enter your [facility] Access Code exactly as it appears below. You will not need to use this code after youve completed the sign-up process. If you do not sign up before the expiration date, you must request a new code. [facility] Access Code: [email or web address]  Expires: 2018  7:59 PM (This is the date your [facility] access code will )    4. Enter the last four digits of your Social Security Number (xxxx) and Date of Birth (mm/dd/yyyy) as indicated and click Submit. You will be taken to the next sign-up page. 5. Create a [facility] ID. This will be your [facility] login ID and cannot be changed, so think of one that is secure and easy to remember. 6. Create a [facility] password. You can change your password at any time. 7. Enter your Password Reset Question and Answer. This can be used at a later time if you forget your password. 8. Enter your e-mail address. You will receive e-mail notification when new information is available in [address] 9. Click Sign Up. You can now view and download portions of your medical record. 10. Click the Download Summary menu link to download a portable copy of your medical information. Additional Information    If you have questions, please visit the Frequently Asked Questions section of the [facility] website at [email or web address] Remember, [facility] is NOT to be used for urgent needs. For medical emergencies, dial 911. Patient armband removed and shredded         Learning About ACE Inhibitors for Heart Failure  Introduction    ACE (angiotensin-converting enzyme) inhibitors block an enzyme that makes blood vessels narrow. As a result, the blood vessels relax and widen.  This lowers blood pressure. These medicines also put more water and salt into the urine. This also lowers blood pressure. In heart failure, your heart does not pump as much blood as your body needs. These medicines:  · Make it easier for your heart to pump. · Help reduce symptoms. · Make a heart attack or stroke less likely. Examples  These medicines include:  · Benazepril (Lotensin). · Lisinopril (Prinivil, Zestril). · Ramipril (Altace). This is not a complete list.  Possible side effects  Side effects may include:  · Cough. · Low blood pressure. You may feel dizzy and weak. · High potassium levels. · An allergic reaction. You may have other side effects or reactions not listed here. Check the information that comes with your medicine. What to know about taking this medicine  · ACE inhibitors:  ¨ Are often used to treat heart failure. They may be the only medicine used if your only symptoms are feeling tired and mildly short of breath with no fluid buildup (edema). But in most other cases, they are used with other medicines. ¨ Can cause a dry cough. If the cough is bad, talk to your doctor. You may need to try a different medicine. ¨ Can relieve symptoms, improve how you feel, and make it less likely you will need to stay in a hospital. And they can reduce the risk of early death. ¨ Can cause an allergic reaction of the skin. Symptoms may range from mild swelling to painful welts. It is rare to have severe swelling that makes it hard to breathe. · Take your medicines exactly as prescribed. Call your doctor if you think you are having a problem with your medicine. · Check with your doctor or pharmacist before you use any other medicines. This includes over-the-counter medicines. Make sure your doctor knows all of the medicines, vitamins, herbal products, and supplements you take. Taking some medicines together can cause problems.   · You should not take an ACE inhibitor if you are pregnant or planning to become pregnant. · You may need regular blood tests. Where can you learn more? Go to http://cosme-urszula.info/. Enter V224 in the search box to learn more about \"Learning About ACE Inhibitors for Heart Failure. \"  Current as of: September 21, 2016  Content Version: 11.4  © 2786-5747 [employer]. Care instructions adapted under license by [employer] (which disclaims liability or warranty for this information). If you have questions about a medical condition or this instruction, always ask your healthcare professional. [address] any warranty or liability for your use of this information. Advance Directives: Care Instructions  Your Care Instructions  An advance directive is a legal way to state your wishes at the end of your life. It tells your family and your doctor what to do if you can no longer say what you want. There are two main types of advance directives. You can change them any time that your wishes change. · A living will tells your family and your doctor your wishes about life support and other treatment. · A durable power of  for health care lets you name a person to make treatment decisions for you when you can't speak for yourself. This person is called a health care agent. If you do not have an advance directive, decisions about your medical care may be made by a doctor or a  who doesn't know you. It may help to think of an advance directive as a gift to the people who care for you. If you have one, they won't have to make tough decisions by themselves. Follow-up care is a key part of your treatment and safety. Be sure to make and go to all appointments, and call your doctor if you are having problems. It's also a good idea to know your test results and keep a list of the medicines you take. How can you care for yourself at home? · Discuss your wishes with your loved ones and your doctor.  This way, there are no surprises. · Many states have a unique form. Or you might use a universal form that has been approved by many states. This kind of form can sometimes be completed and stored online. Your electronic copy will then be available wherever you have a connection to the Internet. In most cases, doctors will respect your wishes even if you have a form from a different state. · You don't need a  to do an advance directive. But you may want to get legal advice. · Think about these questions when you prepare an advance directive:  ¨ Who do you want to make decisions about your medical care if you are not able to? Many people choose a family member or close friend. ¨ Do you know enough about life support methods that might be used? If not, talk to your doctor so you understand. ¨ What are you most afraid of that might happen? You might be afraid of having pain, losing your independence, or being kept alive by machines. ¨ Where would you prefer to die? Choices include your home, a hospital, or a nursing home. ¨ Would you like to have information about hospice care to support you and your family? ¨ Do you want to donate organs when you die? ¨ Do you want certain [employer]an practices performed before you die? If so, put your wishes in the advance directive. · Read your advance directive every year, and make changes as needed. When should you call for help? Be sure to contact your doctor if you have any questions. Where can you learn more? Go to http://cosme-urszula.info/. Enter R264 in the search box to learn more about \"Advance Directives: Care Instructions. \"  Current as of: September 24, 2016  Content Version: 11.4  © 3846-4712 [employer]. Care instructions adapted under license by [employer] (which disclaims liability or warranty for this information).  If you have questions about a medical condition or this instruction, always ask your healthcare professional. [address] any warranty or liability for your use of this information.   ___________________________________________________________________________________________________________________________________

## 2018-02-13 NOTE — PROGRESS NOTES
Pt and granddaughter given discharge instructions and prescriptions. Expressed understanding. Discharged home . To exit per w/c by [facility] CNA.  No distress noted at time of transport

## 2018-02-13 NOTE — PROGRESS NOTES
Transition of care coordination/Hospitalization  Shahana Scott a 80 y.o. female was admitted to [facility] on 2/7/18 to 2/12/18 for Pleural effusio and  CHF. Brief Hospital course:  Patient presented to ED with worsening dyspnea and bilateral leg edema. Patient was started on IV lasix due to BNP >8k. She was continue on home oxygen supplement therapy. Troponin were negative. Patient has hx of CAD/CKD stage 4, HTN/hypothyroidism/HLD/DM2 and has an AICD. Patient was continue on her home medications for previous hx. Diagnoses. Once stable patient was switched back to home bumex. Patient expressed desire to not have any aggressive plan. Palliative care team consulted hospice team and patient was approved and place on hospice. Patient was discharged home with [facility]. Pertinent labs/imaging:  Summarized key findings and results (labs, imaging studies, ECHO, cardiac cath, endoscopies, etc):  Cr: 1.87>1.85>2.04>2.10>1.99>1.92  Trop: 0.02>0.03>0.03  BNP: [id number]  EKG (2/8): Normal sinus rhythm, Left axis deviation, Nonspecific intraventricular block, Cannot rule out Septal infarct (cited on or before 07-FEB-2018), T wave abnormality, consider lateral ischemia  CXR: Cardiomegaly. Enlarging moderate sized right pleural effusion. Persistent left basilar consolidation similar to the prior study likely related atelectasis and pleural fluid. No convincing evidence of heart failure at this time. Inpatient Consults:  Junaid Miller DO   Cardiology     Rosa Almanzar NP   Palliative Medicine       Discharge Diagnoses:   CHF exacerbation (resolved)  CAD (stable)  CKD stage 4 (stable)  HTN (stable)  Hypothyroidism (stable)  HLD/GERD/DM2 (stable)      RRAT Score: [place] 25 utilization in past 12 months: 2  ED utilization in past 12 months: 0    Contacted patient for transition of care s/p hospitalization. Introduced self, role and reason for call. Verified 2 patient identifiers.  Patient stated, \" the lady is here now. \"      NN spoke with Isabela Mojica, clinical  with [facility] Verified 2 patient identifiers. Introduced self, role and reason for call. Isabela Mojica stated, \" there seem to be an cognitive issue with her and her spouse. \"  Writer reported that patient has a Son, Elena Castañeda. Isabela Mojica reported that she was working on contacting him now. NN asked Isabela Mojica ( SW with [facility] ) to give NN a call before she leaves patient's home to update NN on hsopice status. Isabela Mojica given NN contact number. Resources/Support:   [facility]    AMD:   On file    Patient on hospice care with [facility] hosp[facility]. Goals/Plan of Care:   To remain comfortable in home with hospice care     Appointments:  ? 2/14/18 with Dr. Alessia Treadwell @ 11:00 am

## 2018-02-14 NOTE — PROGRESS NOTES
[facility] [address] to speak with Brandyn Muñiz SW, re: update on patient. Spoke with . Verified 2 patient identifiers. Introduced self, role and reason for call. Writer was told that Kavin Bellamy will be given message to contact writer at contact number given. Contacted patient. Verified 2 patient identifiers. Introduced self, role and reason for call. Patient stated, \" I am coming along pretty good, never up to par but better. \"      Patient reports:  SOB with minimal exertion    [facility] MD, Nurse and SW in home on yesterday and her Son and granddaughter was present during visit. Hospital bed and gel mattress delivered to home    [facility] reviewed medications and took her off 2 supplements ( B12 and can't remember the other one.)     Being forgetful    Being satisfied with the choice of being on hospice      Patient denies:      Edema    Wheezing    Chest Pain    Fever/chills     N/V     Appointments:   No appointments due to patient is with hospice. Plan:  NN will end episode. Patient Has [facility] at this time who will follow patient. Saray NAJERA with [facility] returned call to writer. Verified 2 patient identifiers. Kavin Bellamy reported that she called in patient's son and granddaughter on yesterday due to feeling it was to much information for the patient. Kavin Bellamy also confirmed patient is now with [facility].

## 2018-02-15 NOTE — TELEPHONE ENCOUNTER
Cardiac Rehab screening complete.  Due to patient being put on Home Hospice care, she is not a candidate for the program.     Thank you,  Chhaya Maldonado